# Patient Record
Sex: MALE | Race: WHITE | NOT HISPANIC OR LATINO | Employment: FULL TIME | URBAN - METROPOLITAN AREA
[De-identification: names, ages, dates, MRNs, and addresses within clinical notes are randomized per-mention and may not be internally consistent; named-entity substitution may affect disease eponyms.]

---

## 2020-02-03 ENCOUNTER — TRANSFERRED RECORDS (OUTPATIENT)
Dept: HEALTH INFORMATION MANAGEMENT | Facility: CLINIC | Age: 45
End: 2020-02-03

## 2020-02-03 LAB
ALT SERPL-CCNC: 70 IU/L (ref 12–68)
AST SERPL-CCNC: 36 IU/L (ref 12–37)
CREAT SERPL-MCNC: 0.84 MG/DL (ref 0.7–1.3)
GFR SERPL CREATININE-BSD FRML MDRD: >60 ML/MIN
GLUCOSE SERPL-MCNC: 120 MG/DL (ref 74–106)
HEP C HIM: NORMAL
POTASSIUM SERPL-SCNC: 3.9 MMOL/L (ref 3.5–5.1)

## 2020-02-06 ENCOUNTER — TRANSFERRED RECORDS (OUTPATIENT)
Dept: HEALTH INFORMATION MANAGEMENT | Facility: CLINIC | Age: 45
End: 2020-02-06

## 2020-02-06 LAB
ALT SERPL-CCNC: 135 U/L (ref 0–78)
AST SERPL-CCNC: 84 U/L (ref 0–37)
CREAT SERPL-MCNC: 0.9 MG/DL (ref 0.7–1.3)
GLUCOSE SERPL-MCNC: 82 MG/DL (ref 74–100)
HEP C HIM: NORMAL
POTASSIUM SERPL-SCNC: 4.5 MMOL/L (ref 3.5–5.1)

## 2020-02-11 ENCOUNTER — TRANSFERRED RECORDS (OUTPATIENT)
Dept: HEALTH INFORMATION MANAGEMENT | Facility: CLINIC | Age: 45
End: 2020-02-11

## 2020-02-21 LAB — INR PPP: 0.9 (ref 0.9–1.1)

## 2020-05-15 LAB — TSH SERPL-ACNC: 2.42 UIU/ML (ref 0.45–4.5)

## 2020-08-14 LAB
ALT SERPL-CCNC: 74 U/L (ref 0–78)
AST SERPL-CCNC: 49 U/L (ref 0–37)
CREAT SERPL-MCNC: 0.9 MG/DL (ref 0.7–1.3)
GLUCOSE SERPL-MCNC: 94 MG/DL (ref 74–100)
POTASSIUM SERPL-SCNC: 4.4 MMOL/L (ref 3.5–5.1)

## 2020-09-18 ENCOUNTER — TRANSFERRED RECORDS (OUTPATIENT)
Dept: HEALTH INFORMATION MANAGEMENT | Facility: CLINIC | Age: 45
End: 2020-09-18

## 2020-10-02 ENCOUNTER — TRANSFERRED RECORDS (OUTPATIENT)
Dept: HEALTH INFORMATION MANAGEMENT | Facility: CLINIC | Age: 45
End: 2020-10-02

## 2020-12-02 ENCOUNTER — TRANSFERRED RECORDS (OUTPATIENT)
Dept: HEALTH INFORMATION MANAGEMENT | Facility: CLINIC | Age: 45
End: 2020-12-02

## 2020-12-17 ENCOUNTER — TRANSFERRED RECORDS (OUTPATIENT)
Dept: HEALTH INFORMATION MANAGEMENT | Facility: CLINIC | Age: 45
End: 2020-12-17

## 2021-04-07 ENCOUNTER — VIRTUAL VISIT (OUTPATIENT)
Dept: GASTROENTEROLOGY | Facility: CLINIC | Age: 46
End: 2021-04-07
Payer: COMMERCIAL

## 2021-04-07 DIAGNOSIS — K59.00 CONSTIPATION, UNSPECIFIED CONSTIPATION TYPE: ICD-10-CM

## 2021-04-07 DIAGNOSIS — K50.80 CROHN'S DISEASE OF BOTH SMALL AND LARGE INTESTINE WITHOUT COMPLICATION (H): Primary | ICD-10-CM

## 2021-04-07 PROCEDURE — 99205 OFFICE O/P NEW HI 60 MIN: CPT | Mod: 95 | Performed by: INTERNAL MEDICINE

## 2021-04-07 RX ORDER — ACETAMINOPHEN 500 MG
500 TABLET ORAL
COMMUNITY

## 2021-04-07 RX ORDER — FEXOFENADINE HCL 180 MG/1
180 TABLET ORAL
COMMUNITY

## 2021-04-07 RX ORDER — LANOLIN ALCOHOL/MO/W.PET/CERES
1000 CREAM (GRAM) TOPICAL DAILY
COMMUNITY
Start: 2020-12-22

## 2021-04-07 RX ORDER — ONDANSETRON 4 MG/1
4 TABLET, ORALLY DISINTEGRATING ORAL EVERY 6 HOURS PRN
COMMUNITY
Start: 2019-05-15

## 2021-04-07 RX ORDER — LINACLOTIDE 290 UG/1
CAPSULE, GELATIN COATED ORAL
COMMUNITY
Start: 2021-03-11 | End: 2023-05-24

## 2021-04-07 RX ORDER — BUTALBITAL/ACETAMINOPHEN 50MG-325MG
TABLET ORAL
COMMUNITY
Start: 2021-02-15 | End: 2024-06-11

## 2021-04-07 RX ORDER — ELETRIPTAN HYDROBROMIDE 20 MG/1
20 TABLET, FILM COATED ORAL
COMMUNITY
Start: 2019-05-15 | End: 2024-06-11 | Stop reason: DRUGHIGH

## 2021-04-07 RX ORDER — TADALAFIL 20 MG/1
20 TABLET ORAL
COMMUNITY
Start: 2019-05-15

## 2021-04-07 RX ORDER — POLYETHYLENE GLYCOL 3350 17 G/17G
17 POWDER, FOR SOLUTION ORAL
COMMUNITY
Start: 2020-05-31 | End: 2023-12-27

## 2021-04-07 RX ORDER — ADALIMUMAB 40MG/0.4ML
40 KIT SUBCUTANEOUS
Qty: 4 EACH | Refills: 3 | Status: SHIPPED | OUTPATIENT
Start: 2021-04-07 | End: 2021-08-06

## 2021-04-07 RX ORDER — AZELASTINE HYDROCHLORIDE, FLUTICASONE PROPIONATE 137; 50 UG/1; UG/1
1 SPRAY, METERED NASAL
COMMUNITY
End: 2024-06-11

## 2021-04-07 RX ORDER — HYOSCYAMINE SULFATE 0.38 MG/1
0.38 TABLET, EXTENDED RELEASE ORAL EVERY 12 HOURS PRN
COMMUNITY
Start: 2019-05-15

## 2021-04-07 RX ORDER — ADALIMUMAB 40MG/0.4ML
40 KIT SUBCUTANEOUS
COMMUNITY
Start: 2019-04-25 | End: 2021-04-07

## 2021-04-07 RX ORDER — FLUTICASONE PROPIONATE 50 MCG
1 SPRAY, SUSPENSION (ML) NASAL
COMMUNITY
Start: 2019-05-15

## 2021-04-07 NOTE — PROGRESS NOTES
HPI:    Monty  presents today for a video visit to establish care for Crohn's disease.  Was initially diagnosed in 2018 - was started on humira which was eventually increased to weekly doses with good response.  Did at one point have a stricture in the transverse colon that was unable to be crossed, however, this had resolved on follow-up colonoscopy.  Recent MRE and colonoscopy without active inflammation.     Continues to struggle with constipation.  Has been on trulance and miralax which didn't seem to help.  Is now on linzess 290mcg daily - will have multiple loose stools in the AM followed by more regular stools later in the day.  Also has generalized abdominal pain.  Has tried levsin but didn't like how it made him feel.    Has been gluten free for a few years.  Was raised and remains a vegetarian.  Doesn't eat dairy - does eat some eggs.     Also feels very run down and like he has no energy all the time.  Doesn't think he has anxiety or depression but does admit to feeling frustrated that his symptoms aren't resolving as fast as he would like.    Never smoker.  No alcohol.    Last c-scope with large sigmoid polyp.    History of a nissen - had to have a redo shortly after as it caused dysphagia.     Used to follow with a dermatologist due to history of dysplastic nevi - hasn't established care with one since moving to [place]    PMH:  Crohns      Social Hx: no alcohol or tobacco.  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  25mm sigmoid polyp     MRE 10/2020 - normal aside from fatty  liver and constipation      Assessment and Plan:    # constipation - likely some component of IBS-C.  Now concern for over treatment with multiple loose stools a day - will decrease linzess to 145mcg daily and monitor response - patient encouraged to call with worsening symptoms.  Noted previous stricture in transverse colon on colonoscopy but had resolved on follow-up exam.    Crohn's colitis - doing well on weekly humira - minimal inflammation in cecum at last c-scope but otherwise unremarkable as was an MRE in October.  Will continue weekly humira. Will check blood work and fecal calpro today    Vaccinations:  -- Influenza (every year): Last given will address at next visit  -- TdaP (every 10 years): Last given will address at next visit  -- Pneumococcal Pneumonia (once then every 5 years): Last given will address at next visit  -- Yearly assessment for latent Tb (verbal screening and exam, PPD or QuantiFERON-Tb testing): Last obtained 2018 negative    One time confirmation of immunity or serologies:  -- Hepatitis A (serologies or immunizations): will discuss vaccine at next visit  -- Hepatitis B (serologies or immunizations): negative in 2018 - will discuss vaccine at next visit  -- MMR: will address at next visit  -- HPV (all aged 18-26): n/a  -- Meningococcal meningitis (all patients at risk for meningitis): N/a  -- Due to the immunosuppression in this patient, I would not advise administration of live vaccines such as varicella/VZV, intranasal influenza, MMR, or yellow fever vaccine (if travelling).      Bone mineral density screening   -- Recommend all patients supplement with calcium and vitamin D  -- Given prior steroid use recommend DEXA if not already done    Cancer Screening:  Colon cancer screening:    Skin cancer screening: Annual visual exam of skin by dermatologist since patient is immunocompromised - discussed with patient today given his history - referral placed to dermatology    Depression  Screening:  -- Discussed today - did talk about seeing health psychology - may consider in the future    Misc:  -- Avoid tobacco use  -- Avoid NSAIDs as there is potentially a 25% chance of causing an IBD flare      RTC 2 months    Ruby Beck DO     Video-Visit Details     Type of service:  Video Visit     Video Start Time: 10:33  Video End Time (time video stopped): 11:03    Originating Location (pt. Location): home     Distant Location (provider location):  [facility]      Mode of Communication:  Video Conference via [employer]

## 2021-04-07 NOTE — PATIENT INSTRUCTIONS
Please have blood work done and submit a stool sample whenever is convenient for you.    We will decrease your linzess to 145mcg a day - I have sent a new prescription to your pharmacy.  Try this for 1-2 weeks - let me know if your symptoms are getting worse or not improving and we can try something different.      The crohn's and colitis foundation (ccfa.org) is a great resource for patients - it has a lot of information regarding various treatment options, diet, community resources, etc.    Please see a dermatologist.     Currently, there is limited data to make recommendations regarding the COVID-19 vaccine. Unfortunately, people with conditions that cause low-immunity and people on medications that suppress their immune system were not included in the currently published clinical trials ([place] Journal of Medicine, December 2020). We are therefore using the best information we have to make the safest decision. We understand there are also individual circumstances that may not fall into a single recommendation.     - The COVID-19 vaccine in the [place] is NOT a live virus vaccine  - There is no risk of COVID infection with this vaccine  - There may be unknown risks as this is a new type of vaccine (no other current vaccines are like this)  - There may still be immune related side effects as this vaccine does stimulate the immune system (for example: fevers, sore throat, runny nose, pain at injection site)  - The vaccine may be less effective in people with IBD, especially if on medications that affect the immune system. On the other hand, this vaccine may be better than other vaccines (such as flu) because it can elicit a stronger immune response.     New immune reactions are a potential risk for all people, including people with inflammatory bowel disease. In the short term (2-3 months) this has not been seen in the general population.  It is important to know this is a potential risk, and not yet an observed  risk.     At this point, given the overall risk of COVID and likely safety of the COVID vaccine, patients with inflammatory bowel disease should get vaccinated when available. We also think people on medications that affect the immune system should get vaccinated. If you are concerned, then please (1) talk to your IBD provider and (2) make sure that everyone in your household does get the vaccine. Vaccinating the people around you will also help to protect you.     Finally, even after getting the vaccine it is still essential to wear a mask, wash your hands frequently, and practice social distancing. We will need to maintain these basic safety precautions until all people are vaccinated.     [facility]

## 2021-04-07 NOTE — PROGRESS NOTES
Monty is a 45 year old who is being evaluated via a billable video visit.      How would you like to obtain your AVS? MyChart  If the video visit is dropped, the invitation should be resent by: Text to cell phone: [phone]  Will anyone else be joining your video visit? No      Video Start Time:   Video-Visit Details    Type of service:  Video Visit    Video End Time:    Originating Location (pt. Location):     Distant Location (provider location):  [facility]     Platform used for Video Visit:   Marshal Poole CMA

## 2021-04-22 DIAGNOSIS — K59.00 CONSTIPATION, UNSPECIFIED CONSTIPATION TYPE: ICD-10-CM

## 2021-04-22 DIAGNOSIS — K50.80 CROHN'S DISEASE OF BOTH SMALL AND LARGE INTESTINE WITHOUT COMPLICATION (H): ICD-10-CM

## 2021-04-22 LAB
ALBUMIN SERPL-MCNC: 4.1 G/DL (ref 3.4–5)
ALP SERPL-CCNC: 102 U/L (ref 40–150)
ALT SERPL W P-5'-P-CCNC: 76 U/L (ref 0–70)
ANION GAP SERPL CALCULATED.3IONS-SCNC: 2 MMOL/L (ref 3–14)
AST SERPL W P-5'-P-CCNC: 40 U/L (ref 0–45)
BILIRUB SERPL-MCNC: 0.6 MG/DL (ref 0.2–1.3)
BUN SERPL-MCNC: 8 MG/DL (ref 7–30)
CALCIUM SERPL-MCNC: 8.9 MG/DL (ref 8.5–10.1)
CHLORIDE SERPL-SCNC: 108 MMOL/L (ref 94–109)
CO2 SERPL-SCNC: 30 MMOL/L (ref 20–32)
CREAT SERPL-MCNC: 0.79 MG/DL (ref 0.66–1.25)
CRP SERPL-MCNC: <2.9 MG/L (ref 0–8)
DEPRECATED CALCIDIOL+CALCIFEROL SERPL-MC: 41 UG/L (ref 20–75)
ERYTHROCYTE [DISTWIDTH] IN BLOOD BY AUTOMATED COUNT: 13.1 % (ref 10–15)
GFR SERPL CREATININE-BSD FRML MDRD: >90 ML/MIN/{1.73_M2}
GLUCOSE SERPL-MCNC: 75 MG/DL (ref 70–99)
HCT VFR BLD AUTO: 47.3 % (ref 40–53)
HGB BLD-MCNC: 15.8 G/DL (ref 13.3–17.7)
MCH RBC QN AUTO: 31.2 PG (ref 26.5–33)
MCHC RBC AUTO-ENTMCNC: 33.4 G/DL (ref 31.5–36.5)
MCV RBC AUTO: 93 FL (ref 78–100)
PLATELET # BLD AUTO: 265 10E9/L (ref 150–450)
POTASSIUM SERPL-SCNC: 4.1 MMOL/L (ref 3.4–5.3)
PROT SERPL-MCNC: 7.6 G/DL (ref 6.8–8.8)
RBC # BLD AUTO: 5.07 10E12/L (ref 4.4–5.9)
SODIUM SERPL-SCNC: 140 MMOL/L (ref 133–144)
TSH SERPL DL<=0.005 MIU/L-ACNC: 1.91 MU/L (ref 0.4–4)
VIT B12 SERPL-MCNC: 593 PG/ML (ref 193–986)
WBC # BLD AUTO: 7.3 10E9/L (ref 4–11)

## 2021-04-22 PROCEDURE — 36415 COLL VENOUS BLD VENIPUNCTURE: CPT | Performed by: INTERNAL MEDICINE

## 2021-04-22 PROCEDURE — 85027 COMPLETE CBC AUTOMATED: CPT | Performed by: INTERNAL MEDICINE

## 2021-04-22 PROCEDURE — 82607 VITAMIN B-12: CPT | Performed by: INTERNAL MEDICINE

## 2021-04-22 PROCEDURE — 86140 C-REACTIVE PROTEIN: CPT | Performed by: INTERNAL MEDICINE

## 2021-04-22 PROCEDURE — 82306 VITAMIN D 25 HYDROXY: CPT | Performed by: INTERNAL MEDICINE

## 2021-04-22 PROCEDURE — 80053 COMPREHEN METABOLIC PANEL: CPT | Performed by: INTERNAL MEDICINE

## 2021-04-22 PROCEDURE — 84443 ASSAY THYROID STIM HORMONE: CPT | Performed by: INTERNAL MEDICINE

## 2021-05-01 ENCOUNTER — HEALTH MAINTENANCE LETTER (OUTPATIENT)
Age: 46
End: 2021-05-01

## 2021-05-12 ENCOUNTER — OFFICE VISIT (OUTPATIENT)
Dept: DERMATOLOGY | Facility: CLINIC | Age: 46
End: 2021-05-12
Attending: INTERNAL MEDICINE
Payer: COMMERCIAL

## 2021-05-12 DIAGNOSIS — L81.4 SOLAR LENTIGO: ICD-10-CM

## 2021-05-12 DIAGNOSIS — K50.80 CROHN'S DISEASE OF BOTH SMALL AND LARGE INTESTINE WITHOUT COMPLICATION (H): ICD-10-CM

## 2021-05-12 DIAGNOSIS — Z87.898 HISTORY OF ATYPICAL NEVUS: ICD-10-CM

## 2021-05-12 DIAGNOSIS — D18.01 CHERRY ANGIOMA: ICD-10-CM

## 2021-05-12 DIAGNOSIS — D84.9 IMMUNOSUPPRESSED STATUS (H): Primary | ICD-10-CM

## 2021-05-12 DIAGNOSIS — L82.1 SEBORRHEIC KERATOSIS: ICD-10-CM

## 2021-05-12 DIAGNOSIS — L70.0 COMEDONE: ICD-10-CM

## 2021-05-12 DIAGNOSIS — D22.9 MULTIPLE BENIGN NEVI: ICD-10-CM

## 2021-05-12 PROCEDURE — 99203 OFFICE O/P NEW LOW 30 MIN: CPT | Performed by: PHYSICIAN ASSISTANT

## 2021-05-12 NOTE — NURSING NOTE
Monty Fleming's goals for this visit include:   Chief Complaint   Patient presents with     Skin Check     FBSC/ spot on L cheek area of concern/ no hx of skin cancers- hx of DN/ has seen Derm in [place] and [place]       He requests these members of his care team be copied on today's visit information:     PCP: No Ref-Primary, Physician    Referring Provider:  Ruby Beck DO  [address] [place] [address]    There were no vitals taken for this visit.    Do you need any medication refills at today's visit? No  Alis López, CMA on 5/12/2021 at 10:13 AM

## 2021-05-12 NOTE — PATIENT INSTRUCTIONS
The ABCDEs of Melanoma    Skin cancer can develop anywhere on the skin. Ask someone for help when checking your skin, especially in hard to see places. If you notice a mole different from others, or that changes, enlarges, itches, or bleeds (even if it is small), you should see a dermatologist.              Sun protective clothing and Resources     SureDone (www.[employer])  Athleta (www.Hastify)  eFolder (www.SpectraScience)  Carve Designs (Viddsee) - affordable  Skinz (Trendlines Groupskinz.com)    Long sleeve - Chauncey Cool DRI UPF 50 or [place] PFG UPF 50  Hoodie - [place] PFG UPF 50  Swimshirt/Rash Guard - [place] UPF 50 (on Amazon)  Neck - Outdoor Research Ubertubes (www.outdoorresAsl Analytical.com)

## 2021-05-12 NOTE — PROGRESS NOTES
[facility] Dermatology Note  Encounter Date: May 12, 2021  Office Visit     Dermatology Problem List:  1. Chron's Disease - Humira since 2018  2. History of DN x2 - patient reported, records requested  - left calf, ~2017  - second DN ~ 2010    Family history: Negative for skin cancer.  Social history: Lived in [place] and [place]. Moved to [place] for job.  ____________________________________________    Assessment & Plan:    # Immunosuppressed with Humira for Chron's Disease. Discussed increased risk of skin cancers with immunosuppressing medications.   - Recommend sunscreens SPF #30 or greater, protective clothing and avoidance of tanning beds.   - Recommended yearly skin exams.    # Cherry angioma(s).    - No further intervention needed.    # Multiple clinically benign nevi on the trunk.    - No further intervention needed.   - ABCD's of melanoma were reviewed with patient and handout provided.   - Sunscreen: Apply 20 minutes prior to going outdoors and reapply every two hours, when wet or sweating. We recommend using an SPF 30 or higher, and to use one that is water resistant.       # Seborrheic keratosis, non irritated.   - No further intervention needed.     # Solar lentigines.   - No further intervention needed.     # Comedone x1 - R clavicle  - No further intervention needed.    Procedures Performed:   None.    Follow-up: 1 year(s) in-person, or earlier for new or changing lesions    Staff and Scribe:     Scribe Disclosure:   I, Brianna Leung, am serving as a scribe to document services personally performed by Lorena Arias PA-C, based on data collection and the provider's statements to me.    Provider Disclosure:   The documentation recorded by the scribe accurately reflects the services I personally performed and the decisions made by me.    All risks, benefits and alternatives were discussed with patient.  Patient is in agreement and understands the assessment and plan.  All questions  were answered.    Lorena Arias PA-C, MPAS  [facility] [place]: Phone: [phone], Fax: [phone]  [facility]: Phone: [phone],  Fax: [phone]  ____________________________________________    CC: Skin Check (FBSC/ spot on L cheek area of concern/ no hx of skin cancers- hx of DN/ has seen Derm in [place] and [place])    HPI:  Mr. Monty Fleming is a(n) 45 year old male who presents today as a new patient for skin check.    Self referred. He has seen dermatologists in [place] and [place] in the past.    Today, patient notes a history of DN. Patient notes concern on the left cheek area. A dark spot has been more prevalent recently. Was diagnosed with Chron's disease at the end of 2018. Has been on Humira for one year.    Patient is otherwise feeling well, without additional concerns.    Labs:  NA    Physical Exam:  Vitals: There were no vitals taken for this visit.  SKIN: Total skin excluding the undergarment areas was performed. The exam included the head/face, neck, both arms, chest, back, abdomen, buttocks, both legs, digits and/or nails.   - Quezada Type II  - There are dome shaped bright red papules on the trunk.   - Multiple regular brown pigmented macules and papules are identified on the trunk.   - Scattered brown macules on the shoulders.  - There are waxy stuck on tan to brown papules on the right cheek (area of patient's concern).  - There is a small, 2mm open comedone on the right clavicle.  - No other lesions of concern on areas examined.     Medications:  Current Outpatient Medications   Medication     acetaminophen (TYLENOL) 500 MG tablet     adalimumab (HUMIRA *CF* PEN) 40 MG/0.4ML pen kit     azelastine-fluticasone (DYMISTA) 137-50 MCG/ACT nasal spray     Butalbital-Acetaminophen (PHRENILIN)  MG TABS per tablet     cholecalciferol 25 MCG (1000 UT) TABS     cyanocobalamin (VITAMIN B-12) 1000 MCG tablet      eletriptan (RELPAX) 20 MG tablet     fexofenadine (ALLEGRA) 180 MG tablet     fluticasone (FLONASE) 50 MCG/ACT nasal spray     hyoscyamine ER (LEVBID) 375 mcg 12 hr tablet     linaclotide (LINZESS) 145 MCG capsule     LINZESS 290 MCG capsule     ondansetron (ZOFRAN-ODT) 4 MG ODT tab     Peppermint Oil (IBGARD PO)     polyethylene glycol (MIRALAX) 17 GM/Dose powder     tadalafil (CIALIS) 20 MG tablet     No current facility-administered medications for this visit.       Past Medical History:   There is no problem list on file for this patient.    No past medical history on file.     CC [place]  [address] [place] [address] on close of this encounter.    CC Dr. Fu on close of this encounter.

## 2021-07-28 ENCOUNTER — VIRTUAL VISIT (OUTPATIENT)
Dept: GASTROENTEROLOGY | Facility: CLINIC | Age: 46
End: 2021-07-28
Payer: COMMERCIAL

## 2021-07-28 DIAGNOSIS — K59.00 CONSTIPATION, UNSPECIFIED CONSTIPATION TYPE: ICD-10-CM

## 2021-07-28 DIAGNOSIS — K50.80 CROHN'S DISEASE OF BOTH SMALL AND LARGE INTESTINE WITHOUT COMPLICATION (H): Primary | ICD-10-CM

## 2021-07-28 PROCEDURE — 99214 OFFICE O/P EST MOD 30 MIN: CPT | Mod: 95 | Performed by: INTERNAL MEDICINE

## 2021-07-28 NOTE — PROGRESS NOTES
Monty is a 45 year old who is being evaluated via a billable video visit.      How would you like to obtain your AVS? MyChart  If the video visit is dropped, the invitation should be resent by: Text to cell phone: [phone]  Will anyone else be joining your video visit? No    Prachi Sterling LPN on 7/28/21 at 1:50 PM    Video-Visit Details

## 2021-07-28 NOTE — PATIENT INSTRUCTIONS
I will start you on a lower dose of linzess - if this doesn't help or is still giving you diarrhea after about 2 weeks please let me know.    Please have blood work done.    Please schedule an MR enterography    You can try things like hotpacks, cold packs and topical creams like bengay, biofreeze, etc on your left side to see if it helps with pain.    You can try slowly adding gluten back to your diet and see how you feel.    I will have Wang Vo one of our health psychologists reach out to you.

## 2021-07-28 NOTE — PROGRESS NOTES
HPI:    Monty  presents today for a video visit for follow-up of crohns and constipation.  Overall doing okay.  Was started on the lower dose of linzess (145mcg) at last appointment - continues to struggle with intermittent diarrhea with this - skips doses on days he works outside of the house - this is usually only once or twice a week now but he expects this will be more frequent in the coming months as school starts up again and he is travelling more for meetings.    Continues to have occasional left sided abdominal pain.  Worse when seated and with laying down.  Sometimes worse with eating.  Not usually associated with bowel movements.    Continues to feel as though he has no energy.  Has motivation to do things but getting the energy to complete them is difficult.  Does use a c-pap - hasn't seen the sleep clinic in awhile though.  Falls asleep easily at night and stays asleep - doesn't feel rested in the AM.    Is vegetarian and generally avoids dairy as well.  Is gluten free which he switched to when he initially developed GI symptoms but before he was diagnosed with Crohns.      O:    Gen: no acute distress  HEENT: NCAT  Neck: normal ROM  Resp: nonlabored breathing  Neuro: no gross deficits  Psych: appropriate mood and affect    Assessment and Plan:   # crohns colitis/ileitis - doing well clinically on weekly humira - due for repeat labs - will order today.  Will also plan for MRE for disease surveillance and to further work-up left sided abdominal pain    # constipation - still with diarrhea with lower dose of linzess - will decrease to 72mcg daily and monitor response.    RTC 3 months    Vaccinations:  -- Influenza (every year): encourage to receive when available  -- TdaP (every 10 years): received last year  -- Pneumococcal Pneumonia (once then every 5 years): Last given will address at next visit - received prevnar in 2019 - will discuss pneumovax  -- Yearly assessment for latent Tb (verbal  screening and exam, PPD or QuantiFERON-Tb testing): Last obtained 2018 negative  - Covid vaccine - received 2021     One time confirmation of immunity or serologies:  -- Hepatitis A (serologies or immunizations): will discuss vaccine at next visit  -- Hepatitis B (serologies or immunizations): negative in 2018 - will discuss vaccine at next visit  -- MMR: will address at next visit  -- HPV (all aged 18-26): n/a  -- Meningococcal meningitis (all patients at risk for meningitis): N/a  -- Due to the immunosuppression in this patient, I would not advise administration of live vaccines such as varicella/VZV, intranasal influenza, MMR, or yellow fever vaccine (if travelling).       Bone mineral density screening   -- Recommend all patients supplement with calcium and vitamin D  -- Given prior steroid use recommend DEXA if not already done     Cancer Screening:  Colon cancer screening:     Skin cancer screening: Annual visual exam of skin by dermatologist since patient is immunocompromised - discussed with patient today given his history - referral placed to dermatology     Depression Screening:  -- Discussed again today - patient would like to speak with health psychologist - referral placed today     Misc:  -- Avoid tobacco use  -- Avoid NSAIDs as there is potentially a 25% chance of causing an IBD flare       Ruby Beck, DO     Video-Visit Details     Type of service:  Video Visit     Video Start Time: 2:03 PM  Video End Time (time video stopped): 2:24 PM    Originating Location (pt. Location): home     Distant Location (provider location):  [facility]      Mode of Communication:  Video Conference via [employer]

## 2021-07-29 ENCOUNTER — TELEPHONE (OUTPATIENT)
Dept: PSYCHOLOGY | Facility: CLINIC | Age: 46
End: 2021-07-29

## 2021-07-29 NOTE — TELEPHONE ENCOUNTER
Attempted to contact patient at the request of Dr. Beck to introduce [facility] services to the patient. Patient may schedule with the [facility] if he calls and requests an appointment. Will make a few more attempts to contact patient.    Update: Spoke with patient and scheduled new patient appointment for August 11.

## 2021-08-05 DIAGNOSIS — K50.80 CROHN'S DISEASE OF BOTH SMALL AND LARGE INTESTINE WITHOUT COMPLICATION (H): ICD-10-CM

## 2021-08-05 NOTE — TELEPHONE ENCOUNTER
[facility] Call Center    Phone Message    May a detailed message be left on voicemail: no     Reason for Call: Medication Refill Request    Has the patient contacted the pharmacy for the refill? Yes   Name of medication being requested: adalimumab (HUMIRA *CF* PEN) 40 MG/0.4ML pen kit  Provider who prescribed the medication: Ebony  Pharmacy:    [address]  Date medication is needed: ASAP - the patient is out of medication, and needs [facility] per [facility]. Please advise. Thank you.     Action Taken: Message routed to:  Adult Clinics: Gastroenterology (GI) p [id number]    Travel Screening: Not Applicable

## 2021-08-05 NOTE — TELEPHONE ENCOUNTER
adalimumab (HUMIRA *CF* PEN) 40 MG/0.4ML pen kit  Last Written Prescription Date:  4/7/2021  Last Fill Quantity: 4,  # refills: 3   Last office visit: Virtual on 7/28/2021 with prescribing provider:    Future Office Visit:      Routing refill request to provider for review/approval because:  Drug not on the [facility] refill protocol     Shraddha Gomez RN

## 2021-08-06 RX ORDER — ADALIMUMAB 40MG/0.4ML
40 KIT SUBCUTANEOUS
Qty: 4 EACH | Refills: 3 | Status: SHIPPED | OUTPATIENT
Start: 2021-08-06 | End: 2021-10-29

## 2021-08-10 ASSESSMENT — ANXIETY QUESTIONNAIRES
GAD7 TOTAL SCORE: 1
7. FEELING AFRAID AS IF SOMETHING AWFUL MIGHT HAPPEN: NOT AT ALL
4. TROUBLE RELAXING: NOT AT ALL
GAD7 TOTAL SCORE: 1
3. WORRYING TOO MUCH ABOUT DIFFERENT THINGS: NOT AT ALL
5. BEING SO RESTLESS THAT IT IS HARD TO SIT STILL: NOT AT ALL
1. FEELING NERVOUS, ANXIOUS, OR ON EDGE: NOT AT ALL
GAD7 TOTAL SCORE: 1
7. FEELING AFRAID AS IF SOMETHING AWFUL MIGHT HAPPEN: NOT AT ALL
6. BECOMING EASILY ANNOYED OR IRRITABLE: SEVERAL DAYS
8. IF YOU CHECKED OFF ANY PROBLEMS, HOW DIFFICULT HAVE THESE MADE IT FOR YOU TO DO YOUR WORK, TAKE CARE OF THINGS AT HOME, OR GET ALONG WITH OTHER PEOPLE?: NOT DIFFICULT AT ALL
2. NOT BEING ABLE TO STOP OR CONTROL WORRYING: NOT AT ALL

## 2021-08-10 ASSESSMENT — PATIENT HEALTH QUESTIONNAIRE - PHQ9
SUM OF ALL RESPONSES TO PHQ QUESTIONS 1-9: 6
10. IF YOU CHECKED OFF ANY PROBLEMS, HOW DIFFICULT HAVE THESE PROBLEMS MADE IT FOR YOU TO DO YOUR WORK, TAKE CARE OF THINGS AT HOME, OR GET ALONG WITH OTHER PEOPLE: SOMEWHAT DIFFICULT
SUM OF ALL RESPONSES TO PHQ QUESTIONS 1-9: 6

## 2021-08-11 ENCOUNTER — VIRTUAL VISIT (OUTPATIENT)
Dept: PSYCHOLOGY | Facility: CLINIC | Age: 46
End: 2021-08-11
Payer: COMMERCIAL

## 2021-08-11 DIAGNOSIS — F43.20 ADJUSTMENT DISORDER, UNSPECIFIED TYPE: Primary | ICD-10-CM

## 2021-08-11 PROCEDURE — 90791 PSYCH DIAGNOSTIC EVALUATION: CPT | Mod: 95 | Performed by: PSYCHOLOGIST

## 2021-08-11 ASSESSMENT — COLUMBIA-SUICIDE SEVERITY RATING SCALE - C-SSRS
3. HAVE YOU BEEN THINKING ABOUT HOW YOU MIGHT KILL YOURSELF?: NO
5. HAVE YOU STARTED TO WORK OUT OR WORKED OUT THE DETAILS OF HOW TO KILL YOURSELF? DO YOU INTEND TO CARRY OUT THIS PLAN?: NO
1. IN THE PAST MONTH, HAVE YOU WISHED YOU WERE DEAD OR WISHED YOU COULD GO TO SLEEP AND NOT WAKE UP?: NO
5. HAVE YOU STARTED TO WORK OUT OR WORKED OUT THE DETAILS OF HOW TO KILL YOURSELF? DO YOU INTEND TO CARRY OUT THIS PLAN?: NO
2. HAVE YOU ACTUALLY HAD ANY THOUGHTS OF KILLING YOURSELF LIFETIME?: NO
2. HAVE YOU ACTUALLY HAD ANY THOUGHTS OF KILLING YOURSELF?: NO
4. HAVE YOU HAD THESE THOUGHTS AND HAD SOME INTENTION OF ACTING ON THEM?: NO
4. HAVE YOU HAD THESE THOUGHTS AND HAD SOME INTENTION OF ACTING ON THEM?: NO
1. IN THE PAST MONTH, HAVE YOU WISHED YOU WERE DEAD OR WISHED YOU COULD GO TO SLEEP AND NOT WAKE UP?: NO

## 2021-08-11 ASSESSMENT — PATIENT HEALTH QUESTIONNAIRE - PHQ9: SUM OF ALL RESPONSES TO PHQ QUESTIONS 1-9: 6

## 2021-08-11 ASSESSMENT — ANXIETY QUESTIONNAIRES: GAD7 TOTAL SCORE: 1

## 2021-08-11 NOTE — PROGRESS NOTES
"[facility]: Integrated Behavioral Health  Provider Name:  Wang Vo     Credentials:  ERUM Cohen    PATIENT'S NAME: Monty Fleming  PREFERRED NAME: Monty  PRONOUNS: he/him/his     MRN: [id number]  : 1975  ADDRESS: [address] [place] [id number]  ACCT. NUMBER:  [id number]  DATE OF SERVICE: 21  START TIME: 02:30pm  END TIME: 03:00pm  PREFERRED PHONE: [phone]  May we leave a program related message: Yes  SERVICE MODALITY:  Video Visit:      Provider verified identity through the following two step process.  Patient provided:  Patient photo    Telemedicine Visit: The patient's condition can be safely assessed and treated via synchronous audio and visual telemedicine encounter.      Reason for Telemedicine Visit: Services only offered telehealth    Originating Site (Patient Location): Patient's home    Distant Site (Provider Location): Provider Remote Setting- Home Office    Consent:  The patient/guardian has verbally consented to: the potential risks and benefits of telemedicine (video visit) versus in person care; bill my insurance or make self-payment for services provided; and responsibility for payment of non-covered services.     Patient would like the video invitation sent by:  My Chart    Mode of Communication:  Video Conference via well    As the provider I attest to compliance with applicable laws and regulations related to telemedicine.    UNIVERSAL ADULT Mental Health DIAGNOSTIC ASSESSMENT    Identifying Information:  Patient is a 45 year old,  .  The pronoun use throughout this assessment reflects the patient's chosen pronoun.  Patient was referred for an assessment by  referring provider.  Patient attended the session alone.     Chief Complaint:   The reason for seeking services at this time is: \"Living with Crohn's Disease\". Living with Crohn's disease. Dx 2.5 years ago. Number of years prior to that with struggles. Not the " "diagnosis would hope for. Inflammation under control but fatigue is rough and ongoing frustration. The problem(s) began 11/12/18.    Patient has not attempted to resolve these concerns in the past.    Social/Family History:  Patient reported they grew up in other [place], [place].  They were raised by biological parents  .  Parents parents were always together.  Patient reported that their childhood was \"a fine childhood.\"  Patient described their current relationships with family of origin as \"good\" noting that they still live in [place]. Patient and his family moved to [place] 2 years ago.     The patient describes their cultural background as .  Cultural influences and impact on patient's life structure, values, norms, and healthcare: I grew up as a Seventh-day Spiritism and work for the [employer] currently..  Contextual influences on patient's health include: Health- Seeking Factors Coping with Crohn's disease.    These factors will be addressed in the Preliminary Treatment plan. Patient identified their preferred language to be English. Patient reported they does not need the assistance of an  or other support involved in therapy.     Patient reported had no significant delays in developmental tasks.   Patient's highest education level was graduate school  .  Patient identified the following learning problems: none reported.  Modifications will not be used to assist communication in therapy. Patient reports they are  able to understand written materials.    Patient reported the following relationship history 1 marriage.  Patient's current relationship status is  for 26 years.   Patient identified their sexual orientation as heterosexual.  Patient reported having 3 child(jose). Patient identified parents;spouse as part of their support system.  Patient identified the quality of these relationships as good,  .      Patient's current living/housing situation involves staying in own " home/apartment.  The immediate members of family and household include Emmy Fleming, 47,Wife  and they report that housing is stable.    Patient is currently employed fulltime.  Patient reports their finances are obtained through employment;spouse. Patient does identify finances as a current stressor.      Patient reported that they have not been involved with the legal system.   . Patient does not report being under probation/ parole/ jurisdiction. They are not under any current court jurisdiction. .    Patient's Strengths and Limitations:  Patient identified the following strengths or resources that will help them succeed in treatment: Alevism / Nondenominational, commitment to health and well being, gini / spirituality, friends / good social support, family support, insight, intelligence, positive work environment, strong social skills and work ethic. Things that may interfere with the patient's success in treatment include: none identified.     Personal and Family Medical History:  Patient does not report a family history of mental health concerns.  Patient reports family history is not on file..     Patient does report Mental Health Diagnosis and/or Treatment.  Patient Patient reported the following previous diagnoses which include(s):   .  Patient reported symptoms began 3 years ago.  Patient has not received mental health services in the past:     .  Psychiatric Hospitalizations:   when   ,  ,  ,  ,  ,  ,  ,  ,  ,  ,  .  Patient denies a history of civil commitment.  Currently, patient    receiving other mental health services.  These include none.         Patient has had a physical exam to rule out medical causes for current symptoms.  Date of last physical exam was within the past year. Client was encouraged to follow up with PCP if symptoms were to develop. The patient has a [facility] Primary Care Provider, who is named No Ref-Primary, Physician..  Patient reports the following current medical concerns: Crohn's  disease.  Patient denies any issues with pain..   There are not significant appetite / nutritional concerns / weight changes.   Patient does not report a history of head injury / trauma / cognitive impairment.    Patient reports current meds as:   No outpatient medications have been marked as taking for the 8/11/21 encounter (Virtual Visit) with Darryn Vo PsyD.       Medication Adherence:  Patient reports taking.  taking prescribed medications as prescribed.    Patient Allergies:  No Known Allergies    Medical History:  No past medical history on file.      Current Mental Status Exam:   Appearance:  Appropriate    Eye Contact:  Good   Psychomotor:  Normal       Gait / station:  no problem  Attitude / Demeanor: Cooperative   Speech      Rate / Production: Normal/ Responsive      Volume:  Normal  volume      Language:  intact  Mood:   Euthymic  Affect:   Appropriate    Thought Content: Clear   Thought Process: Goal Directed  Logical       Associations: No loosening of associations  Insight:   Good   Judgment:  Intact   Orientation:  All  Attention/concentration: Good    Rating Scales:    PHQ9:    PHQ-9 SCORE 8/10/2021   PHQ-9 Total Score MyChart 6 (Mild depression)   PHQ-9 Total Score 6       GAD7:    BOO-7 SCORE 8/10/2021   Total Score 1 (minimal anxiety)   Total Score 1     CGI:     First:Considering your total clinical experience with this particular patient population, how severe are the patient's symptoms at this time?: 3 (8/11/2021  3:59 PM)      Most recentNo data recorded    Substance Use:  Patient did not report a family history of substance use concerns; see medical history section for details.  Patient has not received chemical dependency treatment in the past.  Patient has not ever been to detox.      Patient is not currently receiving any chemical dependency treatment.           Substance History of use Age of first use Date of last use     Pattern and duration of use (include amounts and  frequency)   Alcohol never used       REPORTS SUBSTANCE USE: N/A   Cannabis   never used     REPORTS SUBSTANCE USE: N/A     Amphetamines   never used     REPORTS SUBSTANCE USE: N/A   Cocaine/crack    never used       REPORTS SUBSTANCE USE: N/A   Hallucinogens never used         REPORTS SUBSTANCE USE: N/A   Inhalants never used         REPORTS SUBSTANCE USE: N/A   Heroin never used         REPORTS SUBSTANCE USE: N/A   Other Opiates used in the past 24 10/10/15 REPORTS SUBSTANCE USE: N/A   Benzodiazepine   never used     REPORTS SUBSTANCE USE: N/A   Barbiturates never used     REPORTS SUBSTANCE USE: N/A   Over the counter meds currently use 10 12/12/20 REPORTS SUBSTANCE USE: N/A   Caffeine used in the past 9   REPORTS SUBSTANCE USE: N/A   Nicotine  never used     REPORTS SUBSTANCE USE: N/A   Other substances not listed above:  Identify:  never used     REPORTS SUBSTANCE USE: N/A     Patient reported the following problems as a result of their substance use: no problems, not applicable.     CAGE- AID:    CAGE-AID Total Score 8/10/2021   Total Score 0   Total Score MyChart 0 (A total score of 2 or greater is considered clinically significant)       Substance Use: No symptoms    Based on the negative CAGE score and clinical interview there  are not indications of drug or alcohol abuse.      Significant Losses / Trauma / Abuse / Neglect Issues:   Patient did not  serve in the .  There are indications or report of significant loss, trauma, abuse or neglect issues related to: are no indications and client denies any losses, trauma, abuse, or neglect concerns.  Concerns for possible neglect are not present.     Safety Assessment:   Current Safety Concerns:  [place] Suicide Severity Rating Scale (Lifetime/Recent)  [place] Suicide Severity Rating (Lifetime/Recent) 8/11/2021   1. Wish to be Dead (Lifetime) No   1. Wish to be Dead (Recent) No   2. Non-Specific Active Suicidal Thoughts (Lifetime) No   2. Non-Specific  Active Suicidal Thoughts (Recent) No   3. Active Suicidal Ideation with any Methods (Not Plan) Without Intent to Act (Lifetime) No   3. Active Suicidal Ideation with any Methods (Not Plan) Without Intent to Act (Recent) No   4. Active Suicidal Ideation with Some Intent to Act, Without Specific Plan (Lifetime) No   4. Active Suicidal Ideation with Some Intent to Act, Without Specific Plan (Recent) No   5. Active Suicidal Ideation with Specific Plan and Intent (Lifetime) No   5. Active Suicidal Ideation with Specific Plan and Intent (Recent) No     Patient denies current homicidal ideation and behaviors.  Patient denies current self-injurious ideation and behaviors.    Patient denied risk behaviors associated with substance use.  Patient denies any high risk behaviors associated with mental health symptoms.  Patient reports the following current concerns for their personal safety: None.  Patient reports there are firearms in the house.     no, they are not secured. The firearms are not secured in a locked space. Client was advised to secure all firearms.    History of Safety Concerns:  Patient denied a history of homicidal ideation.     Patient denied a history of personal safety concerns.    Patient denied a history of assaultive behaviors.    Patient denied a history of sexual assault behaviors.     Patient denied a history of risk behaviors associated with substance use.  Patient denies any history of high risk behaviors associated with mental health symptoms.  Patient reports the following protective factors: forward or future oriented thinking;dedication to family or friends;safe and stable environment;effectively controls impulses;purpose;secure attachment;abstinence from substances;living with other people;effective problem solving skills;commitment to well being;sense of meaning;positive social skills;strong sense of self worth or esteem;sense of personal control or determination    Risk Plan:  See  "Recommendations for Safety and Risk Management Plan    Review of Symptoms per patient report:  Depression: Change in energy level, Difficulties concentrating and Irritability  Manjula:  No Symptoms  Psychosis: No Symptoms  Anxiety: Poor concentration and Irritability  Panic:  No symptoms  Post Traumatic Stress Disorder:  No Symptoms   Eating Disorder: No Symptoms  ADD / ADHD:  No symptoms  Conduct Disorder: No symptoms  Autism Spectrum Disorder: No symptoms  Obsessive Compulsive Disorder: No Symptoms    Patient reports the following compulsive behaviors and treatment history:  .      Sleep: \"not bad'; uses a CPAP  Nicotine: none  Alcohol: none  Drug use: none  Caffeine: very little    Diagnostic Criteria:   A. The development of emotional or behavioral symptoms in response to an identifiable stressor(s) occurring within 3 months of the onset of the stressor(s)  B. These symptoms or behaviors are clinically significant, as evidenced by one or both of the following:  C. The stress-related disturbance does not meet criteria for another disorder & is not not an exacerbation of another mental disorder  D. The symptoms do not represent normal bereavement  E. Once the stressor or its consequences have terminated, the symptoms do not persist for more than an additional 6 months       * Adjustment Disorder with Mixed Disturbance of Emotions and Conduct: The predominant manfestations are both emotional symptoms (e.g. depression, anxiety) and a disturbance of conduct    Functional Status:  Patient reports the following functional impairments: home life with  , relationship(s) and work / vocational responsibilities.     WHODAS:   WHODAS 2.0 Total Score 8/10/2021   Total Score 22   Total Score MyChart 22     Nonprogrammatic care:  Patient is requesting basic services to address current mental health concerns.    Clinical Summary:  1. Reason for assessment: coping with Crohn's disease.  2. Psychosocial, Cultural and Contextual " Factors: [place]  .  3. Principal DSM5 Diagnoses  (Sustained by DSM5 Criteria Listed Above):   Adjustment Disorders  309.4 (F43.25) With mixed disturbance of emotions and conduct.  4. Other Diagnoses that is relevant to services:   .  5. Provisional Diagnosis:   as evidenced by  .  6. Prognosis: Return to Normal Functioning, Expect Improvement and Relieve Acute Symptoms.  7. Likely consequences of symptoms if not treated: deterioration of functioning.  8. Client strengths include:  caring, creative, educated, empathetic, employed, goal-focused, good listener, insightful, intelligent, motivated, open to learning, open to suggestions / feedback, responsible parent, support of family, friends and providers, supportive, wants to learn, willing to ask questions, willing to relate to others and work history .     Recommendations:     1. Plan for Safety and Risk Management:   Recommended that patient call 911 or go to the local ED should there be a change in any of these risk factors..          Report to child / adult protection services was NA.     2. Patient's identified gini / Muslim / spiritual influences will be incorporated into care by encouraging patient to continue utilizing spiritual practices consistent with his gini tradition.     3. Initial Treatment will focus on:    Adjustment Difficulties related to: health challenges.     4. Resources/Service Plan:    services are not indicated.   Modifications to assist communication are not indicated.   Additional disability accommodations are not indicated.      5. Collaboration:   Collaboration / coordination of treatment will be initiated with the following  support professionals: GI team.      6.  Referrals:   The following referral(s) will be initiated: short term  with the [facility]. Next Scheduled Appointment: 08/25/2021.     A Release of Information has been obtained for the following:  .    7. WILD:    WILD:  Discussed the  general effects of drugs and alcohol on health and well-being. Provider gave patient printed information about the effects of chemical use on their health and well being. Recommendations:  n/a .     8. Records:   These were reviewed at time of assessment.   Information in this assessment was obtained from the medical record and  provided by patient who is a good historian.    Patient will have open access to their mental health medical record.      Provider Name/ Credentials:  Wang Vo PsyD, ERUM  August 11, 2021

## 2021-08-20 DIAGNOSIS — K59.00 CONSTIPATION, UNSPECIFIED CONSTIPATION TYPE: ICD-10-CM

## 2021-08-24 ENCOUNTER — MYC MEDICAL ADVICE (OUTPATIENT)
Dept: GASTROENTEROLOGY | Facility: CLINIC | Age: 46
End: 2021-08-24

## 2021-08-24 ENCOUNTER — VIRTUAL VISIT (OUTPATIENT)
Dept: PSYCHOLOGY | Facility: CLINIC | Age: 46
End: 2021-08-24
Payer: COMMERCIAL

## 2021-08-24 DIAGNOSIS — F43.20 ADJUSTMENT DISORDER, UNSPECIFIED TYPE: Primary | ICD-10-CM

## 2021-08-24 PROCEDURE — 90834 PSYTX W PT 45 MINUTES: CPT | Mod: 95 | Performed by: PSYCHOLOGIST

## 2021-08-24 NOTE — PROGRESS NOTES
[facility]: Integrated Behavioral Health  August 24, 2021      Behavioral Health Clinician Progress Note    Patient Name: Monty Fleming      Telemedicine Visit: The patient's condition can be safely assessed and treated via synchronous audio and visual telemedicine encounter.      Reason for Telemedicine Visit: Services only offered telehealth    Originating Site (Patient Location): Patient's home    Distant Site (Provider Location): Provider Remote Setting- Home Office    Consent:  The patient/guardian has verbally consented to: the potential risks and benefits of telemedicine (video visit) versus in person care; bill my insurance or make self-payment for services provided; and responsibility for payment of non-covered services.     Mode of Communication:  Video Conference via Lyks    As the provider I attest to compliance with applicable laws and regulations related to telemedicine.       Service Type:  Individual      Session Start Time: 03:30pm  Session End Time: 04:20pm      Session Length: 38 - 52      Attendees: Patient    Visit Activities (Refresh list every visit): [facility] Only    Diagnostic Assessment Date: 08/11/2021  Treatment Plan Review Date: 11/24/2021  See Flowsheets for today's PHQ-9 and BOO-7 results  Previous PHQ-9:   PHQ-9 SCORE 8/10/2021   PHQ-9 Total Score MyChart 6 (Mild depression)   PHQ-9 Total Score 6     Previous BOO-7:   BOO-7 SCORE 8/10/2021   Total Score 1 (minimal anxiety)   Total Score 1       SID LEVEL:  No flowsheet data found.    DATA  Extended Session (60+ minutes): No  Interactive Complexity: No  Crisis: No  [facility] Patient: No    Treatment Objective(s) Addressed in This Session:  Target Behavior(s): disease management/lifestyle changes      Adjustment Difficulties: will develop coping/problem-solving skills to facilitate more adaptive adjustment    Current Stressors / Issues:  Introduced to ACT treatment utilizing Choice Point  worksheet and set treatment goals.      Progress on Treatment Objective(s) / Homework:  New Objective established this session - PREPARATION (Decided to change - considering how); Intervened by negotiating a change plan and determining options / strategies for behavior change, identifying triggers, exploring social supports, and working towards setting a date to begin behavior change    Motivational Interviewing    MI Intervention: Co-Developed Goal: see Care Plan, Expressed Empathy/Understanding, Supported Autonomy, Collaboration, Evocation, Permission to raise concern or advise, Open-ended questions, Reflections: simple and complex, Change talk (evoked) and Reframe     Change Talk Expressed by the Patient: Desire to change Reasons to change Taking steps    Provider Response to Change Talk: E - Evoked more info from patient about behavior change, A - Affirmed patient's thoughts, decisions, or attempts at behavior change, R - Reflected patient's change talk and S - Summarized patient's change talk statements    Also provided psychoeducation about behavioral health condition, symptoms, and treatment options    Care Plan review completed: Yes    Medication Review:  No current psychiatric medications prescribed    Medication Compliance:  NA    Changes in Health Issues:   None reported    Chemical Use Review:   Substance Use: Chemical use reviewed, no active concerns identified      Tobacco Use: No current tobacco use.      Assessment: Current Emotional / Mental Status (status of significant symptoms):  Risk status (Self / Other harm or suicidal ideation)  Patient denies a history of suicidal ideation, suicide attempts, self-injurious behavior, homicidal ideation, homicidal behavior and and other safety concerns  Patient denies current fears or concerns for personal safety.  Patient denies current or recent suicidal ideation or behaviors.  Patient denies current or recent homicidal ideation or behaviors.  Patient denies  current or recent self injurious behavior or ideation.  Patient denies other safety concerns.  A safety and risk management plan has not been developed at this time, however patient was encouraged to call [place] [phone] should there be a change in any of these risk factors.    Appearance:   Appropriate   Eye Contact:   Good   Psychomotor Behavior: Normal   Attitude:   Cooperative   Orientation:   All  Speech   Rate / Production: Normal    Volume:  Normal   Mood:    Normal  Affect:    Blunted   Thought Content:  Clear   Thought Form:  Coherent  Logical   Insight:    Good     Diagnoses:  1. Adjustment disorder, unspecified type        Collateral Reports Completed:  Not Applicable    Plan: (Homework, other):  Patient was given information about behavioral services and encouraged to schedule a follow up appointment with the clinic [facility] in 2 weeks.  He was also given information about mental health symptoms and treatment options .  CD Recommendations: No indications of CD issues.      Wang Vo PsyD, LP      ______________________________________________________________________    Integrated Primary Care Behavioral Health Treatment Plan    Patient's Name: Monty Fleming  YOB: 1975    Date: 08/24/2021    DSM-V Diagnoses: Adjustment Disorders  309.9 (F43.20) Unspecified  Psychosocial / Contextual Factors: Crohn's disease    WHODAS 2.0 Total Score 8/10/2021   Total Score 22   Total Score MyChart 22       Referral / Collaboration:  Referral to another professional/service is not indicated at this time..    Anticipated number of session or this episode of care: 6-8    MeasurableTreatment Goal(s) related to diagnosis / functional impairment(s)  Goal 1: Patient will report increased value directed behaviors    I will know I've met my goal when I'm doing my projects.       Objective #A (Patient Action)                          Patient will identify the cost of control/avoidance behaviors as a coping  strategy.  Status: New - Date: 08/24/2021     Intervention(s)  [facility] will utilize exercises/worksheets to teach initial concepts (e.g., Choice Point, Life Turnaround, D.O.T.S.)     Objective #B  Patient will practice mindfulness skills.  Status: New - Date: 08/24/2021    Intervention(s)  [facility] will teach various mindfulness techniques (e.g., Notice 5 Things, 10 Mindful breaths, Leaves on a Stream)    Objective #C  Patient will clarify personal values for her life that positively impact behavior choices.  Status: New - Date: 08/24/2021     Intervention(s)  [facility] will help clarify values via exercises (e.g., values card sort) and worksheets (e.g., Anne)     Objective #D  Patient will report increased value determined behaviors with decreased negative impact of symptoms.  Status: New - Date: 08/24/2021     Intervention(s)  [facility] will assign goal setting/committed action homework in service of values    Patient has reviewed and agreed to the above plan.      Darryn Vo PsyD  August 24, 2021

## 2021-08-25 NOTE — TELEPHONE ENCOUNTER
Faxed orders to [phone] to [facility] to have MRE completed.    Val Sanchez RN, BSN  GI/Pulmonary Nurse Care Coordinator  Phone: [phone]  Fax: [phone]

## 2021-09-07 ENCOUNTER — VIRTUAL VISIT (OUTPATIENT)
Dept: PSYCHOLOGY | Facility: CLINIC | Age: 46
End: 2021-09-07
Payer: COMMERCIAL

## 2021-09-07 DIAGNOSIS — F43.20 ADJUSTMENT DISORDER, UNSPECIFIED TYPE: Primary | ICD-10-CM

## 2021-09-07 PROCEDURE — 90832 PSYTX W PT 30 MINUTES: CPT | Mod: 95 | Performed by: PSYCHOLOGIST

## 2021-09-07 NOTE — PROGRESS NOTES
[facility]: Integrated Behavioral Health  September 7, 2021      Behavioral Health Clinician Progress Note    Patient Name: Monty Fleming      Telemedicine Visit: The patient's condition can be safely assessed and treated via synchronous audio and visual telemedicine encounter.      Reason for Telemedicine Visit: Services only offered telehealth    Originating Site (Patient Location): Patient's home    Distant Site (Provider Location): Provider Remote Setting- Home Office    Consent:  The patient/guardian has verbally consented to: the potential risks and benefits of telemedicine (video visit) versus in person care; bill my insurance or make self-payment for services provided; and responsibility for payment of non-covered services.     Mode of Communication:  Video Conference via [employer]    As the provider I attest to compliance with applicable laws and regulations related to telemedicine.       Service Type:  Individual      Session Start Time: 11:00am  Session End Time: 11:40am      Session Length: 38 - 52      Attendees: Patient    Visit Activities (Refresh list every visit): [facility] Only    Diagnostic Assessment Date: 08/11/2021  Treatment Plan Review Date: 11/24/2021  See Flowsheets for today's PHQ-9 and BOO-7 results  Previous PHQ-9:   PHQ-9 SCORE 8/10/2021   PHQ-9 Total Score MyChart 6 (Mild depression)   PHQ-9 Total Score 6     Previous BOO-7:   BOO-7 SCORE 8/10/2021   Total Score 1 (minimal anxiety)   Total Score 1       SID LEVEL:  No flowsheet data found.    DATA  Extended Session (60+ minutes): No  Interactive Complexity: No  Crisis: No  [facility] Patient: No    Treatment Objective(s) Addressed in This Session:  Target Behavior(s): disease management/lifestyle changes      Adjustment Difficulties: will develop coping/problem-solving skills to facilitate more adaptive adjustment    Current Stressors / Issues:  Reported he was becoming more aware of the way he  spends his time; choosing to do things that are meaningful. he noted that he struggles with not being very productive at times and feels he is letting his wife down. He was introduced to activity pacing and taught ways he can utilize this to assist with pain management while remaining active/productive.       Progress on Treatment Objective(s) / Homework:  Satisfactory progress - ACTION (Actively working towards change); Intervened by reinforcing change plan / affirming steps taken    Motivational Interviewing    MI Intervention: Expressed Empathy/Understanding, Supported Autonomy, Collaboration, Evocation, Permission to raise concern or advise, Open-ended questions, Reflections: simple and complex, Change talk (evoked) and Reframe     Change Talk Expressed by the Patient: Desire to change Reasons to change Taking steps    Provider Response to Change Talk: E - Evoked more info from patient about behavior change, A - Affirmed patient's thoughts, decisions, or attempts at behavior change, R - Reflected patient's change talk and S - Summarized patient's change talk statements    Also provided psychoeducation about behavioral health condition, symptoms, and treatment options    Care Plan review completed: Yes    Medication Review:  No current psychiatric medications prescribed    Medication Compliance:  NA    Changes in Health Issues:   None reported    Chemical Use Review:   Substance Use: Chemical use reviewed, no active concerns identified      Tobacco Use: No current tobacco use.      Assessment: Current Emotional / Mental Status (status of significant symptoms):  Risk status (Self / Other harm or suicidal ideation)  Patient denies a history of suicidal ideation, suicide attempts, self-injurious behavior, homicidal ideation, homicidal behavior and and other safety concerns  Patient denies current fears or concerns for personal safety.  Patient denies current or recent suicidal ideation or behaviors.  Patient denies  current or recent homicidal ideation or behaviors.  Patient denies current or recent self injurious behavior or ideation.  Patient denies other safety concerns.  A safety and risk management plan has not been developed at this time, however patient was encouraged to call [place] [phone] should there be a change in any of these risk factors.    Appearance:   Appropriate   Eye Contact:   Good   Psychomotor Behavior: Normal   Attitude:   Cooperative   Orientation:   All  Speech   Rate / Production: Normal    Volume:  Normal   Mood:    Normal  Affect:    Blunted   Thought Content:  Clear   Thought Form:  Coherent  Logical   Insight:    Good     Diagnoses:  1. Adjustment disorder, unspecified type        Collateral Reports Completed:  Not Applicable    Plan: (Homework, other):  Patient was given information about behavioral services and encouraged to schedule a follow up appointment with the clinic [facility] in 2 weeks.  He was also given information about mental health symptoms and treatment options .  CD Recommendations: No indications of CD issues.      Wang Vo PsyD,       ______________________________________________________________________    Integrated Primary Care Behavioral Health Treatment Plan    Patient's Name: Monty Fleming  YOB: 1975    Date: 08/24/2021    DSM-V Diagnoses: Adjustment Disorders  309.9 (F43.20) Unspecified  Psychosocial / Contextual Factors: Crohn's disease    WHODAS 2.0 Total Score 8/10/2021   Total Score 22   Total Score MyChart 22       Referral / Collaboration:  Referral to another professional/service is not indicated at this time..    Anticipated number of session or this episode of care: 6-8    MeasurableTreatment Goal(s) related to diagnosis / functional impairment(s)  Goal 1: Patient will report increased value directed behaviors    I will know I've met my goal when I'm doing my projects.       Objective #A (Patient Action)                          Patient will  identify the cost of control/avoidance behaviors as a coping strategy.  Status: New - Date: 08/24/2021     Intervention(s)  [facility] will utilize exercises/worksheets to teach initial concepts (e.g., Choice Point, Life Turnaround, D.O.T.S.)     Objective #B  Patient will practice mindfulness skills.  Status: New - Date: 08/24/2021    Intervention(s)  [facility] will teach various mindfulness techniques (e.g., Notice 5 Things, 10 Mindful breaths, Leaves on a Stream)    Objective #C  Patient will clarify personal values for her life that positively impact behavior choices.  Status: New - Date: 08/24/2021     Intervention(s)  [facility] will help clarify values via exercises (e.g., values card sort) and worksheets (e.g., Anne)     Objective #D  Patient will report increased value determined behaviors with decreased negative impact of symptoms.  Status: New - Date: 08/24/2021     Intervention(s)  [facility] will assign goal setting/committed action homework in service of values    Patient has reviewed and agreed to the above plan.      Darryn Vo PsyD  August 24, 2021

## 2021-09-21 ENCOUNTER — VIRTUAL VISIT (OUTPATIENT)
Dept: PSYCHOLOGY | Facility: CLINIC | Age: 46
End: 2021-09-21
Payer: COMMERCIAL

## 2021-09-21 ENCOUNTER — TRANSFERRED RECORDS (OUTPATIENT)
Dept: HEALTH INFORMATION MANAGEMENT | Facility: CLINIC | Age: 46
End: 2021-09-21

## 2021-09-21 DIAGNOSIS — F43.20 ADJUSTMENT DISORDER, UNSPECIFIED TYPE: Primary | ICD-10-CM

## 2021-09-21 PROCEDURE — 90832 PSYTX W PT 30 MINUTES: CPT | Mod: 95 | Performed by: PSYCHOLOGIST

## 2021-09-21 NOTE — PROGRESS NOTES
[facility]: Integrated Behavioral Health  September 21, 2021      Behavioral Health Clinician Progress Note    Patient Name: Monty Fleming      Telemedicine Visit: The patient's condition can be safely assessed and treated via synchronous audio and visual telemedicine encounter.      Reason for Telemedicine Visit: Services only offered telehealth    Originating Site (Patient Location): Patient's home    Distant Site (Provider Location): Provider Remote Setting- Home Office    Consent:  The patient/guardian has verbally consented to: the potential risks and benefits of telemedicine (video visit) versus in person care; bill my insurance or make self-payment for services provided; and responsibility for payment of non-covered services.     Mode of Communication:  Video Conference via [employer]    As the provider I attest to compliance with applicable laws and regulations related to telemedicine.       Service Type:  Individual      Session Start Time: 11:00am  Session End Time: 11:25am      Session Length: 16 - 37      Attendees: Patient    Visit Activities (Refresh list every visit): [facility] Only    Diagnostic Assessment Date: 08/11/2021  Treatment Plan Review Date: 11/24/2021  See Flowsheets for today's PHQ-9 and BOO-7 results  Previous PHQ-9:   PHQ-9 SCORE 8/10/2021   PHQ-9 Total Score MyChart 6 (Mild depression)   PHQ-9 Total Score 6     Previous BOO-7:   BOO-7 SCORE 8/10/2021   Total Score 1 (minimal anxiety)   Total Score 1       SID LEVEL:  No flowsheet data found.    DATA  Extended Session (60+ minutes): No  Interactive Complexity: No  Crisis: No  [facility] Patient: No    Treatment Objective(s) Addressed in This Session:  Target Behavior(s): disease management/lifestyle changes      Adjustment Difficulties: will develop coping/problem-solving skills to facilitate more adaptive adjustment    Current Stressors / Issues:  Reported he has utilized activity pacing more  frequently. He has noticed some improvement when he remembers to take breaks though it is counter-intuitive. He spoke about some challenges when he travels for work and we identified ways he can still utilize these skills. He would like to have more time to practice and utilize these skills and scheduled for 1 month out.      Progress on Treatment Objective(s) / Homework:  Satisfactory progress - ACTION (Actively working towards change); Intervened by reinforcing change plan / affirming steps taken    Motivational Interviewing    MI Intervention: Expressed Empathy/Understanding, Supported Autonomy, Collaboration, Evocation, Permission to raise concern or advise, Open-ended questions, Reflections: simple and complex, Change talk (evoked) and Reframe     Change Talk Expressed by the Patient: Desire to change Reasons to change Taking steps    Provider Response to Change Talk: E - Evoked more info from patient about behavior change, A - Affirmed patient's thoughts, decisions, or attempts at behavior change, R - Reflected patient's change talk and S - Summarized patient's change talk statements    Also provided psychoeducation about behavioral health condition, symptoms, and treatment options    Care Plan review completed: Yes    Medication Review:  No current psychiatric medications prescribed    Medication Compliance:  NA    Changes in Health Issues:   None reported    Chemical Use Review:   Substance Use: Chemical use reviewed, no active concerns identified      Tobacco Use: No current tobacco use.      Assessment: Current Emotional / Mental Status (status of significant symptoms):  Risk status (Self / Other harm or suicidal ideation)  Patient denies a history of suicidal ideation, suicide attempts, self-injurious behavior, homicidal ideation, homicidal behavior and and other safety concerns  Patient denies current fears or concerns for personal safety.  Patient denies current or recent suicidal ideation or  behaviors.  Patient denies current or recent homicidal ideation or behaviors.  Patient denies current or recent self injurious behavior or ideation.  Patient denies other safety concerns.  A safety and risk management plan has not been developed at this time, however patient was encouraged to call [place] [phone] should there be a change in any of these risk factors.    Appearance:   Appropriate   Eye Contact:   Good   Psychomotor Behavior: Normal   Attitude:   Cooperative   Orientation:   All  Speech   Rate / Production: Normal    Volume:  Normal   Mood:    Normal  Affect:    Appropriate   Thought Content:  Clear   Thought Form:  Coherent  Logical   Insight:    Good     Diagnoses:  1. Adjustment disorder, unspecified type        Collateral Reports Completed:  Not Applicable    Plan: (Homework, other):  Patient was given information about behavioral services and encouraged to schedule a follow up appointment with the clinic [facility] in 1 month.  He was also given information about mental health symptoms and treatment options .  CD Recommendations: No indications of CD issues.      Wang Vo PsyD, LP      ______________________________________________________________________    Integrated Primary Care Behavioral Health Treatment Plan    Patient's Name: Monty Fleming  YOB: 1975    Date: 08/24/2021    DSM-V Diagnoses: Adjustment Disorders  309.9 (F43.20) Unspecified  Psychosocial / Contextual Factors: Crohn's disease    WHODAS 2.0 Total Score 8/10/2021   Total Score 22   Total Score MyChart 22       Referral / Collaboration:  Referral to another professional/service is not indicated at this time..    Anticipated number of session or this episode of care: 6-8    MeasurableTreatment Goal(s) related to diagnosis / functional impairment(s)  Goal 1: Patient will report increased value directed behaviors    I will know I've met my goal when I'm doing my projects.       Objective #A (Patient Action)                           Patient will identify the cost of control/avoidance behaviors as a coping strategy.  Status: New - Date: 08/24/2021     Intervention(s)  [facility] will utilize exercises/worksheets to teach initial concepts (e.g., Choice Point, Life Turnaround, D.O.T.S.)     Objective #B  Patient will practice mindfulness skills.  Status: New - Date: 08/24/2021    Intervention(s)  [facility] will teach various mindfulness techniques (e.g., Notice 5 Things, 10 Mindful breaths, Leaves on a Stream)    Objective #C  Patient will clarify personal values for her life that positively impact behavior choices.  Status: New - Date: 08/24/2021     Intervention(s)  [facility] will help clarify values via exercises (e.g., values card sort) and worksheets (e.g., Anne)     Objective #D  Patient will report increased value determined behaviors with decreased negative impact of symptoms.  Status: New - Date: 08/24/2021     Intervention(s)  [facility] will assign goal setting/committed action homework in service of values    Patient has reviewed and agreed to the above plan.      Darryn Vo PsyD  August 24, 2021

## 2021-09-27 ENCOUNTER — TELEPHONE (OUTPATIENT)
Dept: GASTROENTEROLOGY | Facility: CLINIC | Age: 46
End: 2021-09-27

## 2021-09-27 NOTE — TELEPHONE ENCOUNTER
Fax received from [facility] with results for labs (CRP, CBC/Diff, CMP and ESR) ordered by Dr. Beck.   Faxed for STAT scanning.     Shraddha Gomez RN

## 2021-10-11 ENCOUNTER — HEALTH MAINTENANCE LETTER (OUTPATIENT)
Age: 46
End: 2021-10-11

## 2021-10-29 ENCOUNTER — TELEPHONE (OUTPATIENT)
Dept: GASTROENTEROLOGY | Facility: CLINIC | Age: 46
End: 2021-10-29

## 2021-10-29 NOTE — TELEPHONE ENCOUNTER
PA Initiation    Medication: HUMIRA   Insurance Company: [employer] - Phone [phone] Fax [phone]  Pharmacy Filling the Rx: [address]  Filling Pharmacy Phone:    Filling Pharmacy Fax:    Start Date: 10/29/2021    ANTONIO MI (Rosario: [id number])

## 2021-10-30 DIAGNOSIS — K50.80 CROHN'S DISEASE OF BOTH SMALL AND LARGE INTESTINE WITHOUT COMPLICATION (H): ICD-10-CM

## 2021-11-01 RX ORDER — ADALIMUMAB 40MG/0.4ML
KIT SUBCUTANEOUS
Qty: 4 EACH | Refills: 11 | Status: SHIPPED | OUTPATIENT
Start: 2021-11-01 | End: 2022-10-27

## 2021-11-02 NOTE — TELEPHONE ENCOUNTER
Prior Authorization Approval    Authorization Effective Date: 11/2/2021  Authorization Expiration Date: 11/1/2022  Medication: HUMIRA  - APPROVED   Approved Dose/Quantity:  4 FOR 28 DAYS   Reference #:     Insurance Company: [employer] - Phone [phone] Fax [phone]  Expected CoPay:       CoPay Card Available:      Foundation Assistance Needed:    Which Pharmacy is filling the prescription (Not needed for infusion/clinic administered): [facility] - [facility]LSY [address]  Pharmacy Notified: Yes  Patient Notified: Yes

## 2021-11-10 ENCOUNTER — VIRTUAL VISIT (OUTPATIENT)
Dept: GASTROENTEROLOGY | Facility: CLINIC | Age: 46
End: 2021-11-10
Payer: COMMERCIAL

## 2021-11-10 DIAGNOSIS — R11.0 NAUSEA: ICD-10-CM

## 2021-11-10 DIAGNOSIS — Z79.899 HIGH RISK MEDICATION USE: ICD-10-CM

## 2021-11-10 DIAGNOSIS — K50.80 CROHN'S DISEASE OF BOTH SMALL AND LARGE INTESTINE WITHOUT COMPLICATION (H): ICD-10-CM

## 2021-11-10 DIAGNOSIS — K21.9 GASTROESOPHAGEAL REFLUX DISEASE, UNSPECIFIED WHETHER ESOPHAGITIS PRESENT: Primary | ICD-10-CM

## 2021-11-10 DIAGNOSIS — R10.12 LUQ ABDOMINAL PAIN: ICD-10-CM

## 2021-11-10 DIAGNOSIS — K59.00 CONSTIPATION, UNSPECIFIED CONSTIPATION TYPE: ICD-10-CM

## 2021-11-10 PROCEDURE — 99214 OFFICE O/P EST MOD 30 MIN: CPT | Mod: 95 | Performed by: INTERNAL MEDICINE

## 2021-11-10 RX ORDER — FAMOTIDINE 40 MG/1
40 TABLET, FILM COATED ORAL
Qty: 30 TABLET | Refills: 3 | Status: SHIPPED | OUTPATIENT
Start: 2021-11-10 | End: 2022-05-04

## 2021-11-10 NOTE — PATIENT INSTRUCTIONS
Try using pepcid (famotidine) at bedtime to see if it helps with nausea in the morning.  If it helps a little but doesn't resolve completely, we may consider a trial of omeprazole (prilosec).    Hold your linzess until your diarrhea resolves and then start taking it again once a day.  If once a day isn't enough to control constipation, you can try adding miralax at bedtime.    Please let me know if your diarrhea isn't better in a few days.

## 2021-11-10 NOTE — PROGRESS NOTES
Monty is a 45 year old who is being evaluated via a billable video visit.      How would you like to obtain your AVS? MyChart  If the video visit is dropped, the invitation should be resent by: Text to cell phone: [phone]  Will anyone else be joining your video visit? No      Prachi Sterling LPN on 11/10/21 at 9:40 AM

## 2021-11-10 NOTE — PROGRESS NOTES
HPI:    Monty  presents today for a follow-up visit.  Has overall been doing well lately although last week had some more significant constipation and now the last few days has been having more diarrhea.  Also with some nausea in the morning.  No heartburn.  Some left sided abdominal pain with eating and feeling like he gets full quickly.      No blood in the stool.  Had MRE in September that was normal.  Recent labs with PCP reviewed and all normal as well.    Is considering getting lasix - first provider he went to was concerned regarding his history of crohns - is planning to see someone else within a few weeks.        Social History     Tobacco Use     Smoking status: Not on file     Smokeless tobacco: Not on file   Substance Use Topics     Alcohol use: Not on file        O:    Gen: no acute distress  HEENT: NCAT  Neck: normal ROM  Resp: nonlabored breathing  Neuro: no gross deficits  Psych: appropriate mood and affect    Assessment and Plan:    # crohns colitis/ileitis - recent MRE wnl as were recent labs.  Continue weekly humira    # constipation with alternating diarrhea - more diarrhea at present but did have significant constipation last week - advised to hold linzess until diarrhea resolves - if no improvement in the next few days will check stool studies including a fecal calprotectin.  If constipation returns and not completely controlled with linzess, advised to add miralax at bedtime.    # nausea, LUQ pain - ?GERD/gastritis - will give a trial of pepcid at bedtime.  Can also continue to use enteric coated peppermint oil PRN for pain.     Vaccinations:  -- Influenza (every year): encourage to receive when available  -- TdaP (every 10 years): received last year  -- Pneumococcal Pneumonia (once then every 5 years): Last given will address at next visit - received prevnar in 2019 - will discuss pneumovax  -- Yearly assessment for latent Tb (verbal screening and exam, PPD or QuantiFERON-Tb testing):  Last obtained 2018 negative  - Covid vaccine - received 2021     One time confirmation of immunity or serologies:  -- Hepatitis A (serologies or immunizations): will discuss vaccine at next visit  -- Hepatitis B (serologies or immunizations): negative in 2018 - will discuss vaccine at next visit  -- MMR: will address at next visit  -- HPV (all aged 18-26): n/a  -- Meningococcal meningitis (all patients at risk for meningitis): N/a  -- Due to the immunosuppression in this patient, I would not advise administration of live vaccines such as varicella/VZV, intranasal influenza, MMR, or yellow fever vaccine (if travelling).       Bone mineral density screening   -- Recommend all patients supplement with calcium and vitamin D  -- Given prior steroid use recommend DEXA if not already done     Cancer Screening:  Colon cancer screening:     Skin cancer screening: Annual visual exam of skin by dermatologist since patient is immunocompromised - discussed with patient today given his history - referral placed to dermatology     Depression Screening:  -- Discussed again today - patient would like to speak with health psychologist - referral placed today     Misc:  -- Avoid tobacco use  -- Avoid NSAIDs as there is potentially a 25% chance of causing an IBD flare    RTC 3 months    Ruby Beck, DO     Video-Visit Details     Type of service:  Video Visit     Video Start Time: 10:31 PM  Video End Time (time video stopped): 10:48 PM    Originating Location (pt. Location): home     Distant Location (provider location):  [facility]      Mode of Communication:  Video Conference via [employer]

## 2022-02-11 ENCOUNTER — TRANSFERRED RECORDS (OUTPATIENT)
Dept: HEALTH INFORMATION MANAGEMENT | Facility: CLINIC | Age: 47
End: 2022-02-11
Payer: COMMERCIAL

## 2022-02-11 DIAGNOSIS — R10.84 ABDOMINAL PAIN, GENERALIZED: ICD-10-CM

## 2022-02-11 DIAGNOSIS — K50.80 CROHN'S DISEASE OF BOTH SMALL AND LARGE INTESTINE WITHOUT COMPLICATION (H): Primary | ICD-10-CM

## 2022-02-11 DIAGNOSIS — R19.7 DIARRHEA, UNSPECIFIED TYPE: ICD-10-CM

## 2022-02-11 LAB
ALT SERPL-CCNC: 48 IU/L (ref 0–44)
AST SERPL-CCNC: 36 IU/L (ref 0–40)
CREATININE (EXTERNAL): 0.71 MG/DL (ref 0.76–1.27)
GFR ESTIMATED (EXTERNAL): 112 ML/MIN/1.73
GFR ESTIMATED (IF AFRICAN AMERICAN) (EXTERNAL): 129 ML/MIN/1.73
GLUCOSE (EXTERNAL): 165 MG/DL (ref 65–99)
POTASSIUM (EXTERNAL): 4 MMOL/L (ref 3.5–5.2)

## 2022-02-14 ENCOUNTER — TRANSFERRED RECORDS (OUTPATIENT)
Dept: HEALTH INFORMATION MANAGEMENT | Facility: CLINIC | Age: 47
End: 2022-02-14
Payer: COMMERCIAL

## 2022-02-23 DIAGNOSIS — K50.80 CROHN'S DISEASE OF BOTH SMALL AND LARGE INTESTINE WITHOUT COMPLICATION (H): Primary | ICD-10-CM

## 2022-03-01 DIAGNOSIS — R10.84 ABDOMINAL PAIN, GENERALIZED: Primary | ICD-10-CM

## 2022-03-01 RX ORDER — DICYCLOMINE HYDROCHLORIDE 10 MG/1
10 CAPSULE ORAL 4 TIMES DAILY PRN
Qty: 90 CAPSULE | Refills: 3 | Status: SHIPPED | OUTPATIENT
Start: 2022-03-01 | End: 2023-05-24

## 2022-05-02 ASSESSMENT — ENCOUNTER SYMPTOMS
JAUNDICE: 0
DIARRHEA: 1
COUGH DISTURBING SLEEP: 0
SPUTUM PRODUCTION: 0
SHORTNESS OF BREATH: 0
BOWEL INCONTINENCE: 0
NAUSEA: 1
ARTHRALGIAS: 1
HEMOPTYSIS: 0
HEARTBURN: 0
POSTURAL DYSPNEA: 0
SNORES LOUDLY: 1
BACK PAIN: 0
MYALGIAS: 0
CONSTIPATION: 1
WHEEZING: 1
VOMITING: 0
BLOATING: 1
BLOOD IN STOOL: 0
ABDOMINAL PAIN: 1
MUSCLE CRAMPS: 0
MUSCLE WEAKNESS: 0
RECTAL PAIN: 1
JOINT SWELLING: 0
DYSPNEA ON EXERTION: 0
COUGH: 0
NECK PAIN: 0
STIFFNESS: 1

## 2022-05-04 ENCOUNTER — VIRTUAL VISIT (OUTPATIENT)
Dept: GASTROENTEROLOGY | Facility: CLINIC | Age: 47
End: 2022-05-04
Payer: COMMERCIAL

## 2022-05-04 DIAGNOSIS — K59.03 DRUG-INDUCED CONSTIPATION: ICD-10-CM

## 2022-05-04 DIAGNOSIS — K21.9 GASTROESOPHAGEAL REFLUX DISEASE, UNSPECIFIED WHETHER ESOPHAGITIS PRESENT: ICD-10-CM

## 2022-05-04 DIAGNOSIS — K50.80 CROHN'S DISEASE OF BOTH SMALL AND LARGE INTESTINE WITHOUT COMPLICATION (H): Primary | ICD-10-CM

## 2022-05-04 PROCEDURE — 99214 OFFICE O/P EST MOD 30 MIN: CPT | Mod: 95 | Performed by: INTERNAL MEDICINE

## 2022-05-04 RX ORDER — FAMOTIDINE 40 MG/1
40 TABLET, FILM COATED ORAL
Qty: 30 TABLET | Refills: 3 | Status: SHIPPED | OUTPATIENT
Start: 2022-05-04 | End: 2022-11-09

## 2022-05-04 NOTE — PROGRESS NOTES
HPI:    Bilyl presents today for a video visit for follow-up of crohns.  Currently alternating between diarrhea and constipation.  Has been traveling more for work so is wondering if that is contributing.  Uses miralax as needed for constipation.  Not taking linzess as it causes diarrhea. Uses bentyl occasionally - unsure if it helped.    Some LUQ discomfort.  Generally occurs with laying on his side - did have an episode where he was pressing down on something that aggravated it.  Sometimes seems worse with eating.  Tried biofreeze - doesn't know if it helped    Still nauseated in the morning - gets better as the day goes on.  Was using pepcid - thinks it was helping but stopped it when his Rx ran out.      Social History     Tobacco Use     Smoking status: Never Smoker     Smokeless tobacco: Never Used   Substance Use Topics     Alcohol use: Not on file        O:    Gen: no acute distress  HEENT: NCAT  Neck: normal ROM  Resp: nonlabored breathing  Neuro: no gross deficits  Psych: appropriate mood and affect    Assessment and Plan:    # crohns colitis/ileitis - on weekly humira - continues to alternate between diarrhea and constipation - did have more significant diarrhea a few months ago - stool studies including calprotectin were unrevealing as was abdominal x-ray (to assess stool burden given history of chronic constipation).  Likely related to overlap of irritable bowel, however, is reasonable to repeat ileocolonoscopy for further evaluation especially given history of colonic stricture. Will continue PRN anti-spasmodics for now.  Can use imodium as needed especially with travel as patient frequently does for work.     # LUQ pain - could be MSK component given somewhat positional nature, although does seem worse with eating.  No improvement with topical therapies.  Will do EGD at the time of colonoscopy for further evaluation. Will also add pepcid at bedtime     Vaccinations:  -- Influenza (every year):  encourage to receive when available  -- TdaP (every 10 years): received last year  -- Pneumococcal Pneumonia (once then every 5 years): Last given will address at next visit - received prevnar in 2019 - will discuss pneumovax  -- Yearly assessment for latent Tb (verbal screening and exam, PPD or QuantiFERON-Tb testing): Last obtained 2018 negative  - Covid vaccine - received 2021     One time confirmation of immunity or serologies:  -- Hepatitis A (serologies or immunizations): will discuss vaccine at next visit  -- Hepatitis B (serologies or immunizations): negative in 2018 - will discuss vaccine at next visit  -- MMR: will address at next visit  -- HPV (all aged 18-26): n/a  -- Meningococcal meningitis (all patients at risk for meningitis): N/a  -- Due to the immunosuppression in this patient, I would not advise administration of live vaccines such as varicella/VZV, intranasal influenza, MMR, or yellow fever vaccine (if travelling).       Bone mineral density screening   -- Recommend all patients supplement with calcium and vitamin D  -- Given prior steroid use recommend DEXA if not already done     Cancer Screening:  Colon cancer screening:     Skin cancer screening: Annual visual exam of skin by dermatologist since patient is immunocompromised - discussed with patient today given his history - referral placed to dermatology     Depression Screening:  -- Discussed again today - patient would like to speak with health psychologist - referral placed today     Misc:  -- Avoid tobacco use  -- Avoid NSAIDs as there is potentially a 25% chance of causing an IBD flare    RTC 6 months    Ruby Beck, DO     Video-Visit Details     Type of service:  Video Visit     Video Start Time: 2:30 PM  Video End Time (time video stopped):     Originating Location (pt. Location): home     Distant Location (provider location):  [facility]      Mode of Communication:  Video Conference via MILI  for HPI/ROS submitted by the patient on 5/2/2022  General Symptoms: No  Skin Symptoms: No  HENT Symptoms: No  EYE SYMPTOMS: No  HEART SYMPTOMS: No  LUNG SYMPTOMS: Yes  INTESTINAL SYMPTOMS: Yes  URINARY SYMPTOMS: No  REPRODUCTIVE SYMPTOMS: No  SKELETAL SYMPTOMS: Yes  BLOOD SYMPTOMS: No  NERVOUS SYSTEM SYMPTOMS: No  MENTAL HEALTH SYMPTOMS: No  Cough: No  Sputum or phlegm: No  Coughing up blood: No  Difficulty breating or shortness of breath: No  Snoring: Yes  Wheezing: Yes  Difficulty breathing on exertion: No  Nighttime Cough: No  Difficulty breathing when lying flat: No  Heart burn or indigestion: No  Nausea: Yes  Vomiting: No  Abdominal pain: Yes  Bloating: Yes  Constipation: Yes  Diarrhea: Yes  Blood in stool: No  Black stools: No  Rectal or Anal pain: Yes  Fecal incontinence: No  Yellowing of skin or eyes: No  Vomit with blood: No  Change in stools: Yes  Back pain: No  Muscle aches: No  Neck pain: No  Swollen joints: No  Joint pain: Yes  Bone pain: No  Muscle cramps: No  Muscle weakness: No  Joint stiffness: Yes  Bone fracture: No

## 2022-05-04 NOTE — PROGRESS NOTES
Monty is a 46 year old who is being evaluated via a billable video visit.      Patient confirms medications and allergies are accurate via patients echeck in completion, and or denies any changes since last reviewed/verified.     Divina Sosa, Virtual Facilitator    How would you like to obtain your AVS? MyChart  If the video visit is dropped, the invitation should be resent by: Send to e-mail at: [email or web address]  Will anyone else be joining your video visit? No       Divina Sosa

## 2022-05-04 NOTE — PATIENT INSTRUCTIONS
Please call [phone] to schedule an endoscopy and colonoscopy.    Resume the pepcid (famotidine) at bedtime to see if it helps.    Continue to use the bentyl as needed    Use lactase enzyme prior to eating dairy    Try to stay closer to your regular diet when you are traveling.  You can try using imodium before car/plane trips and or meals out if you are worried about diarrhea (but be cautious as it can easily cause constipation).  Use bentyl (dicyclomine) as needed for pain/cramping

## 2022-05-05 ENCOUNTER — TELEPHONE (OUTPATIENT)
Dept: GASTROENTEROLOGY | Facility: CLINIC | Age: 47
End: 2022-05-05
Payer: COMMERCIAL

## 2022-05-05 NOTE — TELEPHONE ENCOUNTER
Screening Questions  BlueKIND OF PREP RedLOCATION [review exclusion criteria] GreenSEDATION TYPE  1. Have you had a positive covid test in the last 90 days? N     2. Do you have a legal guardian or medical Power of ?  Are you able to give consent for your medical care?Y (Sedation review/consideration needed)    3. Are you active on mychart? Y    4. What insurance is in the chart? [employer]     3.   Ordering/Referring Provider: Ruby Beck,    4. BMI 30.3 [BMI OVER 40-EXTENDED PREP]  If greater than 40 review exclusion criteria [PAC APPT IF @ [facility]]        5.  Respiratory Screening :  [If yes to any of the following HOSPITAL setting only]     Do you use daily home oxygen? N    Do you have mod to severe Obstructive Sleep Apnea? Y CPA  [OKAY @ [facility] SH PH RI]   Do you have Pulmonary Hypertension? N     Do you have UNCONTROLLED asthma? N        6.   Have you had a heart or lung transplant? N      7.   Are you currently on dialysis? N [ If yes, G-PREP & HOSPITAL setting only]     8.   Do you have chronic kidney disease? N [ If yes, G-PREP ]    9.   Have you had a stroke or Transient ischemic attack (TIA - aka  mini stroke ) within 6 months?  N (If yes, please review exclusion criteria)    10.   In the past 6 months, have you had any heart related issues including cardiomyopathy or heart attack? N           If yes, did it require cardiac stenting or other implantable device? N      11.   Do you have any implantable devices in your body (pacemaker, defib, LVAD)? N (If yes, please review exclusion criteria)    12.   Do you take nitroglycerin? N           If yes, how often? N  (if yes, HOSPITAL setting ONLY)    13.   Are you currently taking any blood thinners? N           [IF YES, INFORM PATIENT TO FOLLOW UP W/ ORDERING PROVIDER FOR BRIDGING INSTRUCTIONS]     14.   Do you have a diagnosis of diabetes? N   [ If yes, G-PREP ]    15.   [FEMALES] Are you currently pregnant?     If yes, how many  weeks?     16.   Are you taking any prescription pain medications on a routine schedule?  N  [ If yes, EXTENDED PREP.] [If yes, MAC]    17.   Do you have any chemical dependencies such as alcohol, street drugs, or methadone?  N [If yes, MAC]    18.   Do you have any history of post-traumatic stress syndrome, severe anxiety or history of psychosis?  N  [If yes, MAC]    19.   Do you transfer independently?  Y    20.  On a regular basis do you go 3-5 days between bowel movements? Y   [ If yes, EXTENDED PREP.]    21.   Preferred LOCAL Pharmacy for Pre Prescription      [facility] [address] [place] [address] N.        Scheduling Details      Caller : Monty Fleming  (Please ask for phone number if not scheduled by patient)    Type of Procedure Scheduled: colon and egd  Which Colonoscopy Prep was Sent?: extended  KHORUTS CF PATIENTS & GROEN'S PATIENTS NEEDS EXTENDED PREP  Surgeon:   Date of Procedure:   Location:     Ruby Beck DO [place], [place]  I would be fine with any of those locations - he should be done by a gastroenterologist (preferably one who does IBD).   Thanks!   Ruby             Previous Messages       ----- Message -----   From: Cathie Cleaning   Sent: 5/5/2022   9:41 AM CDT   To: Ruby Beck DO   Subject: hospital question                                 [facility] Monty Brooks has sleep apnea so with that he can't go to  to have his procedures. I offered [facility] for him to go and he does not want to go that far since he does live in [place] and does not know where that even is.     Would you be ok having another MD do his procedures at either , [facility] or [facility]?     Thank you!     Cathie Russell Scheduling              Sedation Type: MAC  Conscious Sedation- Needs  for 6 hours after the procedure  MAC/General-Needs  for 24 hours after procedure    Pre-op Required at [facility], [place], [place] and OR for MAC sedation: Y  (advise patient they will need a pre-op  prior to procedure -)      Informed patient they will need an adult  y  Cannot take any type of public or medical transportation alone    Pre-Procedure Covid test to be completed at [facility] or Externally:     Confirmed Nurse will call to complete assessment y    Additional comments: I will need to call Monty back. Due to his SA he needs to go to a Hospital. I offered [facility] as  goes there as well and he stated that is too far for him to go to and wants to see if she will let him go elsewhere with another MD. I will call him back once I know.          Still needs covid test scheduled and Pre-op as well once you have a date secured for him

## 2022-05-06 NOTE — TELEPHONE ENCOUNTER
Scheduling Details      Caller : ANTONIO  (Please ask for phone number if not scheduled by patient)    Type of Procedure Scheduled: EGD, COLON  Which Colonoscopy Prep was Sent?: EXTENDED  KHORUTS CF PATIENTS & GROEN'S PATIENTS NEEDS EXTENDED PREP  Surgeon: JUAN PABLO  Date of Procedure: 6/22  Location:       Sedation Type: MAC  Conscious Sedation- Needs  for 6 hours after the procedure  MAC/General-Needs  for 24 hours after procedure    Pre-op Required at [facility], [place], [place] and OR for MAC sedation: Y  (advise patient they will need a pre-op prior to procedure -)      Informed patient they will need an adult  Y  Cannot take any type of public or medical transportation alone    Pre-Procedure Covid test to be completed at [facility] or Externally: Y    Confirmed Nurse will call to complete assessment Y    Additional comments: N

## 2022-05-09 NOTE — PROGRESS NOTES
[facility] Dermatology Note  Encounter Date: May 11, 2022  Office Visit     Dermatology Problem List:  1. Chron's Disease - Humira since 2018  2. History of DN x2 - patient reported, records requested  - left calf, ~2017  - second DN ~ 2010     Family history: Negative for skin cancer.  Social history: Lived in [place] and [place]. Moved to [place] for job.  ____________________________________________     Assessment & Plan:     # On Humira for Chron's Disease. Discussed increased risk of skin cancers with immunosuppressing medications.   - Recommend sunscreens SPF #30 or greater, protective clothing and avoidance of tanning beds.   - Recommend yearly skin exams.    # Cherry angioma(s) - Review briefly on etiology.    - No further intervention needed.    # Multiple clinically benign nevi.  - Signs and Symptoms of non-melanoma skin cancer and ABCDEs of melanoma reviewed with patient. Patient encouraged to perform monthly self skin exams and educated on how to perform them. UV precautions reviewed with patient. Patient was asked about new or changing moles/lesions on body.   - Sunscreen: Apply 20 minutes prior to going outdoors and reapply every two hours, when wet or sweating. We recommend using an SPF 30 or higher, and to use one that is water resistant.       - No further intervention needed.     # Solar lentigines.  - Sunscreen: Apply 20 minutes prior to going outdoors and reapply every two hours, when wet or sweating. We recommend using an SPF 30 or higher, and to use one that is water resistant.      - No further intervention needed.     # Skin tag - groin. Review on treatment options including cryo. Notes irritation and rubbing sometimes.    - Patient will considered removal in the future.     Procedures Performed:   none    Follow-up: 1 year(s) in-person, or earlier for new or changing lesions    Staff and Scribe:     Scribe Disclosure:   Dewayne PHELPS, am serving as a scribe to document  services personally performed by Lorena Arias PA-C, based on data collection and the provider's statements to me.  Provider Disclosure:   The documentation recorded by the scribe accurately reflects the services I personally performed and the decisions made by me.    All risks, benefits and alternatives were discussed with patient.  Patient is in agreement and understands the assessment and plan.  All questions were answered.    Lorena Arias PA-C, [facility]  [facility]: Phone: [phone], Fax: [phone]  [facility]: Phone: [phone],  Fax: [phone]  [facility]: Phone: [phone], Fax: [phone]  ____________________________________________    CC: Skin Check (FBSC: no areas of concern. Hx of DN. Taking Humira.)    HPI:  Mr. Monty Fleming is a(n) 46 year old male who presents today as a return patient for a skin check.     Last seen on 5/12/21 for a skin check. At that time, no concerning lesions were noted.     Today, patient notes concern on the forehead that has appeared.  States he is currently on Humira weekly for Chron's disease. No changes in doses.     Notes lesions on the groin that rubbed sometimes.     Patient is otherwise feeling well, without additional concerns.    Labs:  NA    Physical Exam:  Vitals: There were no vitals taken for this visit.  SKIN: Total skin excluding the undergarment areas was performed. The exam included the head/face, neck, both arms, chest, back, abdomen, both legs, digits and/or nails.   - Quezada Type 1  - There are dome shaped bright red papules on the trunk and extremities.  - Multiple regular brown pigmented macules and papules are identified on the trunk and extremities. Less than a 100 nevi.   - Scattered brown macules on sun exposed areas.  - No other lesions of concern on areas examined.     Medications:  Current Outpatient Medications    Medication     acetaminophen (TYLENOL) 500 MG tablet     azelastine-fluticasone (DYMISTA) 137-50 MCG/ACT nasal spray     Butalbital-Acetaminophen (PHRENILIN)  MG TABS per tablet     cholecalciferol 25 MCG (1000 UT) TABS     cyanocobalamin (VITAMIN B-12) 1000 MCG tablet     dicyclomine (BENTYL) 10 MG capsule     eletriptan (RELPAX) 20 MG tablet     famotidine (PEPCID) 40 MG tablet     fexofenadine (ALLEGRA) 180 MG tablet     fluticasone (FLONASE) 50 MCG/ACT nasal spray     HUMIRA *CF* PEN 40 MG/0.4ML pen kit     hyoscyamine ER (LEVBID) 375 mcg 12 hr tablet     linaclotide (LINZESS) 145 MCG capsule     linaclotide (LINZESS) 72 MCG capsule     LINZESS 290 MCG capsule     ondansetron (ZOFRAN-ODT) 4 MG ODT tab     Peppermint Oil (IBGARD PO)     polyethylene glycol (MIRALAX) 17 GM/Dose powder     tadalafil (CIALIS) 20 MG tablet     No current facility-administered medications for this visit.      Past Medical History:   There is no problem list on file for this patient.    No past medical history on file.

## 2022-05-11 ENCOUNTER — OFFICE VISIT (OUTPATIENT)
Dept: DERMATOLOGY | Facility: CLINIC | Age: 47
End: 2022-05-11
Payer: COMMERCIAL

## 2022-05-11 DIAGNOSIS — Z86.018 HISTORY OF DYSPLASTIC NEVUS: ICD-10-CM

## 2022-05-11 DIAGNOSIS — L81.4 SOLAR LENTIGO: Primary | ICD-10-CM

## 2022-05-11 DIAGNOSIS — L91.8 SKIN TAG: ICD-10-CM

## 2022-05-11 DIAGNOSIS — D22.9 MULTIPLE BENIGN NEVI: ICD-10-CM

## 2022-05-11 DIAGNOSIS — D18.01 CHERRY ANGIOMA: ICD-10-CM

## 2022-05-11 PROCEDURE — 99213 OFFICE O/P EST LOW 20 MIN: CPT | Performed by: PHYSICIAN ASSISTANT

## 2022-05-11 ASSESSMENT — PAIN SCALES - GENERAL: PAINLEVEL: NO PAIN (0)

## 2022-05-11 NOTE — NURSING NOTE
Monty Fleming's chief complaint for this visit includes:  Chief Complaint   Patient presents with     Skin Check     FBSC: no areas of concern. Hx of DN. Taking Humera.     PCP: Jaron Vines    Referring Provider:  No referring provider defined for this encounter.    There were no vitals taken for this visit.  No Pain (0)      No Known Allergies      Do you need any medication refills at today's visit? No    Amber Daigle, [facility]

## 2022-05-11 NOTE — LETTER
5/11/2022         RE: Monty Fleming  [address]  [place] [address]        Dear Colleague,    Thank you for referring your patient, Monty Fleming, to the [facility]. Please see a copy of my visit note below.    [facility] Dermatology Note  Encounter Date: May 11, 2022  Office Visit     Dermatology Problem List:  1. Chron's Disease - Humira since 2018  2. History of DN x2 - patient reported, records requested  - left calf, ~2017  - second DN ~ 2010     Family history: Negative for skin cancer.  Social history: Lived in [place] and [place]. Moved to [place] for job.  ____________________________________________     Assessment & Plan:     # On Humira for Chron's Disease. Discussed increased risk of skin cancers with immunosuppressing medications.   - Recommend sunscreens SPF #30 or greater, protective clothing and avoidance of tanning beds.   - Recommend yearly skin exams.    # Cherry angioma(s) - Review briefly on etiology.    - No further intervention needed.    # Multiple clinically benign nevi.  - Signs and Symptoms of non-melanoma skin cancer and ABCDEs of melanoma reviewed with patient. Patient encouraged to perform monthly self skin exams and educated on how to perform them. UV precautions reviewed with patient. Patient was asked about new or changing moles/lesions on body.   - Sunscreen: Apply 20 minutes prior to going outdoors and reapply every two hours, when wet or sweating. We recommend using an SPF 30 or higher, and to use one that is water resistant.       - No further intervention needed.     # Solar lentigines.  - Sunscreen: Apply 20 minutes prior to going outdoors and reapply every two hours, when wet or sweating. We recommend using an SPF 30 or higher, and to use one that is water resistant.      - No further intervention needed.     # Skin tag - groin. Review on treatment options including cryo. Notes irritation and rubbing sometimes.    - Patient  will considered removal in the future.     Procedures Performed:   none    Follow-up: 1 year(s) in-person, or earlier for new or changing lesions    Staff and Scribe:     Scribe Disclosure:   I, Dewayne Renteria, am serving as a scribe to document services personally performed by Lorena Arias PA-C, based on data collection and the provider's statements to me.  Provider Disclosure:   The documentation recorded by the scribe accurately reflects the services I personally performed and the decisions made by me.    All risks, benefits and alternatives were discussed with patient.  Patient is in agreement and understands the assessment and plan.  All questions were answered.    Lorena Arias PA-C, [facility]  [facility]: Phone: [phone], Fax: [phone]  [facility]: Phone: [phone],  Fax: [phone]  [facility]: Phone: [phone], Fax: [phone]  ____________________________________________    CC: Skin Check (FBSC: no areas of concern. Hx of DN. Taking Humira.)    HPI:  Mr. Monty Fleming is a(n) 46 year old male who presents today as a return patient for a skin check.     Last seen on 5/12/21 for a skin check. At that time, no concerning lesions were noted.     Today, patient notes concern on the forehead that has appeared.  States he is currently on Humira weekly for Chron's disease. No changes in doses.     Notes lesions on the groin that rubbed sometimes.     Patient is otherwise feeling well, without additional concerns.    Labs:  NA    Physical Exam:  Vitals: There were no vitals taken for this visit.  SKIN: Total skin excluding the undergarment areas was performed. The exam included the head/face, neck, both arms, chest, back, abdomen, both legs, digits and/or nails.   - Quezada Type 1  - There are dome shaped bright red papules on the trunk and extremities.  - Multiple regular brown pigmented  macules and papules are identified on the trunk and extremities. Less than a 100 nevi.   - Scattered brown macules on sun exposed areas.  - No other lesions of concern on areas examined.     Medications:  Current Outpatient Medications   Medication     acetaminophen (TYLENOL) 500 MG tablet     azelastine-fluticasone (DYMISTA) 137-50 MCG/ACT nasal spray     Butalbital-Acetaminophen (PHRENILIN)  MG TABS per tablet     cholecalciferol 25 MCG (1000 UT) TABS     cyanocobalamin (VITAMIN B-12) 1000 MCG tablet     dicyclomine (BENTYL) 10 MG capsule     eletriptan (RELPAX) 20 MG tablet     famotidine (PEPCID) 40 MG tablet     fexofenadine (ALLEGRA) 180 MG tablet     fluticasone (FLONASE) 50 MCG/ACT nasal spray     HUMIRA *CF* PEN 40 MG/0.4ML pen kit     hyoscyamine ER (LEVBID) 375 mcg 12 hr tablet     linaclotide (LINZESS) 145 MCG capsule     linaclotide (LINZESS) 72 MCG capsule     LINZESS 290 MCG capsule     ondansetron (ZOFRAN-ODT) 4 MG ODT tab     Peppermint Oil (IBGARD PO)     polyethylene glycol (MIRALAX) 17 GM/Dose powder     tadalafil (CIALIS) 20 MG tablet     No current facility-administered medications for this visit.      Past Medical History:   There is no problem list on file for this patient.    No past medical history on file.           Again, thank you for allowing me to participate in the care of your patient.        Sincerely,        Lorena Arias PA-C

## 2022-05-19 ENCOUNTER — TELEPHONE (OUTPATIENT)
Dept: GASTROENTEROLOGY | Facility: CLINIC | Age: 47
End: 2022-05-19
Payer: COMMERCIAL

## 2022-05-22 ENCOUNTER — HEALTH MAINTENANCE LETTER (OUTPATIENT)
Age: 47
End: 2022-05-22

## 2022-06-13 ENCOUNTER — TELEPHONE (OUTPATIENT)
Dept: GASTROENTEROLOGY | Facility: CLINIC | Age: 47
End: 2022-06-13

## 2022-06-13 DIAGNOSIS — K50.80 CROHN'S DISEASE OF BOTH SMALL AND LARGE INTESTINE WITHOUT COMPLICATION (H): Primary | ICD-10-CM

## 2022-06-13 RX ORDER — BISACODYL 5 MG/1
TABLET, DELAYED RELEASE ORAL
Qty: 2 TABLET | Refills: 0 | Status: SHIPPED | OUTPATIENT
Start: 2022-06-13 | End: 2023-04-06

## 2022-06-13 NOTE — TELEPHONE ENCOUNTER
[facility]  [place], [place] - [address] [place] [address] N.    Pre assessment questions completed for upcoming colonoscopy procedure scheduled on 6/22/22    Positive Covid test 6/2/22 - patient is aware that they need to be s/s free prior to procedure.      Pre-op scheduled? 6/1/22- [facility]. Patient states that clinic should have already faxed report. Patient does have a copy. Advised patient to bring copy of pre op report to procedure.     Reviewed procedural arrival time 1215 and facility location .    Designated  policy reviewed. Instructed to have someone stay 24 hours post procedure.     Procedure indication: crohns, gerd    Bowel prep recommendation: Extended prep d/t constipation.     Extended prep script sent to [facility] [address]  [place], [place] - [address] [place] [address] N. pharmacy. Prep instructions sent via [employer].    Reviewed Extended prep instructions with patient. No fiber/iron supplements or foods that contain nuts/seeds 5 days prior to procedure.     Anticoagulation/blood thinners? no    Patient verbalized understanding and had no questions or concerns at this time.    Ashleigh Barber RN

## 2022-06-22 ENCOUNTER — HOSPITAL ENCOUNTER (OUTPATIENT)
Facility: CLINIC | Age: 47
Discharge: HOME | End: 2022-06-22
Attending: INTERNAL MEDICINE | Admitting: INTERNAL MEDICINE
Payer: COMMERCIAL

## 2022-06-22 ENCOUNTER — ANESTHESIA (OUTPATIENT)
Dept: GASTROENTEROLOGY | Facility: CLINIC | Age: 47
End: 2022-06-22
Payer: COMMERCIAL

## 2022-06-22 ENCOUNTER — ANESTHESIA EVENT (OUTPATIENT)
Dept: GASTROENTEROLOGY | Facility: CLINIC | Age: 47
End: 2022-06-22
Payer: COMMERCIAL

## 2022-06-22 VITALS
DIASTOLIC BLOOD PRESSURE: 69 MMHG | RESPIRATION RATE: 44 BRPM | OXYGEN SATURATION: 97 % | WEIGHT: 230 LBS | BODY MASS INDEX: 30.48 KG/M2 | HEART RATE: 72 BPM | HEIGHT: 73 IN | SYSTOLIC BLOOD PRESSURE: 116 MMHG

## 2022-06-22 DIAGNOSIS — Z12.11 ENCOUNTER FOR SCREENING COLONOSCOPY: Primary | ICD-10-CM

## 2022-06-22 LAB
COLONOSCOPY: NORMAL
UPPER GI ENDOSCOPY: NORMAL

## 2022-06-22 PROCEDURE — 88305 TISSUE EXAM BY PATHOLOGIST: CPT | Mod: 26 | Performed by: PATHOLOGY

## 2022-06-22 PROCEDURE — 370N000017 HC ANESTHESIA TECHNICAL FEE, PER MIN: Performed by: INTERNAL MEDICINE

## 2022-06-22 PROCEDURE — 250N000011 HC RX IP 250 OP 636: Performed by: REGISTERED NURSE

## 2022-06-22 PROCEDURE — 45380 COLONOSCOPY AND BIOPSY: CPT | Performed by: INTERNAL MEDICINE

## 2022-06-22 PROCEDURE — 250N000009 HC RX 250: Performed by: REGISTERED NURSE

## 2022-06-22 PROCEDURE — 88342 IMHCHEM/IMCYTCHM 1ST ANTB: CPT | Mod: 26 | Performed by: PATHOLOGY

## 2022-06-22 PROCEDURE — 258N000003 HC RX IP 258 OP 636: Performed by: REGISTERED NURSE

## 2022-06-22 PROCEDURE — 999N000010 HC STATISTIC ANES STAT CODE-CRNA PER MINUTE: Performed by: INTERNAL MEDICINE

## 2022-06-22 PROCEDURE — 88305 TISSUE EXAM BY PATHOLOGIST: CPT | Mod: TC | Performed by: INTERNAL MEDICINE

## 2022-06-22 PROCEDURE — 43239 EGD BIOPSY SINGLE/MULTIPLE: CPT | Performed by: INTERNAL MEDICINE

## 2022-06-22 RX ORDER — NALOXONE HYDROCHLORIDE 0.4 MG/ML
0.2 INJECTION, SOLUTION INTRAMUSCULAR; INTRAVENOUS; SUBCUTANEOUS
Status: DISCONTINUED | OUTPATIENT
Start: 2022-06-22 | End: 2022-06-22 | Stop reason: HOSPADM

## 2022-06-22 RX ORDER — FLUMAZENIL 0.1 MG/ML
0.2 INJECTION, SOLUTION INTRAVENOUS
Status: DISCONTINUED | OUTPATIENT
Start: 2022-06-22 | End: 2022-06-22 | Stop reason: HOSPADM

## 2022-06-22 RX ORDER — OMEPRAZOLE 40 MG/1
40 CAPSULE, DELAYED RELEASE ORAL DAILY
Qty: 30 CAPSULE | Refills: 0 | Status: SHIPPED | OUTPATIENT
Start: 2022-06-22 | End: 2023-05-24

## 2022-06-22 RX ORDER — ONDANSETRON 2 MG/ML
4 INJECTION INTRAMUSCULAR; INTRAVENOUS EVERY 6 HOURS PRN
Status: DISCONTINUED | OUTPATIENT
Start: 2022-06-22 | End: 2022-06-22 | Stop reason: HOSPADM

## 2022-06-22 RX ORDER — GUAIFENESIN AND DEXTROMETHORPHAN HYDROBROMIDE 600; 30 MG/1; MG/1
1 TABLET, EXTENDED RELEASE ORAL EVERY 12 HOURS
COMMUNITY
End: 2023-12-27

## 2022-06-22 RX ORDER — PROCHLORPERAZINE MALEATE 10 MG
10 TABLET ORAL EVERY 6 HOURS PRN
Status: DISCONTINUED | OUTPATIENT
Start: 2022-06-22 | End: 2022-06-22 | Stop reason: HOSPADM

## 2022-06-22 RX ORDER — ONDANSETRON 4 MG/1
4 TABLET, ORALLY DISINTEGRATING ORAL EVERY 6 HOURS PRN
Status: DISCONTINUED | OUTPATIENT
Start: 2022-06-22 | End: 2022-06-22 | Stop reason: HOSPADM

## 2022-06-22 RX ORDER — ONDANSETRON 2 MG/ML
4 INJECTION INTRAMUSCULAR; INTRAVENOUS
Status: DISCONTINUED | OUTPATIENT
Start: 2022-06-22 | End: 2022-06-22 | Stop reason: HOSPADM

## 2022-06-22 RX ORDER — ONDANSETRON 2 MG/ML
INJECTION INTRAMUSCULAR; INTRAVENOUS PRN
Status: DISCONTINUED | OUTPATIENT
Start: 2022-06-22 | End: 2022-06-22

## 2022-06-22 RX ORDER — LIDOCAINE 40 MG/G
CREAM TOPICAL
Status: DISCONTINUED | OUTPATIENT
Start: 2022-06-22 | End: 2022-06-22 | Stop reason: HOSPADM

## 2022-06-22 RX ORDER — LIDOCAINE HYDROCHLORIDE 20 MG/ML
SOLUTION OROPHARYNGEAL PRN
Status: DISCONTINUED | OUTPATIENT
Start: 2022-06-22 | End: 2022-06-22

## 2022-06-22 RX ORDER — PROPOFOL 10 MG/ML
INJECTION, EMULSION INTRAVENOUS PRN
Status: DISCONTINUED | OUTPATIENT
Start: 2022-06-22 | End: 2022-06-22

## 2022-06-22 RX ORDER — SODIUM CHLORIDE, SODIUM LACTATE, POTASSIUM CHLORIDE, CALCIUM CHLORIDE 600; 310; 30; 20 MG/100ML; MG/100ML; MG/100ML; MG/100ML
INJECTION, SOLUTION INTRAVENOUS CONTINUOUS PRN
Status: DISCONTINUED | OUTPATIENT
Start: 2022-06-22 | End: 2022-06-22

## 2022-06-22 RX ORDER — LIDOCAINE HYDROCHLORIDE 20 MG/ML
INJECTION, SOLUTION INFILTRATION; PERINEURAL PRN
Status: DISCONTINUED | OUTPATIENT
Start: 2022-06-22 | End: 2022-06-22

## 2022-06-22 RX ORDER — NALOXONE HYDROCHLORIDE 0.4 MG/ML
0.4 INJECTION, SOLUTION INTRAMUSCULAR; INTRAVENOUS; SUBCUTANEOUS
Status: DISCONTINUED | OUTPATIENT
Start: 2022-06-22 | End: 2022-06-22 | Stop reason: HOSPADM

## 2022-06-22 RX ORDER — DEXMEDETOMIDINE HYDROCHLORIDE 4 UG/ML
INJECTION, SOLUTION INTRAVENOUS PRN
Status: DISCONTINUED | OUTPATIENT
Start: 2022-06-22 | End: 2022-06-22

## 2022-06-22 RX ORDER — PROPOFOL 10 MG/ML
INJECTION, EMULSION INTRAVENOUS CONTINUOUS PRN
Status: DISCONTINUED | OUTPATIENT
Start: 2022-06-22 | End: 2022-06-22

## 2022-06-22 RX ORDER — GLYCOPYRROLATE 0.2 MG/ML
INJECTION, SOLUTION INTRAMUSCULAR; INTRAVENOUS PRN
Status: DISCONTINUED | OUTPATIENT
Start: 2022-06-22 | End: 2022-06-22

## 2022-06-22 RX ADMIN — PROPOFOL 20 MG: 10 INJECTION, EMULSION INTRAVENOUS at 13:14

## 2022-06-22 RX ADMIN — PROPOFOL 10 MG: 10 INJECTION, EMULSION INTRAVENOUS at 13:16

## 2022-06-22 RX ADMIN — ONDANSETRON 4 MG: 2 INJECTION INTRAMUSCULAR; INTRAVENOUS at 13:11

## 2022-06-22 RX ADMIN — LIDOCAINE HYDROCHLORIDE 5 ML: 20 SOLUTION ORAL; TOPICAL at 13:07

## 2022-06-22 RX ADMIN — SODIUM CHLORIDE, POTASSIUM CHLORIDE, SODIUM LACTATE AND CALCIUM CHLORIDE: 600; 310; 30; 20 INJECTION, SOLUTION INTRAVENOUS at 13:07

## 2022-06-22 RX ADMIN — PROPOFOL 20 MG: 10 INJECTION, EMULSION INTRAVENOUS at 13:13

## 2022-06-22 RX ADMIN — PROPOFOL 20 MG: 10 INJECTION, EMULSION INTRAVENOUS at 13:15

## 2022-06-22 RX ADMIN — GLYCOPYRROLATE 0.2 MG: 0.2 INJECTION, SOLUTION INTRAMUSCULAR; INTRAVENOUS at 13:10

## 2022-06-22 RX ADMIN — LIDOCAINE HYDROCHLORIDE 50 MG: 20 INJECTION, SOLUTION INFILTRATION; PERINEURAL at 13:12

## 2022-06-22 RX ADMIN — DEXMEDETOMIDINE 12 MCG: 100 INJECTION, SOLUTION, CONCENTRATE INTRAVENOUS at 13:11

## 2022-06-22 RX ADMIN — PROPOFOL 150 MCG/KG/MIN: 10 INJECTION, EMULSION INTRAVENOUS at 13:12

## 2022-06-22 ASSESSMENT — LIFESTYLE VARIABLES: TOBACCO_USE: 0

## 2022-06-22 ASSESSMENT — ENCOUNTER SYMPTOMS
SEIZURES: 0
DYSRHYTHMIAS: 1

## 2022-06-22 NOTE — ANESTHESIA CARE TRANSFER NOTE
Patient: Monty Fleming    Procedure: Procedure(s):  COLONOSCOPY, WITH POLYPECTOMY AND BIOPSY  ESOPHAGOGASTRODUODENOSCOPY, WITH BIOPSY       Diagnosis: Crohn's disease of both small and large intestine without complication (H) [K50.80]  Gastroesophageal reflux disease, unspecified whether esophagitis present [K21.9]  Diagnosis Additional Information: No value filed.    Anesthesia Type:   MAC     Note:    Oropharynx: oropharynx clear of all foreign objects and spontaneously breathing  Level of Consciousness: drowsy  Oxygen Supplementation: room air    Independent Airway: airway patency satisfactory and stable  Dentition: dentition unchanged  Vital Signs Stable: post-procedure vital signs reviewed and stable    Patient transferred to: Phase II    Handoff Report: Identifed the Patient, Identified the Reponsible Provider, Reviewed the pertinent medical history, Discussed the surgical course, Reviewed Intra-OP anesthesia mangement and issues during anesthesia, Set expectations for post-procedure period and Allowed opportunity for questions and acknowledgement of understanding      Vitals:  Vitals Value Taken Time   /90    Temp     Pulse 76 06/22/22 1407   Resp 12 06/22/22 1407   SpO2 96 % 06/22/22 1407   Vitals shown include unvalidated device data.    Electronically Signed By: MELITA Quintero CRNA  June 22, 2022  2:09 PM

## 2022-06-22 NOTE — ANESTHESIA POSTPROCEDURE EVALUATION
Patient: Monty Fleming    Procedure: Procedure(s):  COLONOSCOPY, WITH POLYPECTOMY AND BIOPSY  ESOPHAGOGASTRODUODENOSCOPY, WITH BIOPSY       Anesthesia Type:  MAC    Note:  Disposition: Outpatient   Postop Pain Control: Uneventful            Sign Out: Well controlled pain   PONV: No   Neuro/Psych: Uneventful            Sign Out: Acceptable/Baseline neuro status   Airway/Respiratory: Uneventful            Sign Out: Acceptable/Baseline resp. status   CV/Hemodynamics: Uneventful            Sign Out: Acceptable CV status   Other NRE: NONE   DID A NON-ROUTINE EVENT OCCUR? No           Last vitals:  Vitals Value Taken Time   /69 06/22/22 1440   Temp     Pulse 63 06/22/22 1444   Resp 44 06/22/22 1444   SpO2 97 % 06/22/22 1445   Vitals shown include unvalidated device data.    Electronically Signed By: Carl Avery MD  June 22, 2022  3:47 PM

## 2022-06-22 NOTE — H&P
"Monty Fleming  [id number]  male  46 year old      Reason for procedure/surgery: Crohn's Disease    There is no problem list on file for this patient.      Past Surgical History:    Past Surgical History:   Procedure Laterality Date     NISSEN FUNDOPLICATION N/A 2016    x 2       Past Medical History:   Past Medical History:   Diagnosis Date     Crohn's disease (H) 2018     Dysplastic nevus      ED (erectile dysfunction)      Fatty liver      Gastroesophageal reflux disease with esophagitis      Migraine      Plantar fasciitis      PONV (postoperative nausea and vomiting)      PVC's (premature ventricular contractions)      Seasonal allergic rhinitis      Sleep apnea        Social History:   Social History     Tobacco Use     Smoking status: Never Smoker     Smokeless tobacco: Never Used   Substance Use Topics     Alcohol use: Never       Family History:   Family History   Problem Relation Age of Onset     Diabetes Maternal Grandfather      Coronary Artery Disease Paternal Grandfather        Allergies: No Known Allergies    Active Medications:   No current outpatient medications on file.       Systemic Review:   CONSTITUTIONAL: NEGATIVE for fever, chills, change in weight  ENT/MOUTH: NEGATIVE for ear, mouth and throat problems  RESP: NEGATIVE for significant cough or SOB  CV: NEGATIVE for chest pain, palpitations or peripheral edema    Physical Examination:   Vital Signs: /77   Resp 16   Ht 1.854 m (6' 1\")   Wt 104.3 kg (230 lb)   SpO2 96%   BMI 30.34 kg/m    GENERAL: healthy, alert and no distress  NECK: no adenopathy, no asymmetry, masses, or scars  RESP: lungs clear to auscultation - no rales, rhonchi or wheezes  CV: regular rate and rhythm, normal S1 S2, no S3 or S4, no murmur, click or rub, no peripheral edema and peripheral pulses strong  ABDOMEN: soft, nontender, no hepatosplenomegaly, no masses and bowel sounds normal  MS: no gross musculoskeletal defects noted, no edema    Plan: Appropriate to " proceed as scheduled.      Ariel Drew MD  6/22/2022    PCP:  Jaron Vines

## 2022-06-22 NOTE — INTERVAL H&P NOTE
"I have reviewed the surgical (or preoperative) H&P that is linked to this encounter, and examined the patient. There are no significant changes    Clinical Conditions Present on Arrival:  Clinically Significant Risk Factors Present on Admission                   # Obesity: Estimated body mass index is 30.34 kg/m  as calculated from the following:    Height as of this encounter: 1.854 m (6' 1\").    Weight as of this encounter: 104.3 kg (230 lb).       "

## 2022-06-22 NOTE — ANESTHESIA PREPROCEDURE EVALUATION
Anesthesia Pre-Procedure Evaluation    Patient: Monty Fleming   MRN: [id number] : 1975        Procedure : Procedure(s):  COLONOSCOPY  ESOPHAGOGASTRODUODENOSCOPY (EGD)          Past Medical History:   Diagnosis Date     Crohn's disease (H) 2018     Dysplastic nevus      ED (erectile dysfunction)      Fatty liver      Gastroesophageal reflux disease with esophagitis      Migraine      Plantar fasciitis      PONV (postoperative nausea and vomiting)      PVC's (premature ventricular contractions)      Seasonal allergic rhinitis      Sleep apnea       Past Surgical History:   Procedure Laterality Date     NISSEN FUNDOPLICATION N/A 2016    x 2      No Known Allergies   Social History     Tobacco Use     Smoking status: Never Smoker     Smokeless tobacco: Never Used   Substance Use Topics     Alcohol use: Never      Wt Readings from Last 1 Encounters:   22 104.3 kg (230 lb)        Anesthesia Evaluation   Pt has had prior anesthetic.     History of anesthetic complications  - PONV.      ROS/MED HX  ENT/Pulmonary:     (+) sleep apnea,  (-) tobacco use   Neurologic:     (+) migraines,  (-) no seizures and no CVA   Cardiovascular:     (+) -----dysrhythmias, PVCs,  (-) hypertension   METS/Exercise Tolerance:     Hematologic:       Musculoskeletal:       GI/Hepatic:     (+) GERD, Inflammatory bowel disease, liver disease,     Renal/Genitourinary:    (-) renal disease   Endo:     (+) Obesity,  (-) Type II DM and thyroid disease   Psychiatric/Substance Use:       Infectious Disease:       Malignancy:       Other:            Physical Exam    Airway        Mallampati: II   TM distance: > 3 FB   Neck ROM: full   Mouth opening: > 3 cm    Respiratory Devices and Support         Dental  no notable dental history         Cardiovascular   cardiovascular exam normal          Pulmonary   pulmonary exam normal                OUTSIDE LABS:  CBC:   Lab Results   Component Value Date    WBC 7.3 2021    HGB 15.8 2021     HCT 47.3 04/22/2021     04/22/2021     BMP:   Lab Results   Component Value Date     04/22/2021    POTASSIUM 4.1 04/22/2021    POTASSIUM 4.4 08/14/2020    CHLORIDE 103 02/11/2022    CHLORIDE 108 04/22/2021    CO2 30 04/22/2021    BUN 8 04/22/2021    CR 0.79 04/22/2021    CR 0.90 08/14/2020    GLC 75 04/22/2021    GLC 94 08/14/2020     COAGS:   Lab Results   Component Value Date    INR 0.9 02/21/2020     POC: No results found for: BGM, HCG, HCGS  HEPATIC:   Lab Results   Component Value Date    ALBUMIN 4.1 04/22/2021    PROTTOTAL 7.6 04/22/2021    ALT 76 (H) 04/22/2021    AST 40 04/22/2021    ALKPHOS 102 04/22/2021    BILITOTAL 0.6 04/22/2021     OTHER:   Lab Results   Component Value Date    MISTY 8.9 04/22/2021    TSH 1.91 04/22/2021    CRP <2.9 04/22/2021       Anesthesia Plan    ASA Status:  3   NPO Status:  NPO Appropriate    Anesthesia Type: MAC.     - Reason for MAC: straight local not clinically adequate              Consents    Anesthesia Plan(s) and associated risks, benefits, and realistic alternatives discussed. Questions answered and patient/representative(s) expressed understanding.    - Discussed:     - Discussed with:  Patient         Postoperative Care    Pain management: Multi-modal analgesia.   PONV prophylaxis: Ondansetron (or other 5HT-3)     Comments:                Saleem Jo MD

## 2022-06-24 LAB
PATH REPORT.ADDENDUM SPEC: NORMAL
PATH REPORT.COMMENTS IMP SPEC: NORMAL
PATH REPORT.FINAL DX SPEC: NORMAL
PATH REPORT.GROSS SPEC: NORMAL
PATH REPORT.MICROSCOPIC SPEC OTHER STN: NORMAL
PATH REPORT.RELEVANT HX SPEC: NORMAL
PHOTO IMAGE: NORMAL

## 2022-09-25 ENCOUNTER — HEALTH MAINTENANCE LETTER (OUTPATIENT)
Age: 47
End: 2022-09-25

## 2022-10-24 ENCOUNTER — MYC MEDICAL ADVICE (OUTPATIENT)
Dept: GASTROENTEROLOGY | Facility: CLINIC | Age: 47
End: 2022-10-24

## 2022-10-24 DIAGNOSIS — K50.80 CROHN'S DISEASE OF BOTH SMALL AND LARGE INTESTINE WITHOUT COMPLICATION (H): ICD-10-CM

## 2022-10-25 NOTE — TELEPHONE ENCOUNTER
Adalimumab (Humira CF Pen) 40 mg/0.4ml pen kit  Inject 40mg (0.4ml) under the skin every 7 days    Last Written Prescription Date:  [id number]  Last Fill Quantity: 4 pen,  # refills: 11  Last office visit:  5/4/2022  Future Office Visit:  11/9/2022    Routing refill request to provider for review/approval because:  Drug not on the [facility] refill protocol     Ruby Nesbitt RN

## 2022-10-27 ENCOUNTER — TELEPHONE (OUTPATIENT)
Dept: GASTROENTEROLOGY | Facility: CLINIC | Age: 47
End: 2022-10-27

## 2022-10-27 RX ORDER — ADALIMUMAB 40MG/0.4ML
40 KIT SUBCUTANEOUS
Qty: 4 EACH | Refills: 11 | Status: SHIPPED | OUTPATIENT
Start: 2022-10-27 | End: 2023-07-10

## 2022-10-27 NOTE — TELEPHONE ENCOUNTER
PA Initiation    Medication: Humira - PA Pending (Renewal)  Insurance Company: [employer] ([employer]) - Phone [phone] Fax [phone]  Pharmacy Filling the Rx: [facility] [place] - [address] [place] [address]  Filling Pharmacy Phone:    Filling Pharmacy Fax:    Start Date: 10/27/2022

## 2022-10-28 NOTE — TELEPHONE ENCOUNTER
Prior Authorization Approval    Authorization Effective Date: 9/27/2022  Authorization Expiration Date: 10/27/2023  Medication: Humira - PA Approved (Renewal)  Approved Dose/Quantity: UD  Reference #: Key: [id number]   Insurance Company: [employer] ([employer]) - Phone [phone] Fax [phone]  Expected CoPay:       CoPay Card Available:      Foundation Assistance Needed:    Which Pharmacy is filling the prescription (Not needed for infusion/clinic administered): [place], [place] - [address] [place] [address]  Pharmacy Notified: Yes  Patient Notified: Yes

## 2022-11-09 ENCOUNTER — VIRTUAL VISIT (OUTPATIENT)
Dept: GASTROENTEROLOGY | Facility: CLINIC | Age: 47
End: 2022-11-09
Payer: COMMERCIAL

## 2022-11-09 DIAGNOSIS — K21.9 GASTROESOPHAGEAL REFLUX DISEASE, UNSPECIFIED WHETHER ESOPHAGITIS PRESENT: ICD-10-CM

## 2022-11-09 DIAGNOSIS — M25.50 MULTIPLE JOINT PAIN: ICD-10-CM

## 2022-11-09 DIAGNOSIS — K50.80 CROHN'S DISEASE OF BOTH SMALL AND LARGE INTESTINE WITHOUT COMPLICATION (H): Primary | ICD-10-CM

## 2022-11-09 DIAGNOSIS — R10.12 LUQ ABDOMINAL PAIN: ICD-10-CM

## 2022-11-09 PROCEDURE — 99214 OFFICE O/P EST MOD 30 MIN: CPT | Mod: 95 | Performed by: INTERNAL MEDICINE

## 2022-11-09 RX ORDER — FAMOTIDINE 40 MG/1
40 TABLET, FILM COATED ORAL
Qty: 30 TABLET | Refills: 3 | Status: SHIPPED | OUTPATIENT
Start: 2022-11-09 | End: 2022-11-09

## 2022-11-09 RX ORDER — FAMOTIDINE 40 MG/1
40 TABLET, FILM COATED ORAL
Qty: 30 TABLET | Refills: 11 | Status: SHIPPED | OUTPATIENT
Start: 2022-11-09 | End: 2023-10-26

## 2022-11-09 NOTE — PROGRESS NOTES
Monty is a 46 year old who is being evaluated via a billable video visit.      How would you like to obtain your AVS? [employer]  If the video visit is dropped, the invitation should be resent by: Text to cell phone: [phone]  Will anyone else be joining your video visit? No

## 2022-11-09 NOTE — PATIENT INSTRUCTIONS
Please see a rheumatologist.   Please have  a CT abdomen done.  Please submit a stool sample for a fecal calprotectin  I have also ordered your labs - you can wait until you see your PCP to have these done in case they want to add anything else

## 2022-11-09 NOTE — PROGRESS NOTES
HPI:    Monty  presents today for a video visit to discuss for follow-up of crohn's ileocolitis. Overall doing okay.  Stools are alternating between constipation and diarrhea.  Will go a few days being more constipated followed by days of looser stools. No blood in the stool.    Still with episodic LUQ pain - happens with twisting or certain movements - more of a discomfort than a pain - feels like the area is apodaca when symptoms occur but no actual bulge to the area that he has been able to identify.  Did try topical therapies without any relief although symptoms when they occur tend to be transient. Did do a trial of PPI for 2 months - noticed no improvement in symptoms. EGD largely unremarkable.  Joint pains in the fingers/palms. Also some in back and hips - comes and goes randomly. Had a rash on the back of right knee a few weeks ago that has now resolved.    IBD History:  Time of diagnosis: 2018  Extent of disease: ileocolonic  Phenotype: stricturing  Previous therapies: [facility]ira at [id number] - now receiving weekly  EIM: joint pains  Previous surgeries: none for crohns, did have nissen in the past  Most recent endoscopy: 2022  Perianal skin tags found on perianal exam.                             - The examined portion of the ileum was normal.                             - Scarring with distortion of the IC valve and the                             cecum was found. The IC valve was wide open and                             passed easily with the pediatric colonoscope. There                             was some granularity and small pseudopolyps at the                             IC valve but no active inflammation. . Biopsied.                             - A single ulcer in the transverse colon. Biopsied.                             - One 2 mm polyp in the ascending colon, removed                             with a cold biopsy forceps. Resected and retrieved.                             - One 3 mm polyp in  the sigmoid colon, removed with                             a cold biopsy forceps. Resected and retrieved.                             - Diverticulosis in the sigmoid colon.                             - Simple Endoscopic Score for Crohn's Disease: 3,                             mucosal inflammatory changes secondary to Crohn's                             disease. Biopsied.                             Overall there is minimal evidence of any Crohn's                             Disease activity in the colon. One small 4 mm                             ulcer. Otherwise no activity.   Impression:               - Normal esophagus.                             - Z-line regular, 43 cm from the incisors.                             - A Nissen fundoplication was found. The wrap                             appears intact.                             - A patch of friable gastric mucosa in the distal                             gastric body. Biopsied.                             - Normal examined duodenum. Biopsied.                             Overall unremarkable exam. Mild friability in the                             gastric body was biopsied. Can consider trial of                             omeprazole 40 mg daily for 2 months to see if this                             offers any benefit to chronic epigastric/LUQ pain.     A.  Duodenum: Biopsy:  - Duodenal mucosa within normal limits  - Normal villous architecture with no increase in intraepithelial lymphocytes      B.  Stomach: Biopsy:  - Oxyntic type mucosa with mucosal erosion and reactive epithelial changes  - Antral type mucosa within normal limits  - Immunostain for Helicobacter pylori is in process, with the results to be reported in an addendum      C.  Ileocecal valve: Biopsy:  - Chronic, focally active colitis with architectural disarray, basal plasmacytosis, and focal active cryptitis  - No dysplasia     D.  Colon, ascending: Polypectomy:  - Tubular adenoma  - No  evidence of high-grade dysplasia or invasive malignancy      E.  Colon, cecum/ascending: Biopsy:  - Colonic mucosa within normal limits  - No active or chronic colitis  - No dysplasia      F.  Colon, transverse: Biopsy:  - Colonic mucosa within normal limits  - No active or chronic colitis  - No dysplasia      G.  Colon, transverse, ulcer: Biopsy:  - Colonic mucosa with lamina propria hyalinization, withered crypts, and focal active inflammation, see comment     H.  Colon, descending/sigmoid: Biopsy:  - Colonic mucosa within normal limits  - No active or chronic colitis  - No dysplasia       I.  Colon, sigmoid: Polypectomy:  - Tubular adenoma  - No evidence of high-grade dysplasia or invasive malignancy       J.  Rectum: Biopsy:  - Colonic mucosa within normal limits  - No active or chronic colitis  - No dysplasia        Past Medical History:   Diagnosis Date     Crohn's disease (H) 2018     Dysplastic nevus      ED (erectile dysfunction)      Fatty liver      Gastroesophageal reflux disease with esophagitis      Migraine      Plantar fasciitis      PONV (postoperative nausea and vomiting)      PVC's (premature ventricular contractions)      Seasonal allergic rhinitis      Sleep apnea        Past Surgical History:   Procedure Laterality Date     COLONOSCOPY N/A 6/22/2022    Procedure: COLONOSCOPY, WITH POLYPECTOMY AND BIOPSY;  Surgeon: Ariel Drew MD;  Location: [facility]     ESOPHAGOSCOPY, GASTROSCOPY, DUODENOSCOPY (EGD), COMBINED N/A 6/22/2022    Procedure: ESOPHAGOGASTRODUODENOSCOPY, WITH BIOPSY;  Surgeon: Ariel Drew MD;  Location: [facility]     NISSEN FUNDOPLICATION N/A 2016    x 2       Family History   Problem Relation Age of Onset     Diabetes Maternal Grandfather      Coronary Artery Disease Paternal Grandfather        Social History     Tobacco Use     Smoking status: Never     Smokeless tobacco: Never   Substance Use Topics     Alcohol use: Never        O:    Gen: no acute  distress  HEENT: NCAT  Neck: normal ROM  Resp: nonlabored breathing  Neuro: no gross deficits  Psych: appropriate mood and affect    Assessment and Plan:    # crohns ileocolitis - relatively well controlled, very mild active inflammation on most recent colonoscopy - will get fecal calprotectin. Also due for repeat labs - ordered today    # periodic LUQ pain  -EGD with mild gastritis - no improvement with PPI.  Still does seem to have MSK component but will get CT for further evaluation    # joint pain, rashes - will refer to rhuematology     Vaccinations:  -- Influenza (every year): encourage to receive when available  -- TdaP (every 10 years): received last year  -- Pneumococcal Pneumonia (once then every 5 years): Last given will address at next visit - received prevnar in 2019 - will discuss pneumovax  -- Yearly assessment for latent Tb (verbal screening and exam, PPD or QuantiFERON-Tb testing): Last obtained 2018 negative  - Covid vaccine - received 2021     One time confirmation of immunity or serologies:  -- Hepatitis A (serologies or immunizations): will discuss vaccine at next visit  -- Hepatitis B (serologies or immunizations): negative in 2018 - will discuss vaccine at next visit  -- MMR: will address at next visit  -- HPV (all aged 18-26): n/a  -- Meningococcal meningitis (all patients at risk for meningitis): N/a  -- Due to the immunosuppression in this patient, I would not advise administration of live vaccines such as varicella/VZV, intranasal influenza, MMR, or yellow fever vaccine (if travelling).       Bone mineral density screening   -- Recommend all patients supplement with calcium and vitamin D  -- Given prior steroid use recommend DEXA if not already done     Cancer Screening:  Colon cancer screening:     Skin cancer screening: Annual visual exam of skin by dermatologist since patient is immunocompromised - discussed with patient today given his history - referral placed to  dermatology     Depression Screening:       Misc:  -- Avoid tobacco use  -- Avoid NSAIDs as there is potentially a 25% chance of causing an IBD flare     RTC 6 months  Ruby Beck, DO     Video-Visit Details     Type of service:  Video Visit     Video Start Time: 2:34 PM  Video End Time (time video stopped): 2:49    Originating Location (pt. Location): home     Distant Location (provider location):  [facility]      Mode of Communication:  Video Conference via [employer]

## 2022-11-14 ENCOUNTER — TELEPHONE (OUTPATIENT)
Dept: GASTROENTEROLOGY | Facility: CLINIC | Age: 47
End: 2022-11-14

## 2022-11-14 NOTE — TELEPHONE ENCOUNTER
Left Voicemail (1st Attempt) for the patient to call back and schedule the following:    Appointment type: return   Provider: dr. ponce   Return date: 5/9/2023  Specialty phone number: [phone]  Additonal Notes: Return in about 6 months (around 5/9/2023    Krupa antunez Procedure   Orthopedics, Podiatry, Sports Medicine, Ent ,Eye , Audiology, Adult Endocrine & Diabetes, Nutrition & Medication Therapy Management Specialties   [facility]

## 2022-11-18 ENCOUNTER — ANCILLARY PROCEDURE (OUTPATIENT)
Dept: CT IMAGING | Facility: CLINIC | Age: 47
End: 2022-11-18
Attending: INTERNAL MEDICINE
Payer: COMMERCIAL

## 2022-11-18 DIAGNOSIS — K21.9 GASTROESOPHAGEAL REFLUX DISEASE, UNSPECIFIED WHETHER ESOPHAGITIS PRESENT: ICD-10-CM

## 2022-11-18 DIAGNOSIS — M25.50 MULTIPLE JOINT PAIN: ICD-10-CM

## 2022-11-18 DIAGNOSIS — R10.12 LUQ ABDOMINAL PAIN: ICD-10-CM

## 2022-11-18 DIAGNOSIS — K50.80 CROHN'S DISEASE OF BOTH SMALL AND LARGE INTESTINE WITHOUT COMPLICATION (H): ICD-10-CM

## 2022-11-18 PROCEDURE — 74177 CT ABD & PELVIS W/CONTRAST: CPT | Performed by: RADIOLOGY

## 2022-11-18 RX ORDER — IOPAMIDOL 755 MG/ML
135 INJECTION, SOLUTION INTRAVASCULAR ONCE
Status: COMPLETED | OUTPATIENT
Start: 2022-11-18 | End: 2022-11-18

## 2022-11-18 RX ADMIN — IOPAMIDOL 135 ML: 755 INJECTION, SOLUTION INTRAVASCULAR at 12:37

## 2022-11-28 ENCOUNTER — MYC MEDICAL ADVICE (OUTPATIENT)
Dept: GASTROENTEROLOGY | Facility: CLINIC | Age: 47
End: 2022-11-28

## 2022-11-28 NOTE — TELEPHONE ENCOUNTER
Faxed lab orders to [facility] lab per patient request.     Sent [employer] message stating that this was completed.     Ruby Nesbitt RN

## 2022-11-30 ENCOUNTER — TELEPHONE (OUTPATIENT)
Dept: GASTROENTEROLOGY | Facility: CLINIC | Age: 47
End: 2022-11-30

## 2022-11-30 NOTE — TELEPHONE ENCOUNTER
Junior from  [facility] called regarding lab orders to be faxed.   Writer faxed orders to [phone] per Junior's request.     Confirmation of successful fax received via [employer] at 1:42PM.     Ruby Nesbitt RN

## 2023-04-04 ENCOUNTER — MYC MEDICAL ADVICE (OUTPATIENT)
Dept: GASTROENTEROLOGY | Facility: CLINIC | Age: 48
End: 2023-04-04
Payer: COMMERCIAL

## 2023-04-04 NOTE — TELEPHONE ENCOUNTER
Replied to [employer]ola [employer] message that message will be forwarded to provider for review.    Melani Avery RN

## 2023-04-06 DIAGNOSIS — K50.80 CROHN'S DISEASE OF BOTH SMALL AND LARGE INTESTINE WITHOUT COMPLICATION (H): Primary | ICD-10-CM

## 2023-04-06 NOTE — TELEPHONE ENCOUNTER
Ruby Beck DO Heckt, NASIR Polo  Phone Number: [phone]     I put in blood work and stool studies   Thanks     [employer] messages sent to provider with the above information.    Thanks,  Melani Avery, RN

## 2023-04-07 ENCOUNTER — LAB (OUTPATIENT)
Dept: LAB | Facility: CLINIC | Age: 48
End: 2023-04-07
Payer: COMMERCIAL

## 2023-04-07 DIAGNOSIS — K50.80 CROHN'S DISEASE OF BOTH SMALL AND LARGE INTESTINE WITHOUT COMPLICATION (H): ICD-10-CM

## 2023-04-07 LAB
ALBUMIN SERPL BCG-MCNC: 4.4 G/DL (ref 3.5–5.2)
ALP SERPL-CCNC: 84 U/L (ref 40–129)
ALT SERPL W P-5'-P-CCNC: 54 U/L (ref 10–50)
ANION GAP SERPL CALCULATED.3IONS-SCNC: 13 MMOL/L (ref 7–15)
AST SERPL W P-5'-P-CCNC: 40 U/L (ref 10–50)
BILIRUB SERPL-MCNC: 0.5 MG/DL
BUN SERPL-MCNC: 8.6 MG/DL (ref 6–20)
CALCIUM SERPL-MCNC: 9.3 MG/DL (ref 8.6–10)
CHLORIDE SERPL-SCNC: 105 MMOL/L (ref 98–107)
CREAT SERPL-MCNC: 0.81 MG/DL (ref 0.67–1.17)
CRP SERPL-MCNC: <3 MG/L
DEPRECATED HCO3 PLAS-SCNC: 24 MMOL/L (ref 22–29)
ERYTHROCYTE [DISTWIDTH] IN BLOOD BY AUTOMATED COUNT: 13.2 % (ref 10–15)
ERYTHROCYTE [SEDIMENTATION RATE] IN BLOOD BY WESTERGREN METHOD: 8 MM/HR (ref 0–15)
GFR SERPL CREATININE-BSD FRML MDRD: >90 ML/MIN/1.73M2
GLUCOSE SERPL-MCNC: 139 MG/DL (ref 70–99)
HCT VFR BLD AUTO: 44.3 % (ref 40–53)
HGB BLD-MCNC: 15.2 G/DL (ref 13.3–17.7)
MCH RBC QN AUTO: 31.8 PG (ref 26.5–33)
MCHC RBC AUTO-ENTMCNC: 34.3 G/DL (ref 31.5–36.5)
MCV RBC AUTO: 93 FL (ref 78–100)
PLATELET # BLD AUTO: 217 10E3/UL (ref 150–450)
POTASSIUM SERPL-SCNC: 4.3 MMOL/L (ref 3.4–5.3)
PROT SERPL-MCNC: 7 G/DL (ref 6.4–8.3)
RBC # BLD AUTO: 4.78 10E6/UL (ref 4.4–5.9)
SODIUM SERPL-SCNC: 142 MMOL/L (ref 136–145)
WBC # BLD AUTO: 5.8 10E3/UL (ref 4–11)

## 2023-04-07 PROCEDURE — 85652 RBC SED RATE AUTOMATED: CPT

## 2023-04-07 PROCEDURE — 85027 COMPLETE CBC AUTOMATED: CPT

## 2023-04-07 PROCEDURE — 86140 C-REACTIVE PROTEIN: CPT

## 2023-04-07 PROCEDURE — 80053 COMPREHEN METABOLIC PANEL: CPT

## 2023-04-07 PROCEDURE — 36415 COLL VENOUS BLD VENIPUNCTURE: CPT

## 2023-04-08 ENCOUNTER — APPOINTMENT (OUTPATIENT)
Dept: LAB | Facility: CLINIC | Age: 48
End: 2023-04-08
Payer: COMMERCIAL

## 2023-04-08 LAB — C DIFF TOX B STL QL: NEGATIVE

## 2023-04-08 PROCEDURE — 87493 C DIFF AMPLIFIED PROBE: CPT | Mod: 59

## 2023-04-08 PROCEDURE — 83993 ASSAY FOR CALPROTECTIN FECAL: CPT

## 2023-04-08 PROCEDURE — 87506 IADNA-DNA/RNA PROBE TQ 6-11: CPT

## 2023-04-10 DIAGNOSIS — K50.80 CROHN'S DISEASE OF BOTH SMALL AND LARGE INTESTINE WITHOUT COMPLICATION (H): ICD-10-CM

## 2023-04-10 DIAGNOSIS — K62.5 BRBPR (BRIGHT RED BLOOD PER RECTUM): Primary | ICD-10-CM

## 2023-04-10 LAB — CALPROTECTIN STL-MCNT: 22 MG/KG (ref 0–49.9)

## 2023-04-10 RX ORDER — HYDROCORTISONE ACETATE 25 MG/1
25 SUPPOSITORY RECTAL 2 TIMES DAILY PRN
Qty: 24 SUPPOSITORY | Refills: 3 | Status: SHIPPED | OUTPATIENT
Start: 2023-04-10 | End: 2024-06-11

## 2023-05-04 ENCOUNTER — OFFICE VISIT (OUTPATIENT)
Dept: RHEUMATOLOGY | Facility: CLINIC | Age: 48
End: 2023-05-04
Payer: COMMERCIAL

## 2023-05-04 ENCOUNTER — ANCILLARY PROCEDURE (OUTPATIENT)
Dept: GENERAL RADIOLOGY | Facility: CLINIC | Age: 48
End: 2023-05-04
Attending: NURSE PRACTITIONER
Payer: COMMERCIAL

## 2023-05-04 VITALS
SYSTOLIC BLOOD PRESSURE: 124 MMHG | BODY MASS INDEX: 31.24 KG/M2 | DIASTOLIC BLOOD PRESSURE: 71 MMHG | WEIGHT: 236.8 LBS | TEMPERATURE: 98 F | OXYGEN SATURATION: 99 % | HEART RATE: 66 BPM

## 2023-05-04 DIAGNOSIS — M25.50 MULTIPLE JOINT PAIN: ICD-10-CM

## 2023-05-04 DIAGNOSIS — K50.80 CROHN'S DISEASE OF BOTH SMALL AND LARGE INTESTINE WITHOUT COMPLICATION (H): ICD-10-CM

## 2023-05-04 DIAGNOSIS — R10.12 LUQ ABDOMINAL PAIN: ICD-10-CM

## 2023-05-04 DIAGNOSIS — K21.9 GASTROESOPHAGEAL REFLUX DISEASE, UNSPECIFIED WHETHER ESOPHAGITIS PRESENT: ICD-10-CM

## 2023-05-04 DIAGNOSIS — M54.89 INFLAMMATORY BACK PAIN: Primary | ICD-10-CM

## 2023-05-04 PROBLEM — R73.9 HYPERGLYCEMIA: Status: ACTIVE | Noted: 2019-06-02

## 2023-05-04 PROBLEM — M75.52 CHRONIC BURSITIS OF LEFT SHOULDER: Status: ACTIVE | Noted: 2018-09-26

## 2023-05-04 PROBLEM — R53.83 FATIGUE: Status: ACTIVE | Noted: 2017-03-20

## 2023-05-04 PROBLEM — Z87.01 HISTORY OF PNEUMONIA: Status: ACTIVE | Noted: 2023-05-04

## 2023-05-04 PROBLEM — F51.12 INSUFFICIENT SLEEP SYNDROME: Status: ACTIVE | Noted: 2021-10-01

## 2023-05-04 PROBLEM — K50.119 CROHN'S DISEASE OF COLON WITH COMPLICATION (H): Status: ACTIVE | Noted: 2019-01-04

## 2023-05-04 PROBLEM — Z87.19 HISTORY OF IRRITABLE BOWEL SYNDROME: Status: ACTIVE | Noted: 2023-05-04

## 2023-05-04 PROBLEM — J30.9 ALLERGIC RHINITIS: Status: ACTIVE | Noted: 2017-09-30

## 2023-05-04 LAB — HBA1C MFR BLD: 5.6 % (ref 0–5.6)

## 2023-05-04 PROCEDURE — 82306 VITAMIN D 25 HYDROXY: CPT | Performed by: NURSE PRACTITIONER

## 2023-05-04 PROCEDURE — 36415 COLL VENOUS BLD VENIPUNCTURE: CPT | Performed by: NURSE PRACTITIONER

## 2023-05-04 PROCEDURE — 80145 DRUG ASSAY ADALIMUMAB: CPT | Mod: 90 | Performed by: NURSE PRACTITIONER

## 2023-05-04 PROCEDURE — 82397 CHEMILUMINESCENT ASSAY: CPT | Mod: 90 | Performed by: NURSE PRACTITIONER

## 2023-05-04 PROCEDURE — 73130 X-RAY EXAM OF HAND: CPT | Mod: RT | Performed by: STUDENT IN AN ORGANIZED HEALTH CARE EDUCATION/TRAINING PROGRAM

## 2023-05-04 PROCEDURE — 83036 HEMOGLOBIN GLYCOSYLATED A1C: CPT | Performed by: NURSE PRACTITIONER

## 2023-05-04 PROCEDURE — 99000 SPECIMEN HANDLING OFFICE-LAB: CPT | Performed by: NURSE PRACTITIONER

## 2023-05-04 PROCEDURE — 99204 OFFICE O/P NEW MOD 45 MIN: CPT | Performed by: NURSE PRACTITIONER

## 2023-05-04 RX ORDER — METOPROLOL SUCCINATE 100 MG/1
1 TABLET, EXTENDED RELEASE ORAL DAILY
COMMUNITY
Start: 2023-03-21 | End: 2024-06-11

## 2023-05-04 ASSESSMENT — PAIN SCALES - GENERAL: PAINLEVEL: MILD PAIN (2)

## 2023-05-04 NOTE — NURSING NOTE
"Monty Fleming's goals for this visit include:   Chief Complaint   Patient presents with     Consult     LUQ abdominal pain  Multiple joint pain  Crohn's disease of both small and large intestine without complication         New Patient     Referred by: Ruby Beck DO       PCP: Jaron Vines    Referring Provider:  Ruby Beck [place]  [address] [place] [address]      Initial /71 (BP Location: Left arm, Patient Position: Sitting, Cuff Size: Adult Large)   Pulse 66   Temp 98  F (36.7  C) (Oral)   Wt 107.4 kg (236 lb 12.8 oz)   SpO2 99%   BMI 31.24 kg/m   Estimated body mass index is 31.24 kg/m  as calculated from the following:    Height as of 6/22/22: 1.854 m (6' 1\").    Weight as of this encounter: 107.4 kg (236 lb 12.8 oz).    Medication Reconciliation: complete    Do you need any medication refills at today's visit? none    CATALINO Garcia  Rheumatology/Infectious disease  [facility]   [phone]    "

## 2023-05-04 NOTE — PROGRESS NOTES
Rheumatology Clinic Visit  Lisseth Herrera, EDILIA      Monty Fleming  YOB: 1975    Age: 47 year old   MRN# [id number]       Date of Visit: 05/04/2023  Primary care provider: Jaron Vines              Subjective:     Monty is a 47 year old male presents today for consult for joint pain in setting of chron's ds (dx 2019). Referred by Dr. Ruby Beck. Current treatment: Humira 40 mg weekly (2019)       Consult 5/4/23: Joint pain floats around to different sites at different times, has had multiple episodes of bursitis and tendontitis and PT helped the shoulders however now is having issues with hand pain.   Can no longer wear ring.   R hip, can't sit cross legged, doesn't move that way.   Lower back, periods of stiffness, if sneezes pain radiates down to grown. This episodes last a week at a time.  Knees bother from time to time.   Denies joint swelling.   Stiff in am daily for 5 min.   Most mornings SI/ lower back stiff. Also has gel.       ROS: Patient denies recent infections, fevers, rashes, SOB.      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        FMH:  No AI  No melanoma   No MS    Active Problem list:  Patient Active Problem List    Diagnosis Date Noted     GERD (gastroesophageal reflux disease) 05/04/2023     Priority: Medium     History of pneumonia 05/04/2023     Priority: Medium     History of irritable bowel syndrome 05/04/2023     Priority: Medium     Insufficient sleep syndrome 10/01/2021     Priority: Medium     Hyperglycemia 06/02/2019     Priority: Medium     Crohn's disease of colon with complication (H) 01/04/2019     Priority: Medium     Chronic bursitis of left shoulder 09/26/2018     Priority: Medium     Allergic rhinitis 09/30/2017     Priority: Medium     Fatigue  03/20/2017     Priority: Medium     Dysphagia, unspecified 10/13/2015     Priority: Medium     ED (erectile dysfunction) 09/19/2015     Priority: Medium     Hypogonadism male 09/19/2015     Priority: Medium     CAROLE on CPAP 09/19/2015     Priority: Medium     Swallowing dysfunction 09/19/2015     Priority: Medium     Ventricular arrhythmia 09/19/2015     Priority: Medium     Vitamin D deficiency 09/19/2015     Priority: Medium     Bursitis, infrapatellar or subpatellar 08/14/2015     Priority: Medium     Tendonitis of ankle, left 08/14/2015     Priority: Medium     Cough, persistent 05/18/2015     Priority: Medium     Hearing loss on left 05/18/2015     Priority: Medium     Abnormal MRI of head 12/31/2014     Priority: Medium     Migraine headache 12/31/2014     Priority: Medium     Peripheral vestibulopathy 03/14/2014     Priority: Medium     Formatting of this note might be different from the original.  Left ear, symptoms resolved with vestibular rehab              Objective:     Physical Exam  Blood Pressure 124/71 (BP Location: Left arm, Patient Position: Sitting, Cuff Size: Adult Large)   Pulse 66   Temperature 98  F (36.7  C) (Oral)   Weight 107.4 kg (236 lb 12.8 oz)   Oxygen Saturation 99%   Body Mass Index 31.24 kg/m    Body mass index is 31.24 kg/m .    Constitutional: Normal body habitus with out gross deformities. Well groomed.   Psych: nl judgement, orientation, memory, affect.  Eyes: wnl EOM, PERRLA, vision, conjunctiva, sclera   ENT: wnl external ears, nose, hearing, lips, teeth, gums, throat. No mucous membrane lesions, normal saliva pool  Neck: no mass, thyroid enlargement, enlarged cervical lymph nodes or parotid glands  Resp: lungs clear to auscultation, nl work of breathing  CV: RRR, no murmurs, rubs or gallops, no edema    Skin: no nail pitting, alopecia, rash, nodules or lesions of exposed areas of face, neck, bilateral upper and lower extremity   Neuro: Strength WNL of bilateral upper and  lower extremity large muscle groups.     MSK- (T=tender to palpation, S=swelling)  TMJ: -T/S, FROM   Cervical spine:  FROM  Shoulders: -T/S, FROM   Elbows: -T/S, FROM   Wrists: -T/S, FROM   Hands: -T/S, FROM, Normal  strength. No dactylitis. Subtle bony prominence of CMC's   Hips: FROM  Knees: -T/S, FROM   Ankles: -T/S, FROM No enthesopathy or tenosynovitis.   Feet: -T/S, FROM . No dactylitis. Pes planus.   Finger tip to floor limited by tight hamstrings     Labs:    Lab Results   Component Value Date    ALT 54 (H) 04/07/2023    AST 40 04/07/2023    CR 0.81 04/07/2023    CRP <2.9 04/22/2021       Imaging:           Assessment and Plan:     #1) Polyarthralgia's in setting of crohn's dx tx with humira 40 mg weekly: Has pain in multiple joints with most affected of hands, difficult to do projects at times. Also has SI and lumbar back pain and stiffness that when flares will be present for a week at a time. Exam showed subtle bony prominence of CMCs. Diff dx of OA vs inflammatory bowel dz rt arthropathy vs other such as arthralgias rt to DM or vit d def vs combination. Will check hgA1c, vit D as these are out of date. Does appear pt has early stages of OA. Possibly partially treated inflammatory arthropathy, over all suspicion low, will do MRI lumbar and SI when symptomatic and do baseline hand xrays.      Pt instructions:  You are starting to get osteoarthritis, we will look at your back and SI joints with MRI to determine if you have inflammatory arthritis as well.     1) Labs and xrays today     2) Schedule MRIs    3) Use voltaren gel to painful hand joints     4) See me back in Sept or October      Lisseth Herrera CNP  Rheumatology, [facility]

## 2023-05-04 NOTE — PATIENT INSTRUCTIONS
You are starting to get osteoarthritis, we will look at your back and SI joints with MRI to determine if you have inflammatory arthritis as well.     1) Labs and xrays today     2) Schedule MRIs    3) Use voltaren gel to painful hand joints     4) See me back in Sept or October

## 2023-05-05 ENCOUNTER — TELEPHONE (OUTPATIENT)
Dept: RHEUMATOLOGY | Facility: CLINIC | Age: 48
End: 2023-05-05
Payer: COMMERCIAL

## 2023-05-05 LAB — DEPRECATED CALCIDIOL+CALCIFEROL SERPL-MC: 93 UG/L (ref 20–75)

## 2023-05-05 NOTE — TELEPHONE ENCOUNTER
[facility] Call Center    Phone Message    May a detailed message be left on voicemail: yes     Reason for Call: Medication Question or concern regarding medication   Prescription Clarification  Name of Medication: Diclofenac (VOLTAREN) 1% topical gel  Prescribing Provider: Lisseth Herrera NP   Pharmacy: [facility]   What is the problem? Diclofenac gel is not covered under pt's insurance because it is available over-the-counter. Preferred alternatives would be diclofenac oral tablets, meloxicam, and ibupprofen.     Action Taken: Message routed to:  Adult Clinics: Rheumatology p [phone]    Travel Screening: Not Applicable

## 2023-05-07 LAB
ADALIMUMAB NAB TITR SER BIOASSAY: NOT DETECTED {TITER}
ADALIMUMAB SERPL-MCNC: 24.21 UG/ML
PATHOLOGY STUDY: NORMAL

## 2023-05-09 NOTE — TELEPHONE ENCOUNTER
Patient sent [employer] message today inquiring about why the Diclofenac gel was discontinued.  This was not discontinued by the clinic.      Message sent back to patient with this information from Jayla.  Will have to discuss with Lisseth Herrera NP when she is back in clinic.    Ashleigh López RN

## 2023-05-16 NOTE — TELEPHONE ENCOUNTER
Per Lisseth Herrera NP, Diclofenac gel can be purchased OTC.   Informed patient over [employer], he has read the message.    Ashleigh López RN

## 2023-05-17 ENCOUNTER — OFFICE VISIT (OUTPATIENT)
Dept: DERMATOLOGY | Facility: CLINIC | Age: 48
End: 2023-05-17
Payer: COMMERCIAL

## 2023-05-17 DIAGNOSIS — L81.4 SOLAR LENTIGO: ICD-10-CM

## 2023-05-17 DIAGNOSIS — Z86.018 HISTORY OF DYSPLASTIC NEVUS: Primary | ICD-10-CM

## 2023-05-17 DIAGNOSIS — D18.01 CHERRY ANGIOMA: ICD-10-CM

## 2023-05-17 DIAGNOSIS — D22.9 MULTIPLE BENIGN NEVI: ICD-10-CM

## 2023-05-17 PROCEDURE — 99213 OFFICE O/P EST LOW 20 MIN: CPT | Performed by: PHYSICIAN ASSISTANT

## 2023-05-17 NOTE — NURSING NOTE
Monty Fleming's goals for this visit include:   Chief Complaint   Patient presents with     Skin Check     Patient here for a full body skin check, areas of concern none        He requests these members of his care team be copied on today's visit information:     PCP: Jaron Vines    Referring Provider:  No referring provider defined for this encounter.    There were no vitals taken for this visit.    Do you need any medication refills at today's visit? No       Karena Eagle EMT

## 2023-05-17 NOTE — LETTER
5/17/2023         RE: Monty Fleming  [address]  [place] [facility] [address]        Dear Colleague,    Thank you for referring your patient, Monty Fleming, to the [facility]. Please see a copy of my visit note below.    [facility] Dermatology Note  Encounter Date: May 17, 2023  Office Visit     Dermatology Problem List:  1. Crohn's Disease - Humira since 2018  2. History of DN x2 - patient reported, records requested  - left calf, ~2017  - second DN ~ 2010     Family history: Negative for skin cancer.  Social history: Lived in [place] and [place]. Moved to [place] for job.  ____________________________________________     Assessment & Plan:     # On Humira for Chron's Disease. Discussed increased risk of skin cancers with immunosuppressing medications.   - Recommend sunscreens SPF #30 or greater, protective clothing and avoidance of tanning beds.   - Recommend yearly skin exams.    # Hx DNs  - edu on increased melanoma risk  - continue annual skin exams    # Multiple clinically benign nevi on the trunk and extremities. No treatment is necessary at this time unless the lesion changes or becomes symptomatic.    - ABCDEs of melanoma were discussed and self skin checks were advised.    # Cherry Angiomas - trunk and extremities. Explained to patient benign nature of lesion. No treatment is necessary at this time unless the lesion changes or becomes symptomatic.      # Solar lentigines on the sun exposed skin areas. Benign nature was discussed. No further intervention required at this time.    - Sun precaution was advised including the use of sun screens of SPF 30 or higher, sun protective clothing, and avoidance of tanning beds.        Procedures Performed:   none    Follow-up: 1 year(s) in-person, or earlier for new or changing lesions    Staff and Scribe:     Scribe Disclosure:   I, Rick Sutton, am serving as a scribe to document services personally performed by this  physician, Lorena Arias PA-C, based on data collection and the provider's statements to me.   Provider Disclosure:   The documentation recorded by the scribe accurately reflects the services I personally performed and the decisions made by me.    All risks, benefits and alternatives were discussed with patient.  Patient is in agreement and understands the assessment and plan.  All questions were answered.    Lorena Arias PA-C, MPAS  [facility]: Phone: [phone], Fax: [phone]  [facility]: Phone: [phone],  Fax: [phone]  [facility]: Phone: [phone], Fax: [phone]  ____________________________________________    CC: No chief complaint on file.    HPI:  Mr. Monty Fleming is a(n) 47 year old male who presents today as a return patient for FBSE. On Humira for Chrons. Has been on it about 4-5 years. Hx of DNs in the past.     Last seen 5/11/22 for a skin check. At that time, no concerning lesions were noted.     Patient is otherwise feeling well, without additional skin concerns.    Labs Reviewed:  N/A    Physical Exam:  Vitals: There were no vitals taken for this visit.  SKIN: Full skin, which includes the head/face, both arms, chest, back, abdomen,both legs, genitalia and/or groin buttocks, digits and/or nails, was examined.  - Quezada type II.  - There are dome shaped bright red papules on the trunk and extremities.   - Multiple regular brown pigmented macules and papules are identified on the trunk and extremities, <100.  - Scattered brown macules on sun exposed areas.  - No other lesions of concern on areas examined.     Medications:  Current Outpatient Medications   Medication     acetaminophen (TYLENOL) 500 MG tablet     adalimumab (HUMIRA *CF* PEN) 40 MG/0.4ML pen kit     azelastine-fluticasone (DYMISTA) 137-50 MCG/ACT nasal spray     Butalbital-Acetaminophen (PHRENILIN)   MG TABS per tablet     cholecalciferol 25 MCG (1000 UT) TABS     cyanocobalamin (VITAMIN B-12) 1000 MCG tablet     dextromethorphan-guaiFENesin (MUCINEX DM)  MG 12 hr tablet     diclofenac (VOLTAREN) 1 % topical gel     dicyclomine (BENTYL) 10 MG capsule     eletriptan (RELPAX) 20 MG tablet     famotidine (PEPCID) 40 MG tablet     fexofenadine (ALLEGRA) 180 MG tablet     fluticasone (FLONASE) 50 MCG/ACT nasal spray     hydrocortisone (ANUSOL-HC) 25 MG suppository     hyoscyamine ER (LEVBID) 375 mcg 12 hr tablet     linaclotide (LINZESS) 145 MCG capsule     linaclotide (LINZESS) 72 MCG capsule     LINZESS 290 MCG capsule     magnesium citrate solution     metoprolol succinate ER (TOPROL XL) 100 MG 24 hr tablet     omeprazole (PRILOSEC) 40 MG DR capsule     ondansetron (ZOFRAN-ODT) 4 MG ODT tab     Peppermint Oil (IBGARD PO)     polyethylene glycol (GOLYTELY) 236 g suspension     polyethylene glycol (MIRALAX) 17 GM/Dose powder     tadalafil (CIALIS) 20 MG tablet     No current facility-administered medications for this visit.      Past Medical History:   Patient Active Problem List   Diagnosis     Abnormal MRI of head     Allergic rhinitis     Bursitis, infrapatellar or subpatellar     Chronic bursitis of left shoulder     Cough, persistent     Crohn's disease of colon with complication (H)     ED (erectile dysfunction)     Dysphagia, unspecified     GERD (gastroesophageal reflux disease)     Fatigue     Hearing loss on left     History of pneumonia     History of irritable bowel syndrome     Hyperglycemia     Hypogonadism male     Insufficient sleep syndrome     Migraine headache     CAROLE on CPAP     Peripheral vestibulopathy     Swallowing dysfunction     Tendonitis of ankle, left     Ventricular arrhythmia     Vitamin D deficiency     Past Medical History:   Diagnosis Date     Crohn's disease (H) 2018     Dysplastic nevus      ED (erectile dysfunction)      Fatty liver      Gastroesophageal reflux  disease with esophagitis      Migraine      Plantar fasciitis      PONV (postoperative nausea and vomiting)      PVC's (premature ventricular contractions)      Seasonal allergic rhinitis      Sleep apnea        Again, thank you for allowing me to participate in the care of your patient.        Sincerely,        Lorena Arias PA-C

## 2023-05-17 NOTE — PROGRESS NOTES
[facility] Dermatology Note  Encounter Date: May 17, 2023  Office Visit     Dermatology Problem List:  1. Crohn's Disease - Humira since 2018  2. History of DN x2 - patient reported, records requested  - left calf, ~2017  - second DN ~ 2010     Family history: Negative for skin cancer.  Social history: Lived in [place] and [place]. Moved to [place] for job.  ____________________________________________     Assessment & Plan:     # On Humira for Chron's Disease. Discussed increased risk of skin cancers with immunosuppressing medications.   - Recommend sunscreens SPF #30 or greater, protective clothing and avoidance of tanning beds.   - Recommend yearly skin exams.    # Hx DNs  - edu on increased melanoma risk  - continue annual skin exams    # Multiple clinically benign nevi on the trunk and extremities. No treatment is necessary at this time unless the lesion changes or becomes symptomatic.    - ABCDEs of melanoma were discussed and self skin checks were advised.    # Cherry Angiomas - trunk and extremities. Explained to patient benign nature of lesion. No treatment is necessary at this time unless the lesion changes or becomes symptomatic.      # Solar lentigines on the sun exposed skin areas. Benign nature was discussed. No further intervention required at this time.    - Sun precaution was advised including the use of sun screens of SPF 30 or higher, sun protective clothing, and avoidance of tanning beds.        Procedures Performed:   none    Follow-up: 1 year(s) in-person, or earlier for new or changing lesions    Staff and Scribe:     Scribe Disclosure:   I, Rick Sutton, am serving as a scribe to document services personally performed by this physician, Lorena Arias PA-C, based on data collection and the provider's statements to me.   Provider Disclosure:   The documentation recorded by the scribe accurately reflects the services I personally performed and the decisions made by me.    All  risks, benefits and alternatives were discussed with patient.  Patient is in agreement and understands the assessment and plan.  All questions were answered.    Lorena Arias PA-C, MPAS  [facility]: Phone: [phone], Fax: [phone]  [facility]: Phone: [phone],  Fax: [phone]  [facility]: Phone: [phone], Fax: [phone]  ____________________________________________    CC: No chief complaint on file.    HPI:  Mr. Monty Fleming is a(n) 47 year old male who presents today as a return patient for FBSE. On Humira for Chrons. Has been on it about 4-5 years. Hx of DNs in the past.     Last seen 5/11/22 for a skin check. At that time, no concerning lesions were noted.     Patient is otherwise feeling well, without additional skin concerns.    Labs Reviewed:  N/A    Physical Exam:  Vitals: There were no vitals taken for this visit.  SKIN: Full skin, which includes the head/face, both arms, chest, back, abdomen,both legs, genitalia and/or groin buttocks, digits and/or nails, was examined.  - Quezada type II.  - There are dome shaped bright red papules on the trunk and extremities.   - Multiple regular brown pigmented macules and papules are identified on the trunk and extremities, <100.  - Scattered brown macules on sun exposed areas.  - No other lesions of concern on areas examined.     Medications:  Current Outpatient Medications   Medication     acetaminophen (TYLENOL) 500 MG tablet     adalimumab (HUMIRA *CF* PEN) 40 MG/0.4ML pen kit     azelastine-fluticasone (DYMISTA) 137-50 MCG/ACT nasal spray     Butalbital-Acetaminophen (PHRENILIN)  MG TABS per tablet     cholecalciferol 25 MCG (1000 UT) TABS     cyanocobalamin (VITAMIN B-12) 1000 MCG tablet     dextromethorphan-guaiFENesin (MUCINEX DM)  MG 12 hr tablet     diclofenac (VOLTAREN) 1 % topical gel     dicyclomine (BENTYL)  10 MG capsule     eletriptan (RELPAX) 20 MG tablet     famotidine (PEPCID) 40 MG tablet     fexofenadine (ALLEGRA) 180 MG tablet     fluticasone (FLONASE) 50 MCG/ACT nasal spray     hydrocortisone (ANUSOL-HC) 25 MG suppository     hyoscyamine ER (LEVBID) 375 mcg 12 hr tablet     linaclotide (LINZESS) 145 MCG capsule     linaclotide (LINZESS) 72 MCG capsule     LINZESS 290 MCG capsule     magnesium citrate solution     metoprolol succinate ER (TOPROL XL) 100 MG 24 hr tablet     omeprazole (PRILOSEC) 40 MG DR capsule     ondansetron (ZOFRAN-ODT) 4 MG ODT tab     Peppermint Oil (IBGARD PO)     polyethylene glycol (GOLYTELY) 236 g suspension     polyethylene glycol (MIRALAX) 17 GM/Dose powder     tadalafil (CIALIS) 20 MG tablet     No current facility-administered medications for this visit.      Past Medical History:   Patient Active Problem List   Diagnosis     Abnormal MRI of head     Allergic rhinitis     Bursitis, infrapatellar or subpatellar     Chronic bursitis of left shoulder     Cough, persistent     Crohn's disease of colon with complication (H)     ED (erectile dysfunction)     Dysphagia, unspecified     GERD (gastroesophageal reflux disease)     Fatigue     Hearing loss on left     History of pneumonia     History of irritable bowel syndrome     Hyperglycemia     Hypogonadism male     Insufficient sleep syndrome     Migraine headache     CAROLE on CPAP     Peripheral vestibulopathy     Swallowing dysfunction     Tendonitis of ankle, left     Ventricular arrhythmia     Vitamin D deficiency     Past Medical History:   Diagnosis Date     Crohn's disease (H) 2018     Dysplastic nevus      ED (erectile dysfunction)      Fatty liver      Gastroesophageal reflux disease with esophagitis      Migraine      Plantar fasciitis      PONV (postoperative nausea and vomiting)      PVC's (premature ventricular contractions)      Seasonal allergic rhinitis      Sleep apnea

## 2023-05-24 ENCOUNTER — OFFICE VISIT (OUTPATIENT)
Dept: GASTROENTEROLOGY | Facility: CLINIC | Age: 48
End: 2023-05-24
Payer: COMMERCIAL

## 2023-05-24 VITALS
BODY MASS INDEX: 31.21 KG/M2 | HEIGHT: 73 IN | DIASTOLIC BLOOD PRESSURE: 80 MMHG | WEIGHT: 235.5 LBS | SYSTOLIC BLOOD PRESSURE: 131 MMHG | HEART RATE: 60 BPM | OXYGEN SATURATION: 99 %

## 2023-05-24 DIAGNOSIS — K50.80 CROHN'S DISEASE OF BOTH SMALL AND LARGE INTESTINE WITHOUT COMPLICATION (H): Primary | ICD-10-CM

## 2023-05-24 DIAGNOSIS — R10.84 ABDOMINAL PAIN, GENERALIZED: ICD-10-CM

## 2023-05-24 PROCEDURE — 99214 OFFICE O/P EST MOD 30 MIN: CPT | Performed by: INTERNAL MEDICINE

## 2023-05-24 RX ORDER — DICYCLOMINE HYDROCHLORIDE 10 MG/1
10 CAPSULE ORAL 4 TIMES DAILY PRN
Qty: 270 CAPSULE | Refills: 3 | Status: SHIPPED | OUTPATIENT
Start: 2023-05-24

## 2023-05-24 ASSESSMENT — PAIN SCALES - GENERAL: PAINLEVEL: SEVERE PAIN (6)

## 2023-05-24 NOTE — NURSING NOTE
"Chief Complaint   Patient presents with     Follow Up     6 month follow up.  Combination of constipation and diarrhea,  and abdominal pain.     He requests these members of his care team be copied on today's visit information:  PCP: Jaron Vines    Referring Provider:  Ruby Beck DO  [address] [place] [address]    Vitals:    05/24/23 0827   BP: 131/80   BP Location: Left arm   Patient Position: Sitting   Cuff Size: Adult Regular   Pulse: 60   SpO2: 99%   Weight: 106.8 kg (235 lb 8 oz)   Height: 1.854 m (6' 1\")     Body mass index is 31.07 kg/m .    Medications were reconciled.    Does patient need any medication refills at today's visit? Yes, wan Brooks CMA    "

## 2023-05-24 NOTE — PROGRESS NOTES
GI CLINIC VISIT    CC/REFERRING MD:  Ruby Beck  REASON FOR CONSULTATION:   Ruby Beck for   Chief Complaint   Patient presents with     Follow Up     6 month follow up.  Combination of constipation and diarrhea,  and abdominal pain.         HPI    Billy presents today for follow-up. Continues to have alternating diarrhea/constipation.  Most days has a few looser stools a day although some days will feel like he needs to go but won't.  Worsening symptoms in April as well as orange stools prompted a calprotectin and labs to be done, all of which were normal. Does use bentyl which he finds to be helpful.  Has tried levsin as needed but gives him dry mouth.  Uses miralax occasionally if he goes a day without a bowel movement.    Still with LUQ pain - can occur if he moves in a certain position.  Not reproducible on exam.  Does have a topical cream he uses which soothes things but symptoms don't resolve.    More reflux than normal lately - remains on pepcid.  Unsure if it is bad enough that he feels like he needs to take anything.     Saw rheumatology re:joint pains - thought to likely be OA but undergoing further work-up to evaluate for osteoarthritis. Had humira trough/antibody level done which was adequate with no antibodies.    IBD History:  Time of diagnosis: 2018  Extent of disease: ileocolonic  Phenotype: stricturing  Previous therapies: humira at [id number] - now receiving weekly  EIM: joint pains  Previous surgeries: none for crohns, did have nissen in the past  Most recent endoscopy: 2022  Perianal skin tags found on perianal exam.                             - The examined portion of the ileum was normal.                             - Scarring with distortion of the IC valve and the                             cecum was found. The IC valve was wide open and                             passed easily with the pediatric colonoscope. There                             was some granularity and small  pseudopolyps at the                             IC valve but no active inflammation. . Biopsied.                             - A single ulcer in the transverse colon. Biopsied.                             - One 2 mm polyp in the ascending colon, removed                             with a cold biopsy forceps. Resected and retrieved.                             - One 3 mm polyp in the sigmoid colon, removed with                             a cold biopsy forceps. Resected and retrieved.                             - Diverticulosis in the sigmoid colon.                             - Simple Endoscopic Score for Crohn's Disease: 3,                             mucosal inflammatory changes secondary to Crohn's                             disease. Biopsied.                             Overall there is minimal evidence of any Crohn's                             Disease activity in the colon. One small 4 mm                             ulcer. Otherwise no activity.   Impression:               - Normal esophagus.                             - Z-line regular, 43 cm from the incisors.                             - A Nissen fundoplication was found. The wrap                             appears intact.                             - A patch of friable gastric mucosa in the distal                             gastric body. Biopsied.                             - Normal examined duodenum. Biopsied.                             Overall unremarkable exam. Mild friability in the                             gastric body was biopsied. Can consider trial of                             omeprazole 40 mg daily for 2 months to see if this                             offers any benefit to chronic epigastric/LUQ pain.      A.  Duodenum: Biopsy:  - Duodenal mucosa within normal limits  - Normal villous architecture with no increase in intraepithelial lymphocytes      B.  Stomach: Biopsy:  - Oxyntic type mucosa with mucosal erosion and reactive  epithelial changes  - Antral type mucosa within normal limits  - Immunostain for Helicobacter pylori is in process, with the results to be reported in an addendum      C.  Ileocecal valve: Biopsy:  - Chronic, focally active colitis with architectural disarray, basal plasmacytosis, and focal active cryptitis  - No dysplasia     D.  Colon, ascending: Polypectomy:  - Tubular adenoma  - No evidence of high-grade dysplasia or invasive malignancy      E.  Colon, cecum/ascending: Biopsy:  - Colonic mucosa within normal limits  - No active or chronic colitis  - No dysplasia      F.  Colon, transverse: Biopsy:  - Colonic mucosa within normal limits  - No active or chronic colitis  - No dysplasia      G.  Colon, transverse, ulcer: Biopsy:  - Colonic mucosa with lamina propria hyalinization, withered crypts, and focal active inflammation, see comment     H.  Colon, descending/sigmoid: Biopsy:  - Colonic mucosa within normal limits  - No active or chronic colitis  - No dysplasia       I.  Colon, sigmoid: Polypectomy:  - Tubular adenoma  - No evidence of high-grade dysplasia or invasive malignancy       J.  Rectum: Biopsy:  - Colonic mucosa within normal limits  - No active or chronic colitis  - No dysplasia        ROS:    No fevers or chills  No weight loss  No blurry vision, double vision or change in vision  No sore throat  No lymphadenopathy  No headache, paraesthesias, or weakness in a limb  No shortness of breath or wheezing  No chest pain or pressure  No arthralgias or myalgias  No rashes or skin changes  No odynophagia or dysphagia  No BRBPR, hematochezia, melena  No dysuria, frequency or urgency  No hot/cold intolerance or polyria  No anxiety or depression    PROBLEM LIST  Patient Active Problem List    Diagnosis Date Noted     GERD (gastroesophageal reflux disease) 05/04/2023     Priority: Medium     History of pneumonia 05/04/2023     Priority: Medium     History of irritable bowel syndrome 05/04/2023     Priority:  Medium     Insufficient sleep syndrome 10/01/2021     Priority: Medium     Hyperglycemia 06/02/2019     Priority: Medium     Crohn's disease of colon with complication (H) 01/04/2019     Priority: Medium     Chronic bursitis of left shoulder 09/26/2018     Priority: Medium     Allergic rhinitis 09/30/2017     Priority: Medium     Fatigue 03/20/2017     Priority: Medium     Dysphagia, unspecified 10/13/2015     Priority: Medium     ED (erectile dysfunction) 09/19/2015     Priority: Medium     Hypogonadism male 09/19/2015     Priority: Medium     CAROLE on CPAP 09/19/2015     Priority: Medium     Swallowing dysfunction 09/19/2015     Priority: Medium     Ventricular arrhythmia 09/19/2015     Priority: Medium     Vitamin D deficiency 09/19/2015     Priority: Medium     Bursitis, infrapatellar or subpatellar 08/14/2015     Priority: Medium     Tendonitis of ankle, left 08/14/2015     Priority: Medium     Cough, persistent 05/18/2015     Priority: Medium     Hearing loss on left 05/18/2015     Priority: Medium     Abnormal MRI of head 12/31/2014     Priority: Medium     Migraine headache 12/31/2014     Priority: Medium     Peripheral vestibulopathy 03/14/2014     Priority: Medium     Formatting of this note might be different from the original.  Left ear, symptoms resolved with vestibular rehab         PERTINENT PAST MEDICAL HISTORY:  Past Medical History:   Diagnosis Date     Crohn's disease (H) 2018     Dysplastic nevus      ED (erectile dysfunction)      Fatty liver      Gastroesophageal reflux disease with esophagitis      Migraine      Plantar fasciitis      PONV (postoperative nausea and vomiting)      PVC's (premature ventricular contractions)      Seasonal allergic rhinitis      Sleep apnea        PREVIOUS SURGERIES:  Past Surgical History:   Procedure Laterality Date     COLONOSCOPY N/A 6/22/2022    Procedure: COLONOSCOPY, WITH POLYPECTOMY AND BIOPSY;  Surgeon: Ariel Drew MD;  Location:  GI      ESOPHAGOSCOPY, GASTROSCOPY, DUODENOSCOPY (EGD), COMBINED N/A 6/22/2022    Procedure: ESOPHAGOGASTRODUODENOSCOPY, WITH BIOPSY;  Surgeon: Ariel Drew MD;  Location:  GI     NISSEN FUNDOPLICATION N/A 2016    x 2       PREVIOUS ENDOSCOPY:  As above    ALLERGIES:   No Known Allergies    PERTINENT MEDICATIONS:    Current Outpatient Medications:      acetaminophen (TYLENOL) 500 MG tablet, Take 500 mg by mouth, Disp: , Rfl:      adalimumab (HUMIRA *CF* PEN) 40 MG/0.4ML pen kit, Inject 0.4 mLs (40 mg) Subcutaneous every 7 days, Disp: 4 each, Rfl: 11     azelastine-fluticasone (DYMISTA) 137-50 MCG/ACT nasal spray, Spray 1 spray in nostril, Disp: , Rfl:      Butalbital-Acetaminophen (PHRENILIN)  MG TABS per tablet, , Disp: , Rfl:      cholecalciferol 25 MCG (1000 UT) TABS, Take 2,000 Units by mouth, Disp: , Rfl:      cyanocobalamin (VITAMIN B-12) 1000 MCG tablet, Take 1,000 mcg by mouth daily, Disp: , Rfl:      dextromethorphan-guaiFENesin (MUCINEX DM)  MG 12 hr tablet, Take 1 tablet by mouth every 12 hours, Disp: , Rfl:      diclofenac (VOLTAREN) 1 % topical gel, Apply 2 g topically 4 times daily, Disp: 100 g, Rfl: 11     dicyclomine (BENTYL) 10 MG capsule, Take 1 capsule (10 mg) by mouth 4 times daily as needed (abdominal pain), Disp: 90 capsule, Rfl: 3     eletriptan (RELPAX) 20 MG tablet, Take 20 mg by mouth at onset of headache , Disp: , Rfl:      famotidine (PEPCID) 40 MG tablet, Take 1 tablet (40 mg) by mouth nightly as needed for heartburn, Disp: 30 tablet, Rfl: 11     fexofenadine (ALLEGRA) 180 MG tablet, Take 180 mg by mouth, Disp: , Rfl:      fluticasone (FLONASE) 50 MCG/ACT nasal spray, 1 spray, Disp: , Rfl:      hydrocortisone (ANUSOL-HC) 25 MG suppository, Place 1 suppository (25 mg) rectally 2 times daily as needed for hemorrhoids, Disp: 24 suppository, Rfl: 3     hyoscyamine ER (LEVBID) 375 mcg 12 hr tablet, Take 0.375 mg by mouth every 12 hours as needed , Disp: , Rfl:       linaclotide (LINZESS) 72 MCG capsule, Take 1 capsule (72 mcg) by mouth every morning (before breakfast), Disp: 90 capsule, Rfl: 3     metoprolol succinate ER (TOPROL XL) 100 MG 24 hr tablet, Take 1 tablet by mouth daily, Disp: , Rfl:      ondansetron (ZOFRAN-ODT) 4 MG ODT tab, Take 4 mg by mouth every 6 hours as needed , Disp: , Rfl:      Peppermint Oil (IBGARD PO), Take 180 mg by mouth daily, Disp: , Rfl:      polyethylene glycol (MIRALAX) 17 GM/Dose powder, Take 17 g by mouth, Disp: , Rfl:      tadalafil (CIALIS) 20 MG tablet, Take 20 mg by mouth, Disp: , Rfl:     SOCIAL HISTORY:  Social History     Socioeconomic History     Marital status:      Spouse name: Not on file     Number of children: Not on file     Years of education: Not on file     Highest education level: Not on file   Occupational History     Not on file   Tobacco Use     Smoking status: Never     Smokeless tobacco: Never   Vaping Use     Vaping status: Never Used   Substance and Sexual Activity     Alcohol use: Never     Drug use: Not on file     Sexual activity: Yes     Partners: Female   Other Topics Concern     Not on file   Social History Narrative     Not on file     Social Determinants of Health     Financial Resource Strain: Not on file   Food Insecurity: Not on file   Transportation Needs: Not on file   Physical Activity: Not on file   Stress: Not on file   Social Connections: Not on file   Intimate Partner Violence: Not on file   Housing Stability: Not on file       FAMILY HISTORY:  Family History   Problem Relation Age of Onset     Diabetes Maternal Grandfather      Coronary Artery Disease Paternal Grandfather        Past/family/social history reviewed and no changes    PHYSICAL EXAMINATION:  Constitutional: aaox3, cooperative, pleasant, not dyspneic/diaphoretic, no acute distress  Vitals reviewed: There were no vitals taken for this visit.  Wt:   Wt Readings from Last 2 Encounters:   05/04/23 107.4 kg (236 lb 12.8 oz)   06/22/22  104.3 kg (230 lb)      Eyes: Sclera anicteric/injected  Ears/nose/mouth/throat: Normal oropharynx without ulcers or exudate, mucus membranes moist, hearing intact  Neck: supple, thyroid normal size  CV: No edema  Respiratory: Unlabored breathing  Lymph: No axillary, submandibular, supraclavicular or inguinal lymphadenopathy  Abd: soft,  Nondistended, no hepatosplenomegaly, nontender, no peritoneal signs  Skin: warm, perfused, no jaundice  Psych: Normal affect  MSK: Normal gait      PERTINENT STUDIES:  Most recent CBC:  Recent Labs   Lab Test 04/07/23  1319 04/22/21  1337   WBC 5.8 7.3   HGB 15.2 15.8   HCT 44.3 47.3    265     Most recent hepatic panel:  Recent Labs   Lab Test 04/07/23  1319 04/22/21  1337   ALT 54* 76*   AST 40 40     Most recent creatinine:  Recent Labs   Lab Test 04/07/23  1319 04/22/21  1337   CR 0.81 0.79       ASSESSMENT/PLAN:    # crohns ileocolitis - continues to alternate between constipation and diarrhea as well as struggle with bloating and abdominal pain. Suspect overlapping IBS but other etiologies should be evaluated as well. Recent normal calprotectin is reassuring.  Will start by updating MRE.  If unremarkable, may consider breath testing for SIBO.  For now - will continue bentyl.  Will add fiber supplement to help promote more regular BMs. Humira level adequate without antibodies on recent labs with rheumatology    # periodic LUQ pain - MRE as above. Still question if there is an MSK component to this.  Recent CT unrevealing    # joint pain - following with rheumatology - eval for reactive arthritis ongoing    RTC 6 months or sooner if needed.    Vaccinations:  -- Influenza (every year): encourage to receive when available  -- TdaP (every 10 years): received last year  -- Pneumococcal Pneumonia (once then every 5 years): Last given will address at next visit - received prevnar in 2019 - will discuss pneumovax  -- Yearly assessment for latent Tb (verbal screening and exam, PPD  or QuantiFERON-Tb testing): Last obtained 2018 negative  - Covid vaccine - received 2021     One time confirmation of immunity or serologies:  -- Hepatitis A (serologies or immunizations): will discuss vaccine at next visit  -- Hepatitis B (serologies or immunizations): negative in 2018 - will discuss vaccine at next visit  -- MMR: will address at next visit  -- HPV (all aged 18-26): n/a  -- Meningococcal meningitis (all patients at risk for meningitis): N/a  -- Due to the immunosuppression in this patient, I would not advise administration of live vaccines such as varicella/VZV, intranasal influenza, MMR, or yellow fever vaccine (if travelling).       Bone mineral density screening   -- Recommend all patients supplement with calcium and vitamin D  -- Given prior steroid use recommend DEXA if not already done     Cancer Screening:  Colon cancer screening:     Skin cancer screening: Annual visual exam of skin by dermatologist since patient is immunocompromised - discussed with patient today given his history - referral placed to dermatology     Depression Screening:        Misc:  -- Avoid tobacco use  -- Avoid NSAIDs as there is potentially a 25% chance of causing an IBD flare     RTC 6 months

## 2023-05-24 NOTE — LETTER
5/24/2023         RE: Monty Fleming  [address]  [facility] [place] [address]        Dear Colleague,    Thank you for referring your patient, Monty Fleming, to the [facility]. Please see a copy of my visit note below.    GI CLINIC VISIT    CC/REFERRING MD:  Ruby Beck  REASON FOR CONSULTATION:   Ruby Beck for   Chief Complaint   Patient presents with     Follow Up     6 month follow up.  Combination of constipation and diarrhea,  and abdominal pain.         HPI    Billy presents today for follow-up. Continues to have alternating diarrhea/constipation.  Most days has a few looser stools a day although some days will feel like he needs to go but won't.  Worsening symptoms in April as well as orange stools prompted a calprotectin and labs to be done, all of which were normal. Does use bentyl which he finds to be helpful.  Has tried levsin as needed but gives him dry mouth.  Uses miralax occasionally if he goes a day without a bowel movement.    Still with LUQ pain - can occur if he moves in a certain position.  Not reproducible on exam.  Does have a topical cream he uses which soothes things but symptoms don't resolve.    More reflux than normal lately - remains on pepcid.  Unsure if it is bad enough that he feels like he needs to take anything.     Saw rheumatology re:joint pains - thought to likely be OA but undergoing further work-up to evaluate for osteoarthritis. Had humira trough/antibody level done which was adequate with no antibodies.    IBD History:  Time of diagnosis: 2018  Extent of disease: ileocolonic  Phenotype: stricturing  Previous therapies: humira at q0ymbmm - now receiving weekly  EIM: joint pains  Previous surgeries: none for crohns, did have nissen in the past  Most recent endoscopy: 2022  Perianal skin tags found on perianal exam.                             - The examined portion of the ileum was normal.                             - Scarring with distortion of  the IC valve and the                             cecum was found. The IC valve was wide open and                             passed easily with the pediatric colonoscope. There                             was some granularity and small pseudopolyps at the                             IC valve but no active inflammation. . Biopsied.                             - A single ulcer in the transverse colon. Biopsied.                             - One 2 mm polyp in the ascending colon, removed                             with a cold biopsy forceps. Resected and retrieved.                             - One 3 mm polyp in the sigmoid colon, removed with                             a cold biopsy forceps. Resected and retrieved.                             - Diverticulosis in the sigmoid colon.                             - Simple Endoscopic Score for Crohn's Disease: 3,                             mucosal inflammatory changes secondary to Crohn's                             disease. Biopsied.                             Overall there is minimal evidence of any Crohn's                             Disease activity in the colon. One small 4 mm                             ulcer. Otherwise no activity.   Impression:               - Normal esophagus.                             - Z-line regular, 43 cm from the incisors.                             - A Nissen fundoplication was found. The wrap                             appears intact.                             - A patch of friable gastric mucosa in the distal                             gastric body. Biopsied.                             - Normal examined duodenum. Biopsied.                             Overall unremarkable exam. Mild friability in the                             gastric body was biopsied. Can consider trial of                             omeprazole 40 mg daily for 2 months to see if this                             offers any benefit to chronic epigastric/LUQ pain.       A.  Duodenum: Biopsy:  - Duodenal mucosa within normal limits  - Normal villous architecture with no increase in intraepithelial lymphocytes      B.  Stomach: Biopsy:  - Oxyntic type mucosa with mucosal erosion and reactive epithelial changes  - Antral type mucosa within normal limits  - Immunostain for Helicobacter pylori is in process, with the results to be reported in an addendum      C.  Ileocecal valve: Biopsy:  - Chronic, focally active colitis with architectural disarray, basal plasmacytosis, and focal active cryptitis  - No dysplasia     D.  Colon, ascending: Polypectomy:  - Tubular adenoma  - No evidence of high-grade dysplasia or invasive malignancy      E.  Colon, cecum/ascending: Biopsy:  - Colonic mucosa within normal limits  - No active or chronic colitis  - No dysplasia      F.  Colon, transverse: Biopsy:  - Colonic mucosa within normal limits  - No active or chronic colitis  - No dysplasia      G.  Colon, transverse, ulcer: Biopsy:  - Colonic mucosa with lamina propria hyalinization, withered crypts, and focal active inflammation, see comment     H.  Colon, descending/sigmoid: Biopsy:  - Colonic mucosa within normal limits  - No active or chronic colitis  - No dysplasia       I.  Colon, sigmoid: Polypectomy:  - Tubular adenoma  - No evidence of high-grade dysplasia or invasive malignancy       J.  Rectum: Biopsy:  - Colonic mucosa within normal limits  - No active or chronic colitis  - No dysplasia        ROS:    No fevers or chills  No weight loss  No blurry vision, double vision or change in vision  No sore throat  No lymphadenopathy  No headache, paraesthesias, or weakness in a limb  No shortness of breath or wheezing  No chest pain or pressure  No arthralgias or myalgias  No rashes or skin changes  No odynophagia or dysphagia  No BRBPR, hematochezia, melena  No dysuria, frequency or urgency  No hot/cold intolerance or polyria  No anxiety or depression    PROBLEM LIST  Patient Active Problem  List    Diagnosis Date Noted     GERD (gastroesophageal reflux disease) 05/04/2023     Priority: Medium     History of pneumonia 05/04/2023     Priority: Medium     History of irritable bowel syndrome 05/04/2023     Priority: Medium     Insufficient sleep syndrome 10/01/2021     Priority: Medium     Hyperglycemia 06/02/2019     Priority: Medium     Crohn's disease of colon with complication (H) 01/04/2019     Priority: Medium     Chronic bursitis of left shoulder 09/26/2018     Priority: Medium     Allergic rhinitis 09/30/2017     Priority: Medium     Fatigue 03/20/2017     Priority: Medium     Dysphagia, unspecified 10/13/2015     Priority: Medium     ED (erectile dysfunction) 09/19/2015     Priority: Medium     Hypogonadism male 09/19/2015     Priority: Medium     CAROLE on CPAP 09/19/2015     Priority: Medium     Swallowing dysfunction 09/19/2015     Priority: Medium     Ventricular arrhythmia 09/19/2015     Priority: Medium     Vitamin D deficiency 09/19/2015     Priority: Medium     Bursitis, infrapatellar or subpatellar 08/14/2015     Priority: Medium     Tendonitis of ankle, left 08/14/2015     Priority: Medium     Cough, persistent 05/18/2015     Priority: Medium     Hearing loss on left 05/18/2015     Priority: Medium     Abnormal MRI of head 12/31/2014     Priority: Medium     Migraine headache 12/31/2014     Priority: Medium     Peripheral vestibulopathy 03/14/2014     Priority: Medium     Formatting of this note might be different from the original.  Left ear, symptoms resolved with vestibular rehab         PERTINENT PAST MEDICAL HISTORY:  Past Medical History:   Diagnosis Date     Crohn's disease (H) 2018     Dysplastic nevus      ED (erectile dysfunction)      Fatty liver      Gastroesophageal reflux disease with esophagitis      Migraine      Plantar fasciitis      PONV (postoperative nausea and vomiting)      PVC's (premature ventricular contractions)      Seasonal allergic rhinitis      Sleep apnea         PREVIOUS SURGERIES:  Past Surgical History:   Procedure Laterality Date     COLONOSCOPY N/A 6/22/2022    Procedure: COLONOSCOPY, WITH POLYPECTOMY AND BIOPSY;  Surgeon: Ariel Drew MD;  Location:  GI     ESOPHAGOSCOPY, GASTROSCOPY, DUODENOSCOPY (EGD), COMBINED N/A 6/22/2022    Procedure: ESOPHAGOGASTRODUODENOSCOPY, WITH BIOPSY;  Surgeon: Ariel Drew MD;  Location: [facility]     NISSEN FUNDOPLICATION N/A 2016    x 2       PREVIOUS ENDOSCOPY:  As above    ALLERGIES:   No Known Allergies    PERTINENT MEDICATIONS:    Current Outpatient Medications:      acetaminophen (TYLENOL) 500 MG tablet, Take 500 mg by mouth, Disp: , Rfl:      adalimumab (HUMIRA *CF* PEN) 40 MG/0.4ML pen kit, Inject 0.4 mLs (40 mg) Subcutaneous every 7 days, Disp: 4 each, Rfl: 11     azelastine-fluticasone (DYMISTA) 137-50 MCG/ACT nasal spray, Spray 1 spray in nostril, Disp: , Rfl:      Butalbital-Acetaminophen (PHRENILIN)  MG TABS per tablet, , Disp: , Rfl:      cholecalciferol 25 MCG (1000 UT) TABS, Take 2,000 Units by mouth, Disp: , Rfl:      cyanocobalamin (VITAMIN B-12) 1000 MCG tablet, Take 1,000 mcg by mouth daily, Disp: , Rfl:      dextromethorphan-guaiFENesin (MUCINEX DM)  MG 12 hr tablet, Take 1 tablet by mouth every 12 hours, Disp: , Rfl:      diclofenac (VOLTAREN) 1 % topical gel, Apply 2 g topically 4 times daily, Disp: 100 g, Rfl: 11     dicyclomine (BENTYL) 10 MG capsule, Take 1 capsule (10 mg) by mouth 4 times daily as needed (abdominal pain), Disp: 90 capsule, Rfl: 3     eletriptan (RELPAX) 20 MG tablet, Take 20 mg by mouth at onset of headache , Disp: , Rfl:      famotidine (PEPCID) 40 MG tablet, Take 1 tablet (40 mg) by mouth nightly as needed for heartburn, Disp: 30 tablet, Rfl: 11     fexofenadine (ALLEGRA) 180 MG tablet, Take 180 mg by mouth, Disp: , Rfl:      fluticasone (FLONASE) 50 MCG/ACT nasal spray, 1 spray, Disp: , Rfl:      hydrocortisone (ANUSOL-HC) 25 MG suppository, Place 1  suppository (25 mg) rectally 2 times daily as needed for hemorrhoids, Disp: 24 suppository, Rfl: 3     hyoscyamine ER (LEVBID) 375 mcg 12 hr tablet, Take 0.375 mg by mouth every 12 hours as needed , Disp: , Rfl:      linaclotide (LINZESS) 72 MCG capsule, Take 1 capsule (72 mcg) by mouth every morning (before breakfast), Disp: 90 capsule, Rfl: 3     metoprolol succinate ER (TOPROL XL) 100 MG 24 hr tablet, Take 1 tablet by mouth daily, Disp: , Rfl:      ondansetron (ZOFRAN-ODT) 4 MG ODT tab, Take 4 mg by mouth every 6 hours as needed , Disp: , Rfl:      Peppermint Oil (IBGARD PO), Take 180 mg by mouth daily, Disp: , Rfl:      polyethylene glycol (MIRALAX) 17 GM/Dose powder, Take 17 g by mouth, Disp: , Rfl:      tadalafil (CIALIS) 20 MG tablet, Take 20 mg by mouth, Disp: , Rfl:     SOCIAL HISTORY:  Social History     Socioeconomic History     Marital status:      Spouse name: Not on file     Number of children: Not on file     Years of education: Not on file     Highest education level: Not on file   Occupational History     Not on file   Tobacco Use     Smoking status: Never     Smokeless tobacco: Never   Vaping Use     Vaping status: Never Used   Substance and Sexual Activity     Alcohol use: Never     Drug use: Not on file     Sexual activity: Yes     Partners: Female   Other Topics Concern     Not on file   Social History Narrative     Not on file     Social Determinants of Health     Financial Resource Strain: Not on file   Food Insecurity: Not on file   Transportation Needs: Not on file   Physical Activity: Not on file   Stress: Not on file   Social Connections: Not on file   Intimate Partner Violence: Not on file   Housing Stability: Not on file       FAMILY HISTORY:  Family History   Problem Relation Age of Onset     Diabetes Maternal Grandfather      Coronary Artery Disease Paternal Grandfather        Past/family/social history reviewed and no changes    PHYSICAL EXAMINATION:  Constitutional: aaox3,  cooperative, pleasant, not dyspneic/diaphoretic, no acute distress  Vitals reviewed: There were no vitals taken for this visit.  Wt:   Wt Readings from Last 2 Encounters:   05/04/23 107.4 kg (236 lb 12.8 oz)   06/22/22 104.3 kg (230 lb)      Eyes: Sclera anicteric/injected  Ears/nose/mouth/throat: Normal oropharynx without ulcers or exudate, mucus membranes moist, hearing intact  Neck: supple, thyroid normal size  CV: No edema  Respiratory: Unlabored breathing  Lymph: No axillary, submandibular, supraclavicular or inguinal lymphadenopathy  Abd: soft,  Nondistended, no hepatosplenomegaly, nontender, no peritoneal signs  Skin: warm, perfused, no jaundice  Psych: Normal affect  MSK: Normal gait      PERTINENT STUDIES:  Most recent CBC:  Recent Labs   Lab Test 04/07/23  1319 04/22/21  1337   WBC 5.8 7.3   HGB 15.2 15.8   HCT 44.3 47.3    265     Most recent hepatic panel:  Recent Labs   Lab Test 04/07/23  1319 04/22/21  1337   ALT 54* 76*   AST 40 40     Most recent creatinine:  Recent Labs   Lab Test 04/07/23  1319 04/22/21  1337   CR 0.81 0.79       ASSESSMENT/PLAN:    # crohns ileocolitis - continues to alternate between constipation and diarrhea as well as struggle with bloating and abdominal pain. Suspect overlapping IBS but other etiologies should be evaluated as well. Recent normal calprotectin is reassuring.  Will start by updating MRE.  If unremarkable, may consider breath testing for SIBO.  For now - will continue bentyl.  Will add fiber supplement to help promote more regular BMs. Humira level adequate without antibodies on recent labs with rheumatology    # periodic LUQ pain - MRE as above. Still question if there is an MSK component to this.  Recent CT unrevealing    # joint pain - following with rheumatology - eval for reactive arthritis ongoing    RTC 6 months or sooner if needed.    Vaccinations:  -- Influenza (every year): encourage to receive when available  -- TdaP (every 10 years): received  last year  -- Pneumococcal Pneumonia (once then every 5 years): Last given will address at next visit - received prevnar in 2019 - will discuss pneumovax  -- Yearly assessment for latent Tb (verbal screening and exam, PPD or QuantiFERON-Tb testing): Last obtained 2018 negative  - Covid vaccine - received 2021     One time confirmation of immunity or serologies:  -- Hepatitis A (serologies or immunizations): will discuss vaccine at next visit  -- Hepatitis B (serologies or immunizations): negative in 2018 - will discuss vaccine at next visit  -- MMR: will address at next visit  -- HPV (all aged 18-26): n/a  -- Meningococcal meningitis (all patients at risk for meningitis): N/a  -- Due to the immunosuppression in this patient, I would not advise administration of live vaccines such as varicella/VZV, intranasal influenza, MMR, or yellow fever vaccine (if travelling).       Bone mineral density screening   -- Recommend all patients supplement with calcium and vitamin D  -- Given prior steroid use recommend DEXA if not already done     Cancer Screening:  Colon cancer screening:     Skin cancer screening: Annual visual exam of skin by dermatologist since patient is immunocompromised - discussed with patient today given his history - referral placed to dermatology     Depression Screening:        Misc:  -- Avoid tobacco use  -- Avoid NSAIDs as there is potentially a 25% chance of causing an IBD flare     RTC 6 months      Again, thank you for allowing me to participate in the care of your patient.        Sincerely,        Ruby Beck DO

## 2023-05-24 NOTE — PATIENT INSTRUCTIONS
Try taking citrucel daily to help regulate bowel movements.  Please have an MRE done  You are due for labs around October  You can try OTC antacids like tums, gaviscon, mylanta, etc for reflux.  If symptoms are worsening, let me know and we can have you do a 2-4 week course of omeprazole.

## 2023-06-04 ENCOUNTER — HEALTH MAINTENANCE LETTER (OUTPATIENT)
Age: 48
End: 2023-06-04

## 2023-06-22 ENCOUNTER — ANCILLARY PROCEDURE (OUTPATIENT)
Dept: MRI IMAGING | Facility: CLINIC | Age: 48
End: 2023-06-22
Attending: INTERNAL MEDICINE
Payer: COMMERCIAL

## 2023-06-22 DIAGNOSIS — K50.80 CROHN'S DISEASE OF BOTH SMALL AND LARGE INTESTINE WITHOUT COMPLICATION (H): ICD-10-CM

## 2023-06-22 PROCEDURE — A9585 GADOBUTROL INJECTION: HCPCS | Mod: JZ | Performed by: RADIOLOGY

## 2023-06-22 PROCEDURE — 72197 MRI PELVIS W/O & W/DYE: CPT | Performed by: RADIOLOGY

## 2023-06-22 PROCEDURE — 74183 MRI ABD W/O CNTR FLWD CNTR: CPT | Performed by: RADIOLOGY

## 2023-06-22 RX ORDER — GADOBUTROL 604.72 MG/ML
10 INJECTION INTRAVENOUS ONCE
Status: COMPLETED | OUTPATIENT
Start: 2023-06-22 | End: 2023-06-22

## 2023-06-22 RX ADMIN — GADOBUTROL 10 ML: 604.72 INJECTION INTRAVENOUS at 08:25

## 2023-07-03 ENCOUNTER — TELEPHONE (OUTPATIENT)
Dept: GASTROENTEROLOGY | Facility: CLINIC | Age: 48
End: 2023-07-03
Payer: COMMERCIAL

## 2023-07-03 NOTE — TELEPHONE ENCOUNTER
Call back to [facility]. Spoke to a rep who said that we need to wait for the prior auth to be approved. The PA for the humira has been submitted and it is being reviewed. Currently the anticipated delivery date is 7/6/23.    Melani Avery RN

## 2023-07-03 NOTE — TELEPHONE ENCOUNTER
[facility] Call Center    Phone Message    May a detailed message be left on voicemail: yes     Reason for Call: Other:     [facility] to discuss the prior authorization that was previously faxed to the clinic  for the pt's Humira     [phone]  Ref#[id number]    Action Taken: Message routed to:  [facility]    Travel Screening: Not Applicable

## 2023-07-07 ENCOUNTER — TELEPHONE (OUTPATIENT)
Dept: GASTROENTEROLOGY | Facility: CLINIC | Age: 48
End: 2023-07-07
Payer: COMMERCIAL

## 2023-07-07 ENCOUNTER — MYC MEDICAL ADVICE (OUTPATIENT)
Dept: GASTROENTEROLOGY | Facility: CLINIC | Age: 48
End: 2023-07-07
Payer: COMMERCIAL

## 2023-07-07 DIAGNOSIS — K50.80 CROHN'S DISEASE OF BOTH SMALL AND LARGE INTESTINE WITHOUT COMPLICATION (H): ICD-10-CM

## 2023-07-07 NOTE — TELEPHONE ENCOUNTER
Prior Authorization Specialty Medication Request    Medication/Dose: Humira  ICD code (if different than what is on RX):    Previously Tried and Failed:      Important Lab Values:   Rationale:     Insurance Name:   Insurance ID:   Insurance Phone Number:     Pharmacy Information (if different than what is on RX)  Name:    Phone:

## 2023-07-07 NOTE — TELEPHONE ENCOUNTER
Called [facility] [phone] to inquire about case# [id number]    Per rep clinical questions need to be answered.    Answered over the phone.

## 2023-07-07 NOTE — TELEPHONE ENCOUNTER
Per [employer] liaison Alexander is not in today, there is no message as to who is covering him.       Calling [facility] to inquire about the PA status.  Per call to [facility] this was denied (?) already.  Case# [id number].     Per M Humira was already been approved through 10/27/2023

## 2023-07-07 NOTE — TELEPHONE ENCOUNTER
Prior Authorization Approval    Authorization Effective Date: 6/7/2023  Authorization Expiration Date: 7/6/2024  Medication: Humira Pen (CF) 40MG/0.4ML pen-injector kit    Approved Dose/Quantity: 12 per 84 days  Reference #:     Insurance Company: [facility] - Phone [phone] Fax [phone]  Expected CoPay:       CoPay Card Available:      Foundation Assistance Needed:    Which Pharmacy is filling the prescription (Not needed for infusion/clinic administered): [address]  Pharmacy Notified: Yes  Patient Notified: Yes    Approved via phone, approved Case# [id number]  Pharmacy was called and made aware.     Per pharmacy patient has to call to schedule a delivery.  Called patient, left him a message to call [facility].

## 2023-07-10 RX ORDER — ADALIMUMAB 40MG/0.4ML
40 KIT SUBCUTANEOUS
Qty: 4 EACH | Refills: 11 | Status: SHIPPED | OUTPATIENT
Start: 2023-07-10 | End: 2024-05-31

## 2023-07-10 NOTE — TELEPHONE ENCOUNTER
Adalimumab (Humira *CF* pen) 40mg/0.4ml pen kit.  Inject 0.4mls (40mg) subcutaneous every 7 days.    Last Written Prescription Date: 10/27/22  Last Fill Quantity: 4,  # refills: 11   Last office visit: 5/24/2023 ; last virtual visit: 11/9/2022 with prescribing provider:       Future Office Visit:  11/22/23      Routing refill request to provider for review/approval because:  Drug not on the G refill protocol     Melani Avery RN

## 2023-10-25 DIAGNOSIS — K21.9 GASTROESOPHAGEAL REFLUX DISEASE, UNSPECIFIED WHETHER ESOPHAGITIS PRESENT: ICD-10-CM

## 2023-10-26 RX ORDER — FAMOTIDINE 40 MG/1
TABLET, FILM COATED ORAL
Qty: 30 TABLET | Refills: 11 | Status: SHIPPED | OUTPATIENT
Start: 2023-10-26 | End: 2024-06-11

## 2023-10-26 NOTE — TELEPHONE ENCOUNTER
Famotidine (Pepcid) 40mg tablet.  Take 1 tablet (40mg) by mouth nightly as needed for heartburn.    Last Written Prescription Date:  11/9/22  Last Fill Quantity: 30,  # refills: 11   Last office visit: 5/24/2023 ; last virtual visit: 11/9/2022 with prescribing provider:     Future Office Visit:  12/27/23    Refill request approved per [facility] protocol.    Melani Avery RN

## 2023-12-27 ENCOUNTER — TELEPHONE (OUTPATIENT)
Dept: GASTROENTEROLOGY | Facility: CLINIC | Age: 48
End: 2023-12-27

## 2023-12-27 ENCOUNTER — OFFICE VISIT (OUTPATIENT)
Dept: GASTROENTEROLOGY | Facility: CLINIC | Age: 48
End: 2023-12-27
Attending: INTERNAL MEDICINE
Payer: COMMERCIAL

## 2023-12-27 VITALS
BODY MASS INDEX: 31.51 KG/M2 | SYSTOLIC BLOOD PRESSURE: 111 MMHG | HEART RATE: 64 BPM | DIASTOLIC BLOOD PRESSURE: 82 MMHG | WEIGHT: 237.8 LBS | OXYGEN SATURATION: 98 % | HEIGHT: 73 IN

## 2023-12-27 DIAGNOSIS — K50.80 CROHN'S DISEASE OF BOTH SMALL AND LARGE INTESTINE WITHOUT COMPLICATION (H): Primary | ICD-10-CM

## 2023-12-27 LAB
ALBUMIN SERPL BCG-MCNC: 4.6 G/DL (ref 3.5–5.2)
ALP SERPL-CCNC: 72 U/L (ref 40–150)
ALT SERPL W P-5'-P-CCNC: 37 U/L (ref 0–70)
ANION GAP SERPL CALCULATED.3IONS-SCNC: 11 MMOL/L (ref 7–15)
AST SERPL W P-5'-P-CCNC: 30 U/L (ref 0–45)
BILIRUB SERPL-MCNC: 0.7 MG/DL
BUN SERPL-MCNC: 11.9 MG/DL (ref 6–20)
CALCIUM SERPL-MCNC: 9.5 MG/DL (ref 8.6–10)
CHLORIDE SERPL-SCNC: 102 MMOL/L (ref 98–107)
CREAT SERPL-MCNC: 0.95 MG/DL (ref 0.67–1.17)
CRP SERPL-MCNC: <3 MG/L
DEPRECATED HCO3 PLAS-SCNC: 28 MMOL/L (ref 22–29)
EGFRCR SERPLBLD CKD-EPI 2021: >90 ML/MIN/1.73M2
ERYTHROCYTE [DISTWIDTH] IN BLOOD BY AUTOMATED COUNT: 12.8 % (ref 10–15)
ERYTHROCYTE [SEDIMENTATION RATE] IN BLOOD BY WESTERGREN METHOD: 2 MM/HR (ref 0–15)
FERRITIN SERPL-MCNC: 43 NG/ML (ref 31–409)
FOLATE SERPL-MCNC: 17.4 NG/ML (ref 4.6–34.8)
GLUCOSE SERPL-MCNC: 98 MG/DL (ref 70–99)
HCT VFR BLD AUTO: 46.1 % (ref 40–53)
HGB BLD-MCNC: 15.6 G/DL (ref 13.3–17.7)
IRON BINDING CAPACITY (ROCHE): 287 UG/DL (ref 240–430)
IRON SATN MFR SERPL: 51 % (ref 15–46)
IRON SERPL-MCNC: 145 UG/DL (ref 61–157)
MCH RBC QN AUTO: 31.1 PG (ref 26.5–33)
MCHC RBC AUTO-ENTMCNC: 33.8 G/DL (ref 31.5–36.5)
MCV RBC AUTO: 92 FL (ref 78–100)
PLATELET # BLD AUTO: 227 10E3/UL (ref 150–450)
POTASSIUM SERPL-SCNC: 4.2 MMOL/L (ref 3.4–5.3)
PROT SERPL-MCNC: 7.5 G/DL (ref 6.4–8.3)
RBC # BLD AUTO: 5.02 10E6/UL (ref 4.4–5.9)
SODIUM SERPL-SCNC: 141 MMOL/L (ref 135–145)
TSH SERPL DL<=0.005 MIU/L-ACNC: 2.38 UIU/ML (ref 0.3–4.2)
VIT B12 SERPL-MCNC: 954 PG/ML (ref 232–1245)
VIT D+METAB SERPL-MCNC: 44 NG/ML (ref 20–50)
WBC # BLD AUTO: 6.2 10E3/UL (ref 4–11)

## 2023-12-27 PROCEDURE — 80053 COMPREHEN METABOLIC PANEL: CPT | Performed by: INTERNAL MEDICINE

## 2023-12-27 PROCEDURE — 36415 COLL VENOUS BLD VENIPUNCTURE: CPT | Performed by: INTERNAL MEDICINE

## 2023-12-27 PROCEDURE — 86140 C-REACTIVE PROTEIN: CPT | Performed by: INTERNAL MEDICINE

## 2023-12-27 PROCEDURE — 83550 IRON BINDING TEST: CPT | Performed by: INTERNAL MEDICINE

## 2023-12-27 PROCEDURE — 85027 COMPLETE CBC AUTOMATED: CPT | Performed by: INTERNAL MEDICINE

## 2023-12-27 PROCEDURE — 82306 VITAMIN D 25 HYDROXY: CPT | Performed by: INTERNAL MEDICINE

## 2023-12-27 PROCEDURE — 82746 ASSAY OF FOLIC ACID SERUM: CPT | Performed by: INTERNAL MEDICINE

## 2023-12-27 PROCEDURE — 82728 ASSAY OF FERRITIN: CPT | Performed by: INTERNAL MEDICINE

## 2023-12-27 PROCEDURE — 84443 ASSAY THYROID STIM HORMONE: CPT | Performed by: INTERNAL MEDICINE

## 2023-12-27 PROCEDURE — 82607 VITAMIN B-12: CPT | Performed by: INTERNAL MEDICINE

## 2023-12-27 PROCEDURE — 99214 OFFICE O/P EST MOD 30 MIN: CPT | Performed by: INTERNAL MEDICINE

## 2023-12-27 PROCEDURE — 85652 RBC SED RATE AUTOMATED: CPT | Performed by: INTERNAL MEDICINE

## 2023-12-27 PROCEDURE — 83540 ASSAY OF IRON: CPT | Performed by: INTERNAL MEDICINE

## 2023-12-27 ASSESSMENT — PAIN SCALES - GENERAL: PAINLEVEL: NO PAIN (0)

## 2023-12-27 NOTE — PROGRESS NOTES
GI CLINIC VISIT    CC/REFERRING MD:  Ruby Beck  REASON FOR CONSULTATION:   Ruby Beck for   Chief Complaint   Patient presents with    Follow Up     6 month follow up for Chron's disease with constipation, abdominal bloating and burning sensation around the abdomen.         VERN Millan presents today for follow-up of crohns.  Past few days has been struggling more with constipation - lately has noticed he has been going longer periods without having these episodes.  Has been using linzess but so far without good results.  Feeling the urge to go 5-6 times a day but nothing actually passes.  Struggling more with fatigue - has to pace himself.  Also been having more headaches lately - using rare Fioricet and relpax - no NSAIDs. Planning to see neurology in January.    No issues with humira - received letter that prior auth is needed again.     IBD History:  Time of diagnosis: 2018  Extent of disease: ileocolonic  Phenotype: stricturing  Previous therapies: humira at [facility] - now receiving weekly  EIM: joint pains  Previous surgeries: none for crohns, did have nissen in the past  Most recent endoscopy: 2022  Perianal skin tags found on perianal exam.                             - The examined portion of the ileum was normal.                             - Scarring with distortion of the IC valve and the                             cecum was found. The IC valve was wide open and                             passed easily with the pediatric colonoscope. There                             was some granularity and small pseudopolyps at the                             IC valve but no active inflammation. . Biopsied.                             - A single ulcer in the transverse colon. Biopsied.                             - One 2 mm polyp in the ascending colon, removed                             with a cold biopsy forceps. Resected and retrieved.                             - One 3 mm polyp in the sigmoid colon,  removed with                             a cold biopsy forceps. Resected and retrieved.                             - Diverticulosis in the sigmoid colon.                             - Simple Endoscopic Score for Crohn's Disease: 3,                             mucosal inflammatory changes secondary to Crohn's                             disease. Biopsied.                             Overall there is minimal evidence of any Crohn's                             Disease activity in the colon. One small 4 mm                             ulcer. Otherwise no activity.   Impression:               - Normal esophagus.                             - Z-line regular, 43 cm from the incisors.                             - A Nissen fundoplication was found. The wrap                             appears intact.                             - A patch of friable gastric mucosa in the distal                             gastric body. Biopsied.                             - Normal examined duodenum. Biopsied.                             Overall unremarkable exam. Mild friability in the                             gastric body was biopsied. Can consider trial of                             omeprazole 40 mg daily for 2 months to see if this                             offers any benefit to chronic epigastric/LUQ pain.      A.  Duodenum: Biopsy:  - Duodenal mucosa within normal limits  - Normal villous architecture with no increase in intraepithelial lymphocytes      B.  Stomach: Biopsy:  - Oxyntic type mucosa with mucosal erosion and reactive epithelial changes  - Antral type mucosa within normal limits  - Immunostain for Helicobacter pylori is in process, with the results to be reported in an addendum      C.  Ileocecal valve: Biopsy:  - Chronic, focally active colitis with architectural disarray, basal plasmacytosis, and focal active cryptitis  - No dysplasia     D.  Colon, ascending: Polypectomy:  - Tubular adenoma  - No evidence of  high-grade dysplasia or invasive malignancy      E.  Colon, cecum/ascending: Biopsy:  - Colonic mucosa within normal limits  - No active or chronic colitis  - No dysplasia      F.  Colon, transverse: Biopsy:  - Colonic mucosa within normal limits  - No active or chronic colitis  - No dysplasia      G.  Colon, transverse, ulcer: Biopsy:  - Colonic mucosa with lamina propria hyalinization, withered crypts, and focal active inflammation, see comment     H.  Colon, descending/sigmoid: Biopsy:  - Colonic mucosa within normal limits  - No active or chronic colitis  - No dysplasia       I.  Colon, sigmoid: Polypectomy:  - Tubular adenoma  - No evidence of high-grade dysplasia or invasive malignancy       J.  Rectum: Biopsy:  - Colonic mucosa within normal limits  - No active or chronic colitis  - No dysplasia      ROS:    No fevers or chills  No weight loss  No blurry vision, double vision or change in vision  No sore throat  No lymphadenopathy  No headache, paraesthesias, or weakness in a limb  No shortness of breath or wheezing  No chest pain or pressure  No arthralgias or myalgias  No rashes or skin changes  No odynophagia or dysphagia  No BRBPR, hematochezia, melena  No dysuria, frequency or urgency  No hot/cold intolerance or polyria  No anxiety or depression    PROBLEM LIST  Patient Active Problem List    Diagnosis Date Noted    GERD (gastroesophageal reflux disease) 05/04/2023     Priority: Medium    History of pneumonia 05/04/2023     Priority: Medium    History of irritable bowel syndrome 05/04/2023     Priority: Medium    Insufficient sleep syndrome 10/01/2021     Priority: Medium    Hyperglycemia 06/02/2019     Priority: Medium    Crohn's disease of colon with complication (H) 01/04/2019     Priority: Medium    Chronic bursitis of left shoulder 09/26/2018     Priority: Medium    Allergic rhinitis 09/30/2017     Priority: Medium    Fatigue 03/20/2017     Priority: Medium    Dysphagia, unspecified 10/13/2015      Priority: Medium    ED (erectile dysfunction) 09/19/2015     Priority: Medium    Hypogonadism male 09/19/2015     Priority: Medium    CAROLE on CPAP 09/19/2015     Priority: Medium    Swallowing dysfunction 09/19/2015     Priority: Medium    Ventricular arrhythmia 09/19/2015     Priority: Medium    Vitamin D deficiency 09/19/2015     Priority: Medium    Bursitis, infrapatellar or subpatellar 08/14/2015     Priority: Medium    Tendonitis of ankle, left 08/14/2015     Priority: Medium    Cough, persistent 05/18/2015     Priority: Medium    Hearing loss on left 05/18/2015     Priority: Medium    Abnormal MRI of head 12/31/2014     Priority: Medium    Migraine headache 12/31/2014     Priority: Medium    Peripheral vestibulopathy 03/14/2014     Priority: Medium     Formatting of this note might be different from the original.  Left ear, symptoms resolved with vestibular rehab         PERTINENT PAST MEDICAL HISTORY:  Past Medical History:   Diagnosis Date    Crohn's disease (H) 2018    Dysplastic nevus     ED (erectile dysfunction)     Fatty liver     Gastroesophageal reflux disease with esophagitis     Migraine     Plantar fasciitis     PONV (postoperative nausea and vomiting)     PVC's (premature ventricular contractions)     Seasonal allergic rhinitis     Sleep apnea        PREVIOUS SURGERIES:  Past Surgical History:   Procedure Laterality Date    COLONOSCOPY N/A 6/22/2022    Procedure: COLONOSCOPY, WITH POLYPECTOMY AND BIOPSY;  Surgeon: Ariel Drew MD;  Location:  GI    ESOPHAGOSCOPY, GASTROSCOPY, DUODENOSCOPY (EGD), COMBINED N/A 6/22/2022    Procedure: ESOPHAGOGASTRODUODENOSCOPY, WITH BIOPSY;  Surgeon: Ariel Drew MD;  Location:  GI    NISSEN FUNDOPLICATION N/A 2016    x 2           ALLERGIES:   No Known Allergies    PERTINENT MEDICATIONS:    Current Outpatient Medications:     acetaminophen (TYLENOL) 500 MG tablet, Take 500 mg by mouth, Disp: , Rfl:     adalimumab (HUMIRA *CF* PEN) 40  MG/0.4ML pen kit, Inject 0.4 mLs (40 mg) Subcutaneous every 7 days, Disp: 4 each, Rfl: 11    azelastine-fluticasone (DYMISTA) 137-50 MCG/ACT nasal spray, Spray 1 spray in nostril, Disp: , Rfl:     Butalbital-Acetaminophen (PHRENILIN)  MG TABS per tablet, , Disp: , Rfl:     cholecalciferol 25 MCG (1000 UT) TABS, Take 2,000 Units by mouth, Disp: , Rfl:     cyanocobalamin (VITAMIN B-12) 1000 MCG tablet, Take 1,000 mcg by mouth daily, Disp: , Rfl:     diclofenac (VOLTAREN) 1 % topical gel, Apply 2 g topically 4 times daily, Disp: 100 g, Rfl: 11    dicyclomine (BENTYL) 10 MG capsule, Take 1 capsule (10 mg) by mouth 4 times daily as needed (abdominal pain), Disp: 270 capsule, Rfl: 3    eletriptan (RELPAX) 20 MG tablet, Take 20 mg by mouth at onset of headache , Disp: , Rfl:     famotidine (PEPCID) 40 MG tablet, TAKE 1 TABLET NIGHTLY AS NEEDED FOR HEARTBURN, Disp: 30 tablet, Rfl: 11    fexofenadine (ALLEGRA) 180 MG tablet, Take 180 mg by mouth, Disp: , Rfl:     fluticasone (FLONASE) 50 MCG/ACT nasal spray, 1 spray, Disp: , Rfl:     hydrocortisone (ANUSOL-HC) 25 MG suppository, Place 1 suppository (25 mg) rectally 2 times daily as needed for hemorrhoids, Disp: 24 suppository, Rfl: 3    hyoscyamine ER (LEVBID) 375 mcg 12 hr tablet, Take 0.375 mg by mouth every 12 hours as needed , Disp: , Rfl:     metoprolol succinate ER (TOPROL XL) 100 MG 24 hr tablet, Take 1 tablet by mouth daily, Disp: , Rfl:     ondansetron (ZOFRAN-ODT) 4 MG ODT tab, Take 4 mg by mouth every 6 hours as needed , Disp: , Rfl:     Peppermint Oil (IBGARD PO), Take 180 mg by mouth daily, Disp: , Rfl:     tadalafil (CIALIS) 20 MG tablet, Take 20 mg by mouth, Disp: , Rfl:     SOCIAL HISTORY:  Social History     Socioeconomic History    Marital status:      Spouse name: Not on file    Number of children: Not on file    Years of education: Not on file    Highest education level: Not on file   Occupational History    Not on file   Tobacco Use     "Smoking status: Never    Smokeless tobacco: Never   Vaping Use    Vaping Use: Never used   Substance and Sexual Activity    Alcohol use: Never    Drug use: Not on file    Sexual activity: Yes     Partners: Female   Other Topics Concern    Not on file   Social History Narrative    Not on file     Social Determinants of Health     Financial Resource Strain: Not on file   Food Insecurity: Not on file   Transportation Needs: Not on file   Physical Activity: Not on file   Stress: Not on file   Social Connections: Not on file   Interpersonal Safety: Not on file   Housing Stability: Not on file       FAMILY HISTORY:  Family History   Problem Relation Age of Onset    Diabetes Maternal Grandfather     Coronary Artery Disease Paternal Grandfather        Past/family/social history reviewed and no changes    PHYSICAL EXAMINATION:  Constitutional: aaox3, cooperative, pleasant, not dyspneic/diaphoretic, no acute distress  Vitals reviewed: /82 (BP Location: Left arm, Patient Position: Sitting, Cuff Size: Adult Regular)   Pulse 64   Ht 1.854 m (6' 1\")   Wt 107.9 kg (237 lb 12.8 oz)   SpO2 98%   BMI 31.37 kg/m    Wt:   Wt Readings from Last 2 Encounters:   12/27/23 107.9 kg (237 lb 12.8 oz)   05/24/23 106.8 kg (235 lb 8 oz)      Eyes: Sclera anicteric/injected  Ears/nose/mouth/throat: Normal oropharynx without ulcers or exudate, mucus membranes moist, hearing intact  Neck: supple, thyroid normal size  CV: No edema  Respiratory: Unlabored breathing  Lymph: No axillary, submandibular, supraclavicular or inguinal lymphadenopathy  Abd: soft, mildly distended, no hepatosplenomegaly, nontender, no peritoneal signs  Skin: warm, perfused, no jaundice  Psych: Normal affect  MSK: Normal gait      PERTINENT STUDIES:  Most recent CBC:  Recent Labs   Lab Test 04/07/23  1319 04/22/21  1337   WBC 5.8 7.3   HGB 15.2 15.8   HCT 44.3 47.3    265     Most recent hepatic panel:  Recent Labs   Lab Test 04/07/23  1319 04/22/21  1337 "   ALT 54* 76*   AST 40 40     Most recent creatinine:  Recent Labs   Lab Test 04/07/23  1319 04/22/21  1337   CR 0.81 0.79       ASSESSMENT/PLAN:    # crohns ileocolitis - still with alternating diarrhea/constipation - did seem to be improved somewhat except for the past few days has been struggling more with constipation.  Will continue daily linzess and add miralax until symptoms resolve.  Once they do - return to fiber supplements with as needed miralax.  Continue humira.  Is due for labs today - will also check a fecal calprotectin    # fatigue - will check vitamin levels and thyroid function.    # headaches - will be seeing neurology    Vaccinations:  -- Influenza (every year): got this year  -- TdaP (every 10 years): received last year  -- Pneumococcal Pneumonia (once then every 5 years): Last given will address at next visit - received prevnar in 2019 - will discuss pneumovax  -- Yearly assessment for latent Tb (verbal screening and exam, PPD or QuantiFERON-Tb testing): Last obtained 2018 negative  - Covid vaccine - received 2021     One time confirmation of immunity or serologies:  -- Hepatitis A (serologies or immunizations): will discuss vaccine at next visit  -- Hepatitis B (serologies or immunizations): negative in 2018 - will discuss vaccine at next visit  -- MMR: will address at next visit  -- HPV (all aged 18-26): n/a  -- Meningococcal meningitis (all patients at risk for meningitis): N/a  -- Due to the immunosuppression in this patient, I would not advise administration of live vaccines such as varicella/VZV, intranasal influenza, MMR, or yellow fever vaccine (if travelling).       Bone mineral density screening   -- Recommend all patients supplement with calcium and vitamin D  -- Given prior steroid use recommend DEXA if not already done     Cancer Screening:  Colon cancer screening:     Skin cancer screening: Annual visual exam of skin by dermatologist since patient is immunocompromised - discussed  with patient today given his history - referral placed to dermatology     Depression Screening:        Misc:  -- Avoid tobacco use  -- Avoid NSAIDs as there is potentially a 25% chance of causing an IBD flare    RTC 6-12 months or sooner if needed    Ruby Beck, DO

## 2023-12-27 NOTE — TELEPHONE ENCOUNTER
PA Initiation    Medication: HUMIRA *CF* PEN 40 MG/0.4ML SC PNKT  Insurance Company: [employer] - Phone [phone] Fax [phone]  Pharmacy Filling the Rx:    Filling Pharmacy Phone:    Filling Pharmacy Fax:    Start Date: 12/27/2023  [id number]

## 2023-12-27 NOTE — TELEPHONE ENCOUNTER
Prior Authorization Specialty Medication Request    Medication/Dose: Humira  Diagnosis and ICD code (if different than what is on RX):    New/renewal/insurance change PA/secondary ins. PA:  Previously Tried and Failed:      Important Lab Values:   Rationale:     Insurance   Primary:   Insurance ID:      Secondary (if applicable):  Insurance ID:      Pharmacy Information (if different than what is on RX)  Name:    Phone:    Fax:

## 2023-12-27 NOTE — PATIENT INSTRUCTIONS
Start taking miralax 1-2 times a day. Continue linzess as well until your constipation starts to improve.    Take a dose of miralax in the evening if you haven't had a good bowel movement that day  Please have labs done and submit a stool sample.

## 2023-12-27 NOTE — LETTER
12/27/2023         RE: Monty Fleming  [address]  [facility] [address]        Dear Colleague,    Thank you for referring your patient, Monty Fleming, to the [facility]. Please see a copy of my visit note below.    GI CLINIC VISIT    CC/REFERRING MD:  Ruby Beck  REASON FOR CONSULTATION:   Ruby Beck for   Chief Complaint   Patient presents with     Follow Up     6 month follow up for Chron's disease with constipation, abdominal bloating and burning sensation around the abdomen.         HPI  Monty presents today for follow-up of crohns.  Past few days has been struggling more with constipation - lately has noticed he has been going longer periods without having these episodes.  Has been using linzess but so far without good results.  Feeling the urge to go 5-6 times a day but nothing actually passes.  Struggling more with fatigue - has to pace himself.  Also been having more headaches lately - using rare Fioricet and relpax - no NSAIDs. Planning to see neurology in January.    No issues with humira - received letter that prior auth is needed again.     IBD History:  Time of diagnosis: 2018  Extent of disease: ileocolonic  Phenotype: stricturing  Previous therapies: humira at [facility] - now receiving weekly  EIM: joint pains  Previous surgeries: none for crohns, did have nissen in the past  Most recent endoscopy: 2022  Perianal skin tags found on perianal exam.                             - The examined portion of the ileum was normal.                             - Scarring with distortion of the IC valve and the                             cecum was found. The IC valve was wide open and                             passed easily with the pediatric colonoscope. There                             was some granularity and small pseudopolyps at the                             IC valve but no active inflammation. . Biopsied.                             - A single ulcer in the  transverse colon. Biopsied.                             - One 2 mm polyp in the ascending colon, removed                             with a cold biopsy forceps. Resected and retrieved.                             - One 3 mm polyp in the sigmoid colon, removed with                             a cold biopsy forceps. Resected and retrieved.                             - Diverticulosis in the sigmoid colon.                             - Simple Endoscopic Score for Crohn's Disease: 3,                             mucosal inflammatory changes secondary to Crohn's                             disease. Biopsied.                             Overall there is minimal evidence of any Crohn's                             Disease activity in the colon. One small 4 mm                             ulcer. Otherwise no activity.   Impression:               - Normal esophagus.                             - Z-line regular, 43 cm from the incisors.                             - A Nissen fundoplication was found. The wrap                             appears intact.                             - A patch of friable gastric mucosa in the distal                             gastric body. Biopsied.                             - Normal examined duodenum. Biopsied.                             Overall unremarkable exam. Mild friability in the                             gastric body was biopsied. Can consider trial of                             omeprazole 40 mg daily for 2 months to see if this                             offers any benefit to chronic epigastric/LUQ pain.      A.  Duodenum: Biopsy:  - Duodenal mucosa within normal limits  - Normal villous architecture with no increase in intraepithelial lymphocytes      B.  Stomach: Biopsy:  - Oxyntic type mucosa with mucosal erosion and reactive epithelial changes  - Antral type mucosa within normal limits  - Immunostain for Helicobacter pylori is in process, with the results to be reported in an  addendum      C.  Ileocecal valve: Biopsy:  - Chronic, focally active colitis with architectural disarray, basal plasmacytosis, and focal active cryptitis  - No dysplasia     D.  Colon, ascending: Polypectomy:  - Tubular adenoma  - No evidence of high-grade dysplasia or invasive malignancy      E.  Colon, cecum/ascending: Biopsy:  - Colonic mucosa within normal limits  - No active or chronic colitis  - No dysplasia      F.  Colon, transverse: Biopsy:  - Colonic mucosa within normal limits  - No active or chronic colitis  - No dysplasia      G.  Colon, transverse, ulcer: Biopsy:  - Colonic mucosa with lamina propria hyalinization, withered crypts, and focal active inflammation, see comment     H.  Colon, descending/sigmoid: Biopsy:  - Colonic mucosa within normal limits  - No active or chronic colitis  - No dysplasia       I.  Colon, sigmoid: Polypectomy:  - Tubular adenoma  - No evidence of high-grade dysplasia or invasive malignancy       J.  Rectum: Biopsy:  - Colonic mucosa within normal limits  - No active or chronic colitis  - No dysplasia      ROS:    No fevers or chills  No weight loss  No blurry vision, double vision or change in vision  No sore throat  No lymphadenopathy  No headache, paraesthesias, or weakness in a limb  No shortness of breath or wheezing  No chest pain or pressure  No arthralgias or myalgias  No rashes or skin changes  No odynophagia or dysphagia  No BRBPR, hematochezia, melena  No dysuria, frequency or urgency  No hot/cold intolerance or polyria  No anxiety or depression    PROBLEM LIST  Patient Active Problem List    Diagnosis Date Noted     GERD (gastroesophageal reflux disease) 05/04/2023     Priority: Medium     History of pneumonia 05/04/2023     Priority: Medium     History of irritable bowel syndrome 05/04/2023     Priority: Medium     Insufficient sleep syndrome 10/01/2021     Priority: Medium     Hyperglycemia 06/02/2019     Priority: Medium     Crohn's disease of colon with  complication (H) 01/04/2019     Priority: Medium     Chronic bursitis of left shoulder 09/26/2018     Priority: Medium     Allergic rhinitis 09/30/2017     Priority: Medium     Fatigue 03/20/2017     Priority: Medium     Dysphagia, unspecified 10/13/2015     Priority: Medium     ED (erectile dysfunction) 09/19/2015     Priority: Medium     Hypogonadism male 09/19/2015     Priority: Medium     CAROLE on CPAP 09/19/2015     Priority: Medium     Swallowing dysfunction 09/19/2015     Priority: Medium     Ventricular arrhythmia 09/19/2015     Priority: Medium     Vitamin D deficiency 09/19/2015     Priority: Medium     Bursitis, infrapatellar or subpatellar 08/14/2015     Priority: Medium     Tendonitis of ankle, left 08/14/2015     Priority: Medium     Cough, persistent 05/18/2015     Priority: Medium     Hearing loss on left 05/18/2015     Priority: Medium     Abnormal MRI of head 12/31/2014     Priority: Medium     Migraine headache 12/31/2014     Priority: Medium     Peripheral vestibulopathy 03/14/2014     Priority: Medium     Formatting of this note might be different from the original.  Left ear, symptoms resolved with vestibular rehab         PERTINENT PAST MEDICAL HISTORY:  Past Medical History:   Diagnosis Date     Crohn's disease (H) 2018     Dysplastic nevus      ED (erectile dysfunction)      Fatty liver      Gastroesophageal reflux disease with esophagitis      Migraine      Plantar fasciitis      PONV (postoperative nausea and vomiting)      PVC's (premature ventricular contractions)      Seasonal allergic rhinitis      Sleep apnea        PREVIOUS SURGERIES:  Past Surgical History:   Procedure Laterality Date     COLONOSCOPY N/A 6/22/2022    Procedure: COLONOSCOPY, WITH POLYPECTOMY AND BIOPSY;  Surgeon: Ariel Drew MD;  Location:  GI     ESOPHAGOSCOPY, GASTROSCOPY, DUODENOSCOPY (EGD), COMBINED N/A 6/22/2022    Procedure: ESOPHAGOGASTRODUODENOSCOPY, WITH BIOPSY;  Surgeon: Ariel Drew  MD Chapin;  Location:  GI     NISSEN FUNDOPLICATION N/A 2016    x 2           ALLERGIES:   No Known Allergies    PERTINENT MEDICATIONS:    Current Outpatient Medications:      acetaminophen (TYLENOL) 500 MG tablet, Take 500 mg by mouth, Disp: , Rfl:      adalimumab (HUMIRA *CF* PEN) 40 MG/0.4ML pen kit, Inject 0.4 mLs (40 mg) Subcutaneous every 7 days, Disp: 4 each, Rfl: 11     azelastine-fluticasone (DYMISTA) 137-50 MCG/ACT nasal spray, Spray 1 spray in nostril, Disp: , Rfl:      Butalbital-Acetaminophen (PHRENILIN)  MG TABS per tablet, , Disp: , Rfl:      cholecalciferol 25 MCG (1000 UT) TABS, Take 2,000 Units by mouth, Disp: , Rfl:      cyanocobalamin (VITAMIN B-12) 1000 MCG tablet, Take 1,000 mcg by mouth daily, Disp: , Rfl:      diclofenac (VOLTAREN) 1 % topical gel, Apply 2 g topically 4 times daily, Disp: 100 g, Rfl: 11     dicyclomine (BENTYL) 10 MG capsule, Take 1 capsule (10 mg) by mouth 4 times daily as needed (abdominal pain), Disp: 270 capsule, Rfl: 3     eletriptan (RELPAX) 20 MG tablet, Take 20 mg by mouth at onset of headache , Disp: , Rfl:      famotidine (PEPCID) 40 MG tablet, TAKE 1 TABLET NIGHTLY AS NEEDED FOR HEARTBURN, Disp: 30 tablet, Rfl: 11     fexofenadine (ALLEGRA) 180 MG tablet, Take 180 mg by mouth, Disp: , Rfl:      fluticasone (FLONASE) 50 MCG/ACT nasal spray, 1 spray, Disp: , Rfl:      hydrocortisone (ANUSOL-HC) 25 MG suppository, Place 1 suppository (25 mg) rectally 2 times daily as needed for hemorrhoids, Disp: 24 suppository, Rfl: 3     hyoscyamine ER (LEVBID) 375 mcg 12 hr tablet, Take 0.375 mg by mouth every 12 hours as needed , Disp: , Rfl:      metoprolol succinate ER (TOPROL XL) 100 MG 24 hr tablet, Take 1 tablet by mouth daily, Disp: , Rfl:      ondansetron (ZOFRAN-ODT) 4 MG ODT tab, Take 4 mg by mouth every 6 hours as needed , Disp: , Rfl:      Peppermint Oil (IBGARD PO), Take 180 mg by mouth daily, Disp: , Rfl:      tadalafil (CIALIS) 20 MG tablet, Take 20 mg by  "mouth, Disp: , Rfl:     SOCIAL HISTORY:  Social History     Socioeconomic History     Marital status:      Spouse name: Not on file     Number of children: Not on file     Years of education: Not on file     Highest education level: Not on file   Occupational History     Not on file   Tobacco Use     Smoking status: Never     Smokeless tobacco: Never   Vaping Use     Vaping Use: Never used   Substance and Sexual Activity     Alcohol use: Never     Drug use: Not on file     Sexual activity: Yes     Partners: Female   Other Topics Concern     Not on file   Social History Narrative     Not on file     Social Determinants of Health     Financial Resource Strain: Not on file   Food Insecurity: Not on file   Transportation Needs: Not on file   Physical Activity: Not on file   Stress: Not on file   Social Connections: Not on file   Interpersonal Safety: Not on file   Housing Stability: Not on file       FAMILY HISTORY:  Family History   Problem Relation Age of Onset     Diabetes Maternal Grandfather      Coronary Artery Disease Paternal Grandfather        Past/family/social history reviewed and no changes    PHYSICAL EXAMINATION:  Constitutional: aaox3, cooperative, pleasant, not dyspneic/diaphoretic, no acute distress  Vitals reviewed: /82 (BP Location: Left arm, Patient Position: Sitting, Cuff Size: Adult Regular)   Pulse 64   Ht 1.854 m (6' 1\")   Wt 107.9 kg (237 lb 12.8 oz)   SpO2 98%   BMI 31.37 kg/m    Wt:   Wt Readings from Last 2 Encounters:   12/27/23 107.9 kg (237 lb 12.8 oz)   05/24/23 106.8 kg (235 lb 8 oz)      Eyes: Sclera anicteric/injected  Ears/nose/mouth/throat: Normal oropharynx without ulcers or exudate, mucus membranes moist, hearing intact  Neck: supple, thyroid normal size  CV: No edema  Respiratory: Unlabored breathing  Lymph: No axillary, submandibular, supraclavicular or inguinal lymphadenopathy  Abd: soft, mildly distended, no hepatosplenomegaly, nontender, no peritoneal " signs  Skin: warm, perfused, no jaundice  Psych: Normal affect  MSK: Normal gait      PERTINENT STUDIES:  Most recent CBC:  Recent Labs   Lab Test 04/07/23  1319 04/22/21  1337   WBC 5.8 7.3   HGB 15.2 15.8   HCT 44.3 47.3    265     Most recent hepatic panel:  Recent Labs   Lab Test 04/07/23  1319 04/22/21  1337   ALT 54* 76*   AST 40 40     Most recent creatinine:  Recent Labs   Lab Test 04/07/23  1319 04/22/21  1337   CR 0.81 0.79       ASSESSMENT/PLAN:    # crohns ileocolitis - still with alternating diarrhea/constipation - did seem to be improved somewhat except for the past few days has been struggling more with constipation.  Will continue daily linzess and add miralax until symptoms resolve.  Once they do - return to fiber supplements with as needed miralax.  Continue humira.  Is due for labs today - will also check a fecal calprotectin    # fatigue - will check vitamin levels and thyroid function.    # headaches - will be seeing neurology    Vaccinations:  -- Influenza (every year): got this year  -- TdaP (every 10 years): received last year  -- Pneumococcal Pneumonia (once then every 5 years): Last given will address at next visit - received prevnar in 2019 - will discuss pneumovax  -- Yearly assessment for latent Tb (verbal screening and exam, PPD or QuantiFERON-Tb testing): Last obtained 2018 negative  - Covid vaccine - received 2021     One time confirmation of immunity or serologies:  -- Hepatitis A (serologies or immunizations): will discuss vaccine at next visit  -- Hepatitis B (serologies or immunizations): negative in 2018 - will discuss vaccine at next visit  -- MMR: will address at next visit  -- HPV (all aged 18-26): n/a  -- Meningococcal meningitis (all patients at risk for meningitis): N/a  -- Due to the immunosuppression in this patient, I would not advise administration of live vaccines such as varicella/VZV, intranasal influenza, MMR, or yellow fever vaccine (if travelling).        Bone mineral density screening   -- Recommend all patients supplement with calcium and vitamin D  -- Given prior steroid use recommend DEXA if not already done     Cancer Screening:  Colon cancer screening:     Skin cancer screening: Annual visual exam of skin by dermatologist since patient is immunocompromised - discussed with patient today given his history - referral placed to dermatology     Depression Screening:        Misc:  -- Avoid tobacco use  -- Avoid NSAIDs as there is potentially a 25% chance of causing an IBD flare    RTC 6-12 months or sooner if needed    Ruby Beck, DO            Again, thank you for allowing me to participate in the care of your patient.        Sincerely,        Ruby Beck DO

## 2023-12-29 NOTE — TELEPHONE ENCOUNTER
Prior Authorization Approval    Medication: HUMIRA *CF* PEN 40 MG/0.4ML SC PNKT  Authorization Effective Date: 11/27/2023  Authorization Expiration Date: 12/26/2024  Approved Dose/Quantity: 4/28  Reference #: [id number]   Insurance Company: [employer] - Phone [phone] Fax [phone]  Expected CoPay: $    CoPay Card Available:      Financial Assistance Needed:    Which Pharm[facility] is filling the prescription: [facility] - [address] [place] [address]  Pharmacy Notified:    Patient Notified:  sent [facility] message

## 2024-02-17 ENCOUNTER — TRANSCRIBE ORDERS (OUTPATIENT)
Dept: OTHER | Age: 49
End: 2024-02-17

## 2024-02-17 DIAGNOSIS — R93.0 ABNORMAL MRI OF HEAD: Primary | ICD-10-CM

## 2024-02-20 ENCOUNTER — PATIENT OUTREACH (OUTPATIENT)
Dept: ONCOLOGY | Facility: CLINIC | Age: 49
End: 2024-02-20
Payer: COMMERCIAL

## 2024-02-20 ENCOUNTER — PRE VISIT (OUTPATIENT)
Dept: ONCOLOGY | Facility: CLINIC | Age: 49
End: 2024-02-20
Payer: COMMERCIAL

## 2024-02-20 NOTE — TELEPHONE ENCOUNTER
RECORDS STATUS - ALL OTHER DIAGNOSIS      Action    Action Taken 2/20/24  Imaging from [facility] requested.    Imaging resolved  10:54 AM    Spoke w/ Vangie @ [facility] - advised that studies for 1/8/15 & 4/7/14 have been purged from their system, confirmed by [facility] PACS team admin.    Spoke w/ pt - pt advised they only had an imaging disc for the 1/8/15 study. Pt advised they do not have a copy of the 4/7/14 study. Writer advised pt that [facility] purged the imaging, which is why writer contacted pt for disc.     Pt advised disc is currently @ [facility]. Pt advised they will be picking up their disc tomorrow as they have an  MRI SPINE scheduled @ [facility] around 7am.    Writer advised pt that writer would reach out to [facility] to see if they would be willing/able to push over OOS MRI 1/8/18 ([facility]).    Spoke w/ [facility]iology - they have 1/8/15 image uploaded to their system, and will push shortly.     Writer spoke w/ patient again, pt advised only imaging conducted for this condition has been at [facility] & [facility].    Imaging from [facility] via [facility] received, resolved to PACS.  12:22 PM    2/23/24  Spoke w/ [facility] MRI - they will push 2/21/24 MRI's of the spine shortly.     Imaging resolved to PACS  8:25 AM      RECORDS RECEIVED FROM: [facility]   DATE RECEIVED:    NOTES STATUS DETAILS   OFFICE NOTE from referring provider CE - [facility] Tigist Strickland APRN: 2/7/24   OFFICE NOTE from medical oncologist     DISCHARGE SUMMARY from hospital     DISCHARGE REPORT from the ER     OPERATIVE REPORT     MEDICATION LIST     CLINICAL TRIAL TREATMENTS TO DATE     LABS     PATHOLOGY REPORTS     ANYTHING RELATED TO DIAGNOSIS Epic/CE 12/27/23   GENONOMIC TESTING     TYPE:     IMAGING (NEED IMAGES & REPORT)     CT SCANS PACS  2/21/24, 2/14/24: [facility]    1/8/15: [facility]   MRI     MAMMO     ULTRASOUND     PET

## 2024-02-20 NOTE — PROGRESS NOTES
"New Patient Hematology / Oncology Nurse Navigator Note     Referral Date: 2/19/24    Referring provider:   Tigist Strickland, APRN, CNP   [address] [place] [address]   Phone: [phone]   Fax: [phone]     Referring Clinic/Organization: [facility]      Referred to: Neuro Oncology    Requested provider (if applicable): Dr. Jones    Evaluation for : \"2nd opinion on MRI brain   Demyelination vs neoplasm \"     Clinical History (per Nurse review of records provided):    2/7/24 Office Visit with Neurology at [facility]--BOOKMARKED  Note from Neurology:  \"Spoke with Monty and discussed his MRI brain results. He is going to bring in a CD from 2015 from a previous MRI brain so we can compare the findings. Also discussed obtaining imaging of his spine as well to look for any demyelinating lesions. Will also send a a referral for a second opinion to [facility]- neuro-oncology department for evaluation. \" -- BOOKMARKED  2/14/24 Brain MRI ([facility]):  \"Impression  9 mm rounded T2 hyperintense T1 hypointense focus signal abnormality in the posterior left juxta callosal region without associated enhancement or mass effect. The location and morphology is suspicious for demyelination. Gliosis is also possible, but considered less likely.  Indolent neoplasm is additional unlikely consideration. Correlation with prior examinations would be helpful.\"-- BOOKMARKED  1/8/15 Brain MRI:  IMPRESSION:    Abnormal MRI of the brain which is only remarkable for a single area of increased T2 and decreased T1 signal in the left parietal periventricular white matter. This appears unchanged compared to the previous study dated 04/17/2014. In this age group this    could be on either a demyelinating or ischemic basis but based on the location of the lesion demyelinating appears more likely. Clinical correlation is recommended.   4/17/14 Brain MRI ([facility]):  IMPRESSION:    Abnormal MRI of the brain which is only remarkable for a single area of increased T2 and decreased T1 signal in the left corona radiata as described above. This has a nonspecific appearance and in this age group may be due to demyelination or chronic   ischemic change. Clinical correlation is recommended.  -- BOOKMARKED    Clinical Assessment / Barriers to Care (Per Nurse):  Pt lives in [place], [place]    Records Location: Care Everywhere     Records Needed:   Need images from 2015/2014 (previous brain MRIs) from [facility]    Additional testing needed prior to consult:   Pending input from team/review of imaging    Of note, [facility]     Referral updates and Plan:   OUTGOING CALL to pt:  Introduced  my role as nurse navigator with [facility] and that we have recd the referral for 2nd opinion on brain imaging from Neuro Oncology (Dr. Jones) at [facility].     Pt confirms they are aware of the referral and ready to schedule. Explained writer is still working to obtain all images, then will review with the team to advise on next steps. We will likely review this at Tumor Conference on Monday 2/26, then writer will follow-up at that time.     Patient reports understanding and agrees with plan.     Provided contact information if future questions arise.       Terri Allred, BSN, RN, PHN, OCN  Hematology/Oncology Nurse Navigator  [facility]  [phone]

## 2024-02-26 ENCOUNTER — TUMOR CONFERENCE (OUTPATIENT)
Dept: ONCOLOGY | Facility: CLINIC | Age: 49
End: 2024-02-26
Payer: COMMERCIAL

## 2024-02-26 DIAGNOSIS — G93.9 BRAIN LESION: Primary | ICD-10-CM

## 2024-02-26 DIAGNOSIS — R90.89 ABNORMAL BRAIN MRI: ICD-10-CM

## 2024-02-26 NOTE — PROGRESS NOTES
Contacted patient with update that we reviewed his MRI during Brain Tumor Conference today. Recommendation is for repeat imaging in May and to establish with Dr. Jones at that time and review imaging. Pt agrees with recommendations. IB to Dr. Jones for MRI order. Will arrange MRI/appointment with Dr. Jones in May 2024 as discussed.     Of note,   Patient's son is getting  in [place], patient will be gone from 5/29-6/13.   He is scheduled for MRI at  on 5/28 before he leaves.   He is scheduled to see Dr Jones 6/14 as soon as he is back.

## 2024-05-02 NOTE — TELEPHONE ENCOUNTER
RECORDS STATUS - ALL OTHER DIAGNOSIS      RECORDS RECEIVED FROM: [facility] - Please see Pre Visit 2/20 for previous gathering   DATE RECEIVED: 5/2

## 2024-05-22 ENCOUNTER — OFFICE VISIT (OUTPATIENT)
Dept: DERMATOLOGY | Facility: CLINIC | Age: 49
End: 2024-05-22
Payer: COMMERCIAL

## 2024-05-22 DIAGNOSIS — L81.4 SOLAR LENTIGO: Primary | ICD-10-CM

## 2024-05-22 DIAGNOSIS — L82.1 SEBORRHEIC KERATOSES: ICD-10-CM

## 2024-05-22 DIAGNOSIS — D18.01 CHERRY ANGIOMA: ICD-10-CM

## 2024-05-22 DIAGNOSIS — D84.9 IMMUNOSUPPRESSED STATUS (H): ICD-10-CM

## 2024-05-22 DIAGNOSIS — Z86.018 HISTORY OF DYSPLASTIC NEVUS: ICD-10-CM

## 2024-05-22 DIAGNOSIS — D22.9 MULTIPLE BENIGN NEVI: ICD-10-CM

## 2024-05-22 PROCEDURE — 99213 OFFICE O/P EST LOW 20 MIN: CPT | Performed by: PHYSICIAN ASSISTANT

## 2024-05-22 RX ORDER — AMITRIPTYLINE HYDROCHLORIDE 10 MG/1
10 TABLET ORAL DAILY
COMMUNITY
Start: 2024-02-07

## 2024-05-22 RX ORDER — FREMANEZUMAB-VFRM 225 MG/1.5ML
INJECTION SUBCUTANEOUS
COMMUNITY
Start: 2024-05-09

## 2024-05-22 NOTE — NURSING NOTE
Monty Fleming's goals for this visit include:   Chief Complaint   Patient presents with    Skin Check     Pt is here for a FBSC. R mid lower back, no other areas of concern.       He requests these members of his care team be copied on today's visit information:     PCP: Jaron Vines    Referring Provider:  Ruby Beck DO  [address] [place] [address]    There were no vitals taken for this visit.    Do you need any medication refills at today's visit?     Neisha Jacob LPN on 5/22/2024 at 10:37 AM

## 2024-05-22 NOTE — LETTER
5/22/2024         RE: Monty Fleming  [address]  [place] [address]        Dear Colleague,    Thank you for referring your patient, Monty Fleming, to the [facility]. Please see a copy of my visit note below.    [facility] Dermatology Note  Encounter Date: May 22, 2024  Office Visit      Dermatology Problem List:  FBSC: 5/22/24    1. Crohn's Disease - Humira since 2018  2. History of DN x2 - patient reported, records requested  - left calf, ~2017  - second DN ~ 2010     Social history: Lived in [place] and [place]. Moved to [place] for job.  ____________________________________________     Assessment & Plan:     # On Humira for Chron's Disease. Discussed increased risk of skin cancers with immunosuppressing medications.   - Recommend sunscreens SPF #30 or greater, protective clothing and avoidance of tanning beds.   - Recommend yearly skin exams.     # Hx DNs  - edu on increased melanoma risk  - continue annual skin exams    # Benign findings: multiple benign nevi, lentigines, cherry angiomas, SKs  - edu on benign etiology  - Signs and Symptoms of non-melanoma skin cancer and ABCDEs of melanoma reviewed with patient. Patient encouraged to perform monthly self skin exams and educated on how to perform them. UV precautions reviewed with patient. Patient was asked about new or changing moles/lesions on body.   - Sunscreen: Apply 20 minutes prior to going outdoors and reapply every two hours, when wet or sweating. We recommend using an SPF 30 or higher, and to use one that is water resistant.     - RTC for changes    Procedures Performed:   None    Follow-up: 1 year(s) in-person, or earlier for new or changing lesions    Staff and scribe:    Scribe Disclosure:   I, FAZAL FORRESTER, am serving as a scribe; to document services personally performed by Lorena Arias PA-C -based on data collection and the provider's statements to me.     Provider Disclosure:  I agree with  above History, Review of Systems, Physical exam and Plan.  I have reviewed the content of the documentation and have edited it as needed. I have personally performed the services documented here and the documentation accurately represents those services and the decisions I have made.      Electronically signed by:    All risks, benefits and alternatives were discussed with patient.  Patient is in agreement and understands the assessment and plan.  All questions were answered.    Lorena Arias PA-C, MPAS  [facility]: Phone: [phone], Fax: [phone]  [facility]: Phone: [phone],  Fax: [phone]  [facility]: Phone: [phone], Fax: [phone]  ____________________________________________    CC: Skin Check (Pt is here for a FBSC. R mid lower back, no other areas of concern.)      Reviewed patients past medical history and pertinent chart review prior to patient's visit today.     HPI:  Mr. Monty Fleming is a 48 year old male who presents today as a return patient for FBSC.     Today patient reported a spot of concern on his R lower back.     Has a hx of DN    Patient is otherwise feeling well, without additional concerns.    Labs:  N/A    Physical Exam:  Vitals: There were no vitals taken for this visit.  SKIN: Total skin excluding the undergarment areas was performed. The exam included the head/face, neck, both arms, chest, back, abdomen, both legs, digits and/or nails.    - - Quezada's skin type II, has <100 nevi  - There are dome shaped bright red papules on the trunk.   - Multiple regular brown pigmented macules and papules are identified on the trunk and extremities.   - Scattered brown macules on sun exposed areas.  - There are waxy stuck on tan to brown papules on the trunk.    - No other lesions of concern on areas examined.     Medications:  Current Outpatient Medications    Medication Sig Dispense Refill     acetaminophen (TYLENOL) 500 MG tablet Take 500 mg by mouth       adalimumab (HUMIRA *CF* PEN) 40 MG/0.4ML pen kit Inject 0.4 mLs (40 mg) Subcutaneous every 7 days 4 each 11     AJOVY 225 MG/1.5ML SOAJ Inject 1 subcutaneous injection per month       amitriptyline (ELAVIL) 10 MG tablet Take 10 mg by mouth daily       cholecalciferol 25 MCG (1000 UT) TABS Take 2,000 Units by mouth       cyanocobalamin (VITAMIN B-12) 1000 MCG tablet Take 1,000 mcg by mouth daily       diclofenac (VOLTAREN) 1 % topical gel Apply 2 g topically 4 times daily 100 g 11     dicyclomine (BENTYL) 10 MG capsule Take 1 capsule (10 mg) by mouth 4 times daily as needed (abdominal pain) 270 capsule 3     eletriptan (RELPAX) 20 MG tablet Take 20 mg by mouth at onset of headache        fexofenadine (ALLEGRA) 180 MG tablet Take 180 mg by mouth       fluticasone (FLONASE) 50 MCG/ACT nasal spray 1 spray       hyoscyamine ER (LEVBID) 375 mcg 12 hr tablet Take 0.375 mg by mouth every 12 hours as needed        ondansetron (ZOFRAN-ODT) 4 MG ODT tab Take 4 mg by mouth every 6 hours as needed        Peppermint Oil (IBGARD PO) Take 180 mg by mouth daily       tadalafil (CIALIS) 20 MG tablet Take 20 mg by mouth       azelastine-fluticasone (DYMISTA) 137-50 MCG/ACT nasal spray Spray 1 spray in nostril       Butalbital-Acetaminophen (PHRENILIN)  MG TABS per tablet  (Patient not taking: Reported on 5/22/2024)       famotidine (PEPCID) 40 MG tablet TAKE 1 TABLET NIGHTLY AS NEEDED FOR HEARTBURN (Patient not taking: Reported on 5/22/2024) 30 tablet 11     hydrocortisone (ANUSOL-HC) 25 MG suppository Place 1 suppository (25 mg) rectally 2 times daily as needed for hemorrhoids (Patient not taking: Reported on 5/22/2024) 24 suppository 3     metoprolol succinate ER (TOPROL XL) 100 MG 24 hr tablet Take 1 tablet by mouth daily       No current facility-administered medications for this visit.      Past Medical/Surgical History:    Patient Active Problem List   Diagnosis     Abnormal MRI of head     Allergic rhinitis     Bursitis, infrapatellar or subpatellar     Chronic bursitis of left shoulder     Cough, persistent     Crohn's disease of colon with complication (H)     ED (erectile dysfunction)     Dysphagia, unspecified     GERD (gastroesophageal reflux disease)     Fatigue     Hearing loss on left     History of pneumonia     History of irritable bowel syndrome     Hyperglycemia     Hypogonadism male     Insufficient sleep syndrome     Migraine headache     CAROLE on CPAP     Peripheral vestibulopathy     Swallowing dysfunction     Tendonitis of ankle, left     Ventricular arrhythmia     Vitamin D deficiency     Past Medical History:   Diagnosis Date     Crohn's disease (H) 2018     Dysplastic nevus      ED (erectile dysfunction)      Fatty liver      Gastroesophageal reflux disease with esophagitis      Migraine      Plantar fasciitis      PONV (postoperative nausea and vomiting)      PVC's (premature ventricular contractions)      Seasonal allergic rhinitis      Sleep apnea                         Again, thank you for allowing me to participate in the care of your patient.        Sincerely,        Lorena Arias PA-C

## 2024-05-22 NOTE — PROGRESS NOTES
[facility] Dermatology Note  Encounter Date: May 22, 2024  Office Visit      Dermatology Problem List:  FBSC: 5/22/24    1. Crohn's Disease - Humira since 2018  2. History of DN x2 - patient reported, records requested  - left calf, ~2017  - second DN ~ 2010     Social history: Lived in [place] and [place]. Moved to [place] for job.  ____________________________________________     Assessment & Plan:     # On Humira for Chron's Disease. Discussed increased risk of skin cancers with immunosuppressing medications.   - Recommend sunscreens SPF #30 or greater, protective clothing and avoidance of tanning beds.   - Recommend yearly skin exams.     # Hx DNs  - edu on increased melanoma risk  - continue annual skin exams    # Benign findings: multiple benign nevi, lentigines, cherry angiomas, SKs  - edu on benign etiology  - Signs and Symptoms of non-melanoma skin cancer and ABCDEs of melanoma reviewed with patient. Patient encouraged to perform monthly self skin exams and educated on how to perform them. UV precautions reviewed with patient. Patient was asked about new or changing moles/lesions on body.   - Sunscreen: Apply 20 minutes prior to going outdoors and reapply every two hours, when wet or sweating. We recommend using an SPF 30 or higher, and to use one that is water resistant.     - RTC for changes    Procedures Performed:   None    Follow-up: 1 year(s) in-person, or earlier for new or changing lesions    Staff and scribe:    Scribe Disclosure:   I, FAZAL FORRESTER, am serving as a scribe; to document services personally performed by Lorena Arias PA-C -based on data collection and the provider's statements to me.     Provider Disclosure:  I agree with above History, Review of Systems, Physical exam and Plan.  I have reviewed the content of the documentation and have edited it as needed. I have personally performed the services documented here and the documentation accurately represents those  services and the decisions I have made.      Electronically signed by:    All risks, benefits and alternatives were discussed with patient.  Patient is in agreement and understands the assessment and plan.  All questions were answered.    Lorena Arias PA-C, MPAS  [facility]: Phone: [phone], Fax: [phone]  [facility]: Phone: [phone],  Fax: [phone]  [facility]: Phone: [phone], Fax: [phone]  ____________________________________________    CC: Skin Check (Pt is here for a FBSC. R mid lower back, no other areas of concern.)      Reviewed patients past medical history and pertinent chart review prior to patient's visit today.     HPI:  Mr. Monty Fleming is a 48 year old male who presents today as a return patient for FBSC.     Today patient reported a spot of concern on his R lower back.     Has a hx of DN    Patient is otherwise feeling well, without additional concerns.    Labs:  N/A    Physical Exam:  Vitals: There were no vitals taken for this visit.  SKIN: Total skin excluding the undergarment areas was performed. The exam included the head/face, neck, both arms, chest, back, abdomen, both legs, digits and/or nails.    - - Quezada's skin type II, has <100 nevi  - There are dome shaped bright red papules on the trunk.   - Multiple regular brown pigmented macules and papules are identified on the trunk and extremities.   - Scattered brown macules on sun exposed areas.  - There are waxy stuck on tan to brown papules on the trunk.    - No other lesions of concern on areas examined.     Medications:  Current Outpatient Medications   Medication Sig Dispense Refill    acetaminophen (TYLENOL) 500 MG tablet Take 500 mg by mouth      adalimumab (HUMIRA *CF* PEN) 40 MG/0.4ML pen kit Inject 0.4 mLs (40 mg) Subcutaneous every 7 days 4 each 11    AJOVY 225 MG/1.5ML SOAJ Inject 1  subcutaneous injection per month      amitriptyline (ELAVIL) 10 MG tablet Take 10 mg by mouth daily      cholecalciferol 25 MCG (1000 UT) TABS Take 2,000 Units by mouth      cyanocobalamin (VITAMIN B-12) 1000 MCG tablet Take 1,000 mcg by mouth daily      diclofenac (VOLTAREN) 1 % topical gel Apply 2 g topically 4 times daily 100 g 11    dicyclomine (BENTYL) 10 MG capsule Take 1 capsule (10 mg) by mouth 4 times daily as needed (abdominal pain) 270 capsule 3    eletriptan (RELPAX) 20 MG tablet Take 20 mg by mouth at onset of headache       fexofenadine (ALLEGRA) 180 MG tablet Take 180 mg by mouth      fluticasone (FLONASE) 50 MCG/ACT nasal spray 1 spray      hyoscyamine ER (LEVBID) 375 mcg 12 hr tablet Take 0.375 mg by mouth every 12 hours as needed       ondansetron (ZOFRAN-ODT) 4 MG ODT tab Take 4 mg by mouth every 6 hours as needed       Peppermint Oil (IBGARD PO) Take 180 mg by mouth daily      tadalafil (CIALIS) 20 MG tablet Take 20 mg by mouth      azelastine-fluticasone (DYMISTA) 137-50 MCG/ACT nasal spray Spray 1 spray in nostril      Butalbital-Acetaminophen (PHRENILIN)  MG TABS per tablet  (Patient not taking: Reported on 5/22/2024)      famotidine (PEPCID) 40 MG tablet TAKE 1 TABLET NIGHTLY AS NEEDED FOR HEARTBURN (Patient not taking: Reported on 5/22/2024) 30 tablet 11    hydrocortisone (ANUSOL-HC) 25 MG suppository Place 1 suppository (25 mg) rectally 2 times daily as needed for hemorrhoids (Patient not taking: Reported on 5/22/2024) 24 suppository 3    metoprolol succinate ER (TOPROL XL) 100 MG 24 hr tablet Take 1 tablet by mouth daily       No current facility-administered medications for this visit.      Past Medical/Surgical History:   Patient Active Problem List   Diagnosis    Abnormal MRI of head    Allergic rhinitis    Bursitis, infrapatellar or subpatellar    Chronic bursitis of left shoulder    Cough, persistent    Crohn's disease of colon with complication (H)    ED (erectile dysfunction)     Dysphagia, unspecified    GERD (gastroesophageal reflux disease)    Fatigue    Hearing loss on left    History of pneumonia    History of irritable bowel syndrome    Hyperglycemia    Hypogonadism male    Insufficient sleep syndrome    Migraine headache    CAROLE on CPAP    Peripheral vestibulopathy    Swallowing dysfunction    Tendonitis of ankle, left    Ventricular arrhythmia    Vitamin D deficiency     Past Medical History:   Diagnosis Date    Crohn's disease (H) 2018    Dysplastic nevus     ED (erectile dysfunction)     Fatty liver     Gastroesophageal reflux disease with esophagitis     Migraine     Plantar fasciitis     PONV (postoperative nausea and vomiting)     PVC's (premature ventricular contractions)     Seasonal allergic rhinitis     Sleep apnea

## 2024-05-28 ENCOUNTER — ANCILLARY PROCEDURE (OUTPATIENT)
Dept: MRI IMAGING | Facility: CLINIC | Age: 49
End: 2024-05-28
Attending: PSYCHIATRY & NEUROLOGY
Payer: COMMERCIAL

## 2024-05-28 DIAGNOSIS — R90.89 ABNORMAL BRAIN MRI: ICD-10-CM

## 2024-05-28 DIAGNOSIS — G93.9 BRAIN LESION: ICD-10-CM

## 2024-05-28 PROCEDURE — 70553 MRI BRAIN STEM W/O & W/DYE: CPT | Mod: GC | Performed by: RADIOLOGY

## 2024-05-28 PROCEDURE — A9585 GADOBUTROL INJECTION: HCPCS | Performed by: RADIOLOGY

## 2024-05-28 RX ORDER — GADOBUTROL 604.72 MG/ML
10 INJECTION INTRAVENOUS ONCE
Status: COMPLETED | OUTPATIENT
Start: 2024-05-28 | End: 2024-05-28

## 2024-05-28 RX ADMIN — GADOBUTROL 10 ML: 604.72 INJECTION INTRAVENOUS at 10:54

## 2024-05-31 DIAGNOSIS — K50.80 CROHN'S DISEASE OF BOTH SMALL AND LARGE INTESTINE WITHOUT COMPLICATION (H): ICD-10-CM

## 2024-05-31 RX ORDER — ADALIMUMAB 40MG/0.4ML
KIT SUBCUTANEOUS
Qty: 4 EACH | Refills: 11 | Status: SHIPPED | OUTPATIENT
Start: 2024-05-31 | End: 2024-06-11

## 2024-06-10 NOTE — PROGRESS NOTES
Medication Therapy Management (MTM) Encounter    ASSESSMENT:                            Medication Adherence/Access: No issues identified    Crohn's Disease:  Monty would benefit from continued treatment with Humira (Adalimumab) 40 mg every 7 days.  He is past due for routine maintenance labs and annual tuberculosis screening.  He is indicated for additional lab work including Hepatitis B serologies. No access issues for his advanced therapy are present.  He is indicated for a few vaccinations which were recommended to him.  He is uncertain if getting adequate calcium from diet, provided calcium content information to help guide if calcium supplementation is needed.  He is not currently indicated for a DEXA scan at this time and I recommend him. Reminders for routine cancer screening were provided.     PLAN:                            Monty to consider the following vaccines:    - Shingrix (Shingles), 2 dose series, order sent to [facility]  - if having issues with insurance coverage, can wait until you turn 49 and should be covered  - Prenvar-20 (Pneumonia), 1 dose, order sent to [facility]  - Flu annually    Order for Humira (Adalimumab) refills has been sent to [facility]     It is recommended to get 1,000 - 1,200 mg of Calcium per day from all sources, if you are unable to achieve this from diet alone then supplementation is appropriate.  Recommend taking 500 or 600 mg tablet up to twice daily to achieve daily Calcium goal.    - You may review this reference of Calcium content in food for guidance:  https://www.dietaryguidelines.gov/food-sources-calcium or the International [employer]opor[employer] Foundation has a great chart as well!    Complete routine labs 6/26/24 at your in person visit with Dr. Beck  - Can also have annual Tuberculous screening and Hepatitis B labs completed at this time    Follow-up: 6 months MT, December 10th, 2024 at 10 am     SUBJECTIVE/OBJECTIVE:                          Monty Fleming is a 48  "year old male contacted via secure video for an initial visit. He was referred to me from Dr. Ruby Beck DO.      Reason for visit: Humira (Adalimumab) maintenance and Annual IBD Health Maintenance Review     Allergies/ADRs: Reviewed in chart  Past Medical History: Reviewed in chart  Tobacco: He reports that he has never smoked. He has never used smokeless tobacco.  Alcohol: not currently using    Medication Adherence/Access: no issues reported  Uses reminder on phone x3 (doses on Fridays) for dose reminders, and pharmacy [facility] reaches out to him with refill reminders. Missed previous dose due to travel and forgot to bring with but this is very rare for him.     Crohn's Disease:  - Dicyclomine: takes 1 capsule once every 3 weeks, takes for excessive cramping / abdominal pain, does find it effective  - Hyoscyamine, last took a few months ago, prefers dicycomine due to side effect of dry mouth   - Ondansetron: took a dose yesterday, feels is effective, takes once per week average  - Vitamin D 5000 units 2 caps daily, confirms this is the dose he was taking when labs were drawn in December  - Vitamin B 12 1000 units daily  - Humira (Adalimumab) 40 mg every 7 days   Original start date: 6/2019  Last dose:  6/7/24  Next dose: 6/14/24    Reports no break through symptoms with missing a dose recently due to travel.  Feels Humira (Adalimumab) has improved symptoms significantly, and his colonoscopy looks good. Does notice Saturday he is more tired and fatigued, feels \"wiped\" but could be from working a full week. Minimal injection site reactions, only once and awhile.     GI symptoms:  -- In the past 2 weeks:   - fluctuates between constipation and loose stools, some cramping intermittently    Lab Results   Component Value Date    CALPRF 22.0 04/08/2023    CRPI <3.00 12/27/2023    VITDT 44 12/27/2023     Last provider visit: 12/27/24 Dr. Ruby Beck DO   Next provider visit: 6/26/24 Dr. Ruby Beck DO "   Last labs completed: 23  Lab frequency: every 3 months   - standing labs not on file   Next labs due: past due   Last TB screenin/10/2018    PDC: 100%    IBD History:  Time of diagnosis:   Extent of disease: ileocolonic  Phenotype: stricturing  Previous therapies: humira at [facility] - now receiving weekly  EIM: joint pains  Previous surgeries: none for crohns, did have nissen in the past  Most recent endoscopy:     IBD Health Maintenance    Vaccinations:  All patients on biologics should avoid live vaccines unless specifically indicated.    -- Influenza (every year) last   -- TdaP (every 10 years) last   -- Pneumococcal Pneumonia    Prevnar-13: 2019   Pneumovax-23: not on file    Prevnar-20: not on file, order to [facility] in [place]   -- COVID-19 last , not interested in any more     One time confirmation of immunity or serologies:  -- Hepatitis A (serologies or immunizations) vaccine x2 ,   -- Hepatitis B (serologies or immunizations) vaccine x3      - serologies taken in 2018 showed insufficient immunity, since had 3 vaccines, would be open to checking immunity level    -- Varicella/Zoster    Varicella confirms chickenpox infection as a child    Zoster not on file, Send order to [facility]    -- MMR not on file   -- Meningococcal meningitis (all patients at risk for meningitis)-- not on file, declines risk factors    Due to the immunosuppression in this patient, I would not advise administration of live vaccines such as varicella/VZV, intranasal influenza, MMR, or yellow fever vaccine (if traveling).      Immunosuppressive Screening:  -- Hep B Surface Antibody serologies indicate insufficient immunity per [facility] 12/10/2018  -- Hep B Surface Antigen negative per [facility]  -- Hep B Core Antibody not on file, would like lab ordered   -- Hep C Antibody negative per [facility]  -- Yearly assessment of TB Negative   per [facility]    Bone mineral density screening   -- Recommend all patients supplement with calcium and vitamin D   - unsure if getting adequate calcium from diet, vegetarian, does little dairy and eggs, uses almond milk  -- Given minimal prior steroid use recommend continued monitoring for DEXA need  - last prednisone 5 years or more ago    Cancer Screening:  Colon cancer screening:  Per Dr. Beck     Skin cancer screening: Annual visual exam of skin by dermatologist since patient is immunocompromised, was just in 2 weeks ago, next scheduled 5/28/25    Depression Screening:    PHQ-2 Score:         6/11/2024    11:53 AM 5/4/2023     9:03 AM   PHQ-2 ( 1999 [employer])   Q1: Little interest or pleasure in doing things 0 0   Q2: Feeling down, depressed or hopeless 0 0   PHQ-2 Score 0 0     Research:  Are you interested in being contacted about enrollment in clinical research studies? Yes    Would you like to receive a quarterly newsletter on research via email. YES [email or web address]       Misc:  -- Avoid tobacco use  -- Avoid NSAIDs as there is potentially a 25% chance of causing an IBD flare    ----------------    I spent 60 minutes with this patient today. All changes were made via collaborative practice agreement with Ruby Beck. A copy of the visit note was provided to the patient's provider(s).    A summary of these recommendations was sent via [employer].    Mandy Singh, PharmD    Medication Therapy Management Pharmacist  [facility]   [phone]    Telemedicine Visit Details  Type of service:  Video Conference via [employer]  Start Time:  11:00 AM  End Time:  12: 00 PM     Medication Therapy Recommendations  Crohn's disease of both small and large intestine without complication (H)    Rationale: Preventive therapy - Needs additional medication therapy - Indication   Recommendation: Order Vaccine - PREVNAR 20 IM   Status: Accepted per CPA   Note: Recommend Prevnar-20,  Shingrix          Rationale: Preventive therapy - Needs additional medication therapy - Indication   Recommendation: Start Medication - CALCIUM 500 PO   Status: Patient Agreed - Adherence/Education   Note: Recommend adding Calcium supplement daily

## 2024-06-11 ENCOUNTER — VIRTUAL VISIT (OUTPATIENT)
Dept: GASTROENTEROLOGY | Facility: CLINIC | Age: 49
End: 2024-06-11
Attending: INTERNAL MEDICINE
Payer: COMMERCIAL

## 2024-06-11 DIAGNOSIS — K50.80 CROHN'S DISEASE OF BOTH SMALL AND LARGE INTESTINE WITHOUT COMPLICATION (H): Primary | ICD-10-CM

## 2024-06-11 RX ORDER — ADALIMUMAB 40MG/0.4ML
40 KIT SUBCUTANEOUS
Qty: 4 EACH | Refills: 5 | Status: SHIPPED | OUTPATIENT
Start: 2024-06-11

## 2024-06-11 RX ORDER — ZOSTER VACCINE RECOMBINANT, ADJUVANTED 50 MCG/0.5
KIT INTRAMUSCULAR
Qty: 1 EACH | Refills: 1 | Status: SHIPPED | OUTPATIENT
Start: 2024-06-11 | End: 2024-06-14

## 2024-06-11 RX ORDER — ELETRIPTAN HYDROBROMIDE 40 MG/1
40 TABLET, FILM COATED ORAL
COMMUNITY

## 2024-06-11 RX ORDER — PNEUMOCOCCAL 20-VALENT CONJUGATE VACCINE 2.2; 2.2; 2.2; 2.2; 2.2; 2.2; 2.2; 2.2; 2.2; 2.2; 2.2; 2.2; 2.2; 2.2; 2.2; 2.2; 4.4; 2.2; 2.2; 2.2 UG/.5ML; UG/.5ML; UG/.5ML; UG/.5ML; UG/.5ML; UG/.5ML; UG/.5ML; UG/.5ML; UG/.5ML; UG/.5ML; UG/.5ML; UG/.5ML; UG/.5ML; UG/.5ML; UG/.5ML; UG/.5ML; UG/.5ML; UG/.5ML; UG/.5ML; UG/.5ML
0.5 INJECTION, SUSPENSION INTRAMUSCULAR ONCE
Qty: 0.5 ML | Refills: 0 | Status: SHIPPED | OUTPATIENT
Start: 2024-06-11 | End: 2024-06-11

## 2024-06-11 NOTE — Clinical Note
6/11/2024      Monty Fleming  [address] [facility] [address] [facility] [address]      Dear Colleague,    Thank you for referring your patient, Monty Fleming, to the [facility]. Please see a copy of my visit note below.    Medication Therapy Management (MTM) Encounter    ASSESSMENT:                            Medication Adherence/Access: No issues identified    Crohn's Disease:  *** would benefit from continued treatment with ***.  *** is up to date on routine maintenance labs and annual tuberculosis screening.  *** is indicated for additional lab work including ***. No access issues for *** advanced therapy are present.  *** is indicated for a few vaccinations which were recommended to ***.  *** is uncertain if getting adequate calcium from diet, provided calcium content information to help guide if calcium supplementation is needed.  *** is *** indicated for a DEXA scan at this time and I recommend ***. Reminders for routine cancer screening were provided.         PLAN:                            Monty to consider the following vaccines:    - Shingrix (Shingles), 2 dose series  - Prenvar-20 (Pneumonia), 1 dose  - Flu annually    Order Humira (Adalimumab) refills have been sent to [facility]     It is recommended to get 1,000 - 1,200 mg of Calcium per day from all sources, if you are unable to achieve this from diet alone then supplementation is appropriate.  Recommend taking 500 or 600 mg tablet up to twice daily to achieve daily Calcium goal.    - You may review this reference of Calcium content in food for guidance:  https://www.dietaryguidelines.gov/food-sources-calcium or the International Osteoporosis Foundation has a great chart as well!      Follow-up: 6 months MT, , dec 10 10 am     SUBJECTIVE/OBJECTIVE:                          Monty Fleming is a 48 year old male { :[id number]} for an initial visit. He was referred to me from Dr. Ruby Beck DO. {mtmvisitdetails:[id number]}     Reason for visit:  "Humira (Adalimumab) maintenance and Annual IBD Health Maintenance Review     Allergies/ADRs: {1/2/3/4/5:028469}  Past Medical History: {1/2/3/4/5:905950}  Tobacco: He reports that he has never smoked. He has never used smokeless tobacco.  Alcohol: not currently using    Medication Adherence/Access: {kyle:[id number]}  Uses reminder on phone x3 (doses on ) for dose reminders, and pharmacy [facility] reaches out to him with refill reminders. Missed previous dose due to travel and forgot to bring with but this is very rare for him.     Crohn's Disease:  - Dicyclomine: takes 1 capsule once every 3 weeks, takes for excessive cramping / abdominal pain, does find it effective  - Hyoscyamine, last took a few months ago,prefers dicycomine due to side effects of dry mouth   - Ondansetron: took a dose yesterday, feels is effective, takes once a week average  - Humira (Adalimumab) 40 mg every 7 days   Original start date: 2019  Last dose:  24  Next dose: 24    Feels Humira (Adalimumab) has improved symptoms significantly, colonoscopy looks good. Does notice  is more tired and fatigue, feels \"wiped\" but could be from working a full week. Minimal injection site reactions, only once and awhile.     GI symptoms:  -- In the past 2 weeks:   - fluctuates between constipation and loose stools, some cramping intermittently    Lab Results   Component Value Date    CALPRF 22.0 2023    CRPI <3.00 2023     Last provider visit: 24Dr. Ruby Beck DO   Next provider visit: 24 Dr. Ruby Beck DO   Last labs completed: 23  Lab frequency: every 3 months   - standing labs available until *** done with outside lab ***  Next labs due: *** clarify frequency with Dr. Ruby Beck DO and okay with [facility] labs   Last TB screenin/10/2018 ***   PDC: 100%    IBD History:  Time of diagnosis:   Extent of disease: ileocolonic  Phenotype: stricturing  Previous " therapies: humira at [id number] - now receiving weekly  EIM: joint pains  Previous surgeries: none for crohns, did have nissen in the past  Most recent endoscopy: 2022    IBD Health Maintenance    Vaccinations:  All patients on biologics should avoid live vaccines unless specifically indicated.    -- Influenza (every year) last 2023  -- TdaP (every 10 years) last 2020  -- Pneumococcal Pneumonia    Prevnar-13: 1/2019   Pneumovax-23: not on file    Prevnar-20: not on file, [facility] in [place] ***  -- COVID-19 last 2021, not interested in any more     One time confirmation of immunity or serologies:  -- Hepatitis A (serologies or immunizations) vaccine x2 1999, 2019  -- Hepatitis B (serologies or immunizations) vaccine x3 2019     - serologies taken in 2018 showed insufficient immunity, since had 3 vaccines, would be open to checking immunity level ***   -- Varicella/Zoster    Varicella confirms chickenpox infection as a child    Zoster not on file, Send order to [facility]  ***  -- MMR not on file   -- Meningococcal meningitis (all patients at risk for meningitis)-- not on file, declines risk factors    Due to the immunosuppression in this patient, I would not advise administration of live vaccines such as varicella/VZV, intranasal influenza, MMR, or yellow fever vaccine (if traveling).      Immunosuppressive Screening:  -- Hep B Surface Antibody serologies indicate insufficient immunity per [facility] 12/10/2018  -- Hep B Surface Antigen negative per [facility]  -- Hep B Core Antibody not on file ***  -- Hep C Antibody negative per [facility]  -- Yearly assessment of TB Negative 2018 per [facility]    Bone mineral density screening   -- Recommend all patients supplement with calcium and vitamin D   - unsure if getting adequate calcium from diet, vegetarian, does little dairy and eggs, uses almond milk  -- Given minimal prior steroid use recommend continued  monitoring for DEXA need  - last prednisone 5 years or more ago    Cancer Screening:  Colon cancer screening:  Per Dr. Beck     Skin cancer screening: Annual visual exam of skin by dermatologist since patient is immunocompromised, was just in 2 weeks ago, next scheduled 5/28/25    Depression Screening:    PHQ-2 Score:         6/11/2024    11:53 AM 5/4/2023     9:03 AM   PHQ-2 ( 1999 Pf[employer])   Q1: Little interest or pleasure in doing things 0 0   Q2: Feeling down, depressed or hopeless 0 0   PHQ-2 Score 0 0     Research:  Are you interested in being contacted about enrollment in clinical research studies? Yes    Would you like to receive a quarterly newsletter on research via email. YES [email or web address]       Misc:  -- Avoid tobacco use  -- Avoid NSAIDs as there is potentially a 25% chance of causing an IBD flare    ----------------    I spent 60 minutes with this patient today. All changes were made via collaborative practice agreement with Ruby Beck. A copy of the visit note was provided to the patient's provider(s).    A summary of these recommendations was sent via [employer].    Mandy Singh, PharmD    Medication Therapy Management Pharmacist  [facility]   [phone]    Telemedicine Visit Details  Type of service:  Video Conference via [employer]  Start Time:  11:00 AM  End Time:  12: 00 PM     Medication Therapy Recommendations  No medication therapy recommendations to display       Medication Therapy Management (MTM) Encounter    ASSESSMENT:                            Medication Adherence/Access: No issues identified    Crohn's Disease:  Monty would benefit from continued treatment with Humira (Adalimumab) 40 mg every 7 days.  He is past due for routine maintenance labs and annual tuberculosis screening.  He is indicated for additional lab work including Hepatitis B serologies. No access issues for his advanced therapy are present.  He is indicated for a few vaccinations which  were recommended to him.  He is uncertain if getting adequate calcium from diet, provided calcium content information to help guide if calcium supplementation is needed.  He is not currently indicated for a DEXA scan at this time and I recommend him. Reminders for routine cancer screening were provided.     PLAN:                            Monty to consider the following vaccines:    - Shingrix (Shingles), 2 dose series, order sent to [facility]  - if having issues with insurance coverage, can wait until you turn 49 and should be covered  - Prenvar-20 (Pneumonia), 1 dose, order sent to [facility]  - Flu annually    Order for Humira (Adalimumab) [facility]     It is recommended to get 1,000 - 1,200 mg of Calcium per day from all sources, if you are unable to achieve this from diet alone then supplementation is appropriate.  Recommend taking 500 or 600 mg tablet up to twice daily to achieve daily Calcium goal.    - You may review this reference of Calcium content in food for guidance:  https://www.dietaryguidelines.gov/food-sources-calcium or the International Osteoporosis Foundation has a great chart as well!    Complete routine labs 6/26/24 at your in person visit with Dr. Beck  - Can also have annual Tuberculous screening and Hepatitis B labs completed at this time    Follow-up: 6 months MT, December 10th, 2024 at 10 am     SUBJECTIVE/OBJECTIVE:                          Monty Fleming is a 48 year old male contacted via secure video for an initial visit. He was referred to me from Dr. Ruby Beck, .      Reason for visit: Humira (Adalimumab) maintenance and Annual IBD Health Maintenance Review     Allergies/ADRs: Reviewed in chart  Past Medical History: Reviewed in chart  Tobacco: He reports that he has never smoked. He has never used smokeless tobacco.  Alcohol: not currently using    Medication Adherence/Access: no issues reported  Uses reminder on phone x3 (doses on Fridays) for  "dose reminders, and pharmacy [facility] reaches out to him with refill reminders. Missed previous dose due to travel and forgot to bring with but this is very rare for him.     Crohn's Disease:  - Dicyclomine: takes 1 capsule once every 3 weeks, takes for excessive cramping / abdominal pain, does find it effective  - Hyoscyamine, last took a few months ago, prefers dicycomine due to side effect of dry mouth   - Ondansetron: took a dose yesterday, feels is effective, takes once per week average  - Vitamin D 5000 units 2 caps daily, confirms this is the dose he was taking when labs were drawn in December  - Vitamin B 12 1000 units daily  - Humira (Adalimumab) 40 mg every 7 days   Original start date: 2019  Last dose:  24  Next dose: 24    Reports no break through symptoms with missing a dose recently due to travel.  Feels Humira (Adalimumab) has improved symptoms significantly, and his colonoscopy looks good. Does notice Saturday he is more tired and fatigued, feels \"wiped\" but could be from working a full week. Minimal injection site reactions, only once and awhile.     GI symptoms:  -- In the past 2 weeks:   - fluctuates between constipation and loose stools, some cramping intermittently    Lab Results   Component Value Date    CALPRF 22.0 2023    CRPI <3.00 2023    VITDT 44 2023     Last provider visit: 24 Dr. Ruby Beck,    Next provider visit: 24 Dr. Ruby Beck,    Last labs completed: 23  Lab frequency: every 3 months   - standing labs not on file   Next labs due: past due   Last TB screenin/10/2018    PDC: 100%    IBD History:  Time of diagnosis:   Extent of disease: ileocolonic  Phenotype: stricturing  Previous therapies: humira at [id number] - now receiving weekly  EIM: joint pains  Previous surgeries: none for crohns, did have nissen in the past  Most recent endoscopy:     IBD Health Maintenance    Vaccinations:  All patients on biologics " should avoid live vaccines unless specifically indicated.    -- Influenza (every year) last 2023  -- TdaP (every 10 years) last 2020  -- Pneumococcal Pneumonia    Prevnar-13: 1/2019   Pneumovax-23: not on file    Prevnar-20: not on file, order to [facility] in [place]   -- COVID-19 last 2021, not interested in any more     One time confirmation of immunity or serologies:  -- Hepatitis A (serologies or immunizations) vaccine x2 1999, 2019  -- Hepatitis B (serologies or immunizations) vaccine x3 2019     - serologies taken in 2018 showed insufficient immunity, since had 3 vaccines, would be open to checking immunity level    -- Varicella/Zoster    Varicella confirms chickenpox infection as a child    Zoster not on file, Send order to [facility]    -- MMR not on file   -- Meningococcal meningitis (all patients at risk for meningitis)-- not on file, declines risk factors    Due to the immunosuppression in this patient, I would not advise administration of live vaccines such as varicella/VZV, intranasal influenza, MMR, or yellow fever vaccine (if traveling).      Immunosuppressive Screening:  -- Hep B Surface Antibody serologies indicate insufficient immunity per [facility] 12/10/2018  -- Hep B Surface Antigen negative per [facility]  -- Hep B Core Antibody not on file, would like lab ordered   -- Hep C Antibody negative per [facility]  -- Yearly assessment of TB Negative 2018 per [facility]    Bone mineral density screening   -- Recommend all patients supplement with calcium and vitamin D   - unsure if getting adequate calcium from diet, vegetarian, does little dairy and eggs, uses almond milk  -- Given minimal prior steroid use recommend continued monitoring for DEXA need  - last prednisone 5 years or more ago    Cancer Screening:  Colon cancer screening:  Per Dr. Beck     Skin cancer screening: Annual visual exam of skin by dermatologist since patient is  immunocompromised, was just in 2 weeks ago, next scheduled 5/28/25    Depression Screening:    PHQ-2 Score:         6/11/2024    11:53 AM 5/4/2023     9:03 AM   PHQ-2 ( 1999 Pfizer)   Q1: Little interest or pleasure in doing things 0 0   Q2: Feeling down, depressed or hopeless 0 0   PHQ-2 Score 0 0     Research:  Are you interested in being contacted about enrollment in clinical research studies? Yes    Would you like to receive a quarterly newsletter on research via email. YES [email or web address]       Misc:  -- Avoid tobacco use  -- Avoid NSAIDs as there is potentially a 25% chance of causing an IBD flare    ----------------    I spent 60 minutes with this patient today. All changes were made via collaborative practice agreement with Ruby Beck. A copy of the visit note was provided to the patient's provider(s).    A summary of these recommendations was sent via [employer].    Mandy Singh, PharmD    Medication Therapy Management Pharmacist  [facility]   [phone]    Telemedicine Visit Details  Type of service:  Video Conference via [employer]  Start Time:  11:00 AM  End Time:  12: 00 PM     Medication Therapy Recommendations  No medication therapy recommendations to display         Again, thank you for allowing me to participate in the care of your patient.        Sincerely,        Mandy Singh RPH

## 2024-06-11 NOTE — Clinical Note
Has not had labs completed since December, recommendations recently shifted to every 3 months, verifying you would like him to have routine labs every 3 months.  He does not have standing orders in place, if you would like me to order I can, otherwise he is scheduled with you on 6/26 for in person visit and can get labs then.  Of note, he is past due on TB, and some Hep B labs are out dated or have not been completed, I did place orders for these, let me know how else I can help!

## 2024-06-11 NOTE — PATIENT INSTRUCTIONS
"Recommendations from today's MTM visit:                                                      Monty to consider the following vaccines:    - Shingrix (Shingles), 2 dose series, order sent to [facility]  - if having issues with insurance coverage, can wait until you turn 49 and should be covered  - Prenvar-20 (Pneumonia), 1 dose, order sent to [facility]  - Flu annually    Order for Humira (Adalimumab) refills has been sent to [facility]     It is recommended to get 1,000 - 1,200 mg of Calcium per day from all sources, if you are unable to achieve this from diet alone then supplementation is appropriate.  Recommend taking 500 or 600 mg tablet up to twice daily to achieve daily Calcium goal.    - You may review this reference of Calcium content in food for guidance:  https://www.dietaryguidelines.gov/food-sources-calcium or the International Osteoporosis Foundation has a great chart as well!    Complete routine labs 6/26/24 at your in person visit with Dr. Beck  - Can also have annual Tuberculous screening and Hepatitis B labs completed at this time    Follow-up: 6 months MT, December 10th, 2024 at 10 am     It was great speaking with you today.  I value your experience and would be very thankful for your time in providing feedback in our clinic survey. In the next few days, you may receive an email or text message from [employer] with a link to a survey related to your  clinical pharmacist.\"     To schedule another MTM appointment, please call the clinic directly or you may call the MTM scheduling line at [phone] or toll-free at [phone].     My Clinical Pharmacist's contact information:                                                      Please feel free to contact me with any questions or concerns you have.      Mandy Singh, PharmD    Medication Therapy Management Pharmacist  [facility]   [phone]   "

## 2024-06-14 ENCOUNTER — PRE VISIT (OUTPATIENT)
Dept: ONCOLOGY | Facility: CLINIC | Age: 49
End: 2024-06-14
Payer: COMMERCIAL

## 2024-06-14 ENCOUNTER — PATIENT OUTREACH (OUTPATIENT)
Dept: ONCOLOGY | Facility: CLINIC | Age: 49
End: 2024-06-14
Payer: COMMERCIAL

## 2024-06-14 ENCOUNTER — ONCOLOGY VISIT (OUTPATIENT)
Dept: ONCOLOGY | Facility: CLINIC | Age: 49
End: 2024-06-14
Attending: NURSE PRACTITIONER
Payer: COMMERCIAL

## 2024-06-14 VITALS
TEMPERATURE: 98.9 F | RESPIRATION RATE: 16 BRPM | BODY MASS INDEX: 32.72 KG/M2 | OXYGEN SATURATION: 100 % | HEART RATE: 96 BPM | HEIGHT: 73 IN | WEIGHT: 246.89 LBS | SYSTOLIC BLOOD PRESSURE: 143 MMHG | DIASTOLIC BLOOD PRESSURE: 84 MMHG

## 2024-06-14 DIAGNOSIS — G93.9 BRAIN LESION: Primary | ICD-10-CM

## 2024-06-14 DIAGNOSIS — R90.89 ABNORMAL BRAIN MRI: ICD-10-CM

## 2024-06-14 PROCEDURE — 99213 OFFICE O/P EST LOW 20 MIN: CPT | Performed by: PSYCHIATRY & NEUROLOGY

## 2024-06-14 PROCEDURE — 99205 OFFICE O/P NEW HI 60 MIN: CPT | Performed by: PSYCHIATRY & NEUROLOGY

## 2024-06-14 ASSESSMENT — PAIN SCALES - GENERAL: PAINLEVEL: NO PAIN (0)

## 2024-06-14 NOTE — PROGRESS NOTES
NEURO-ONCOLOGY INITIAL VISIT  Jun 14, 2024    CHIEF COMPLAINT: Mr. Monty Fleming is a 48 year old  man with a left parietal periventricular T2 hyperintense signal abnormality initially seen on imaging in 4/2014 and on subsequent imaging through 5/2024, this lesion has remained stable. Therefore, this lesion is most radiographically consistent with gliosis. As such, no intervention is necessary and can look to repeat imaging in 2 years.     Monty is presenting to this initial clinic visit as referred by Dr. Strickland for evaluation and recommendations on management. Accompanying him to this visit is Emmy (wife).     HISTORY OF PRESENT ILLNESS  A summary of the patient s oncologic history is as follows;   -PRESENTATION: Headaches, balance issues, dizziness and fatigue.  -4/17/2014 MR brain imaging with a single area of increased T2 and decreased T1 signal in the left parietal periventricular white matter.   -1/2015 MR brain imaging where the lesion appeared unchanged   -2/2024 MR brain imaging with a 9 mm rounded T2 hyperintense T1 hypointense focus signal abnormality in the posterior left juxta callosal region without associated enhancement or mass effect. The location and morphology is suspicious for demyelination. Gliosis is also possible, but considered less likely. Indolent neoplasm is additional unlikely consideration.   -2/2024 MRI cervical and thoracic spine without concern.   -5/28/2024 MR brain imaging with a stable nonenhancing T2 hyperintense focus along the posterior body of the left corpus callosum. Differential considerations include demyelinating plaque, low-grade glioma, or possibly gliosis.  -6/14/2024 NEURO-ONC: Recommending imaging surveillance; repeat imaging in 24 months.     Today in clinic;   -Monty notes chronic fatigue and lower stamina.   -Numbness over his left thigh, nowhere else.  -Episodes of decreased visual acuity/ blurry vision in the evenings at times. No double vision.   -No cognitive  issues.   -Chronic headaches; frequency of ~once or twice a week; severity of pain at a 3/10; throbbing, stabbing, pressure like pain; duration for hours. Associated with nausea.   -Lightheadedness when bending over or when standing up too quickly.    History of balance issues; seen in vestibular rehab.   -No history of seizures.   -Ongoing abdominal pain. Emmy notes that his limits his activity.       MEDICATIONS   Current Outpatient Medications   Medication Sig Dispense Refill    acetaminophen (TYLENOL) 500 MG tablet Take 500 mg by mouth      adalimumab (HUMIRA *CF* PEN) 40 MG/0.4ML pen kit Inject 0.4 mLs (40 mg) Subcutaneous every 7 days 4 each 5    AJOVY 225 MG/1.5ML SOAJ Inject 1 subcutaneous injection per month      amitriptyline (ELAVIL) 10 MG tablet Take 10 mg by mouth daily      Ascorbic Acid (VITAMIN C) 500 MG CHEW Take 500 mg by mouth every morning      cholecalciferol (VITAMIN D3) 125 mcg (5000 units) capsule Take 2 capsules by mouth daily      cyanocobalamin (VITAMIN B-12) 1000 MCG tablet Take 1,000 mcg by mouth daily      diclofenac (VOLTAREN) 1 % topical gel Apply 2 g topically 4 times daily 100 g 11    dicyclomine (BENTYL) 10 MG capsule Take 1 capsule (10 mg) by mouth 4 times daily as needed (abdominal pain) 270 capsule 3    eletriptan (RELPAX) 40 MG tablet Take 40 mg by mouth at onset of headache for migraine Ave per month 2 tabs      fexofenadine (ALLEGRA) 180 MG tablet Take 180 mg by mouth      fluticasone (FLONASE) 50 MCG/ACT nasal spray 1 spray      hyoscyamine ER (LEVBID) 375 mcg 12 hr tablet Take 0.375 mg by mouth every 12 hours as needed       ondansetron (ZOFRAN-ODT) 4 MG ODT tab Take 4 mg by mouth every 6 hours as needed       tadalafil (CIALIS) 20 MG tablet Take 20 mg by mouth       DRUG ALLERGIES No Known Allergies      IMMUNIZATIONS   Immunization History   Administered Date(s) Administered    COVID-19 Monovalent 18+ (Moderna) 04/22/2021, 05/20/2021, 09/21/2021    Flu, Unspecified  "11/14/2014    HEPA 01/01/1999    Hepatitis A (ADULT 19+) 01/04/2019    Hepatitis B, Adult 01/04/2019, 05/15/2019, 07/15/2019    Influenza Vaccine 18-64 (Flublok) 11/13/2020, 11/30/2022    Influenza Vaccine, 6+MO IM (QUADRIVALENT W/PRESERVATIVES) 09/22/2017, 09/26/2018    Pneumo Conj 13-V (2010&after) 01/04/2019    TD,PF 7+ (Tenivac) 01/01/1999    TDAP (Adacel,Boostrix) 12/24/2016, 08/07/2020     PAST MEDICAL HISTORY   Past Medical History:   Diagnosis Date    Crohn's disease (H) 2018    Dysplastic nevus     ED (erectile dysfunction)     Fatty liver     Gastroesophageal reflux disease with esophagitis     Migraine     Plantar fasciitis     PONV (postoperative nausea and vomiting)     PVC's (premature ventricular contractions)     Seasonal allergic rhinitis     Sleep apnea      PAST SURGICAL HISTORY   Past Surgical History:   Procedure Laterality Date    COLONOSCOPY N/A 6/22/2022    Procedure: COLONOSCOPY, WITH POLYPECTOMY AND BIOPSY;  Surgeon: Ariel Drew MD;  Location:  GI    ESOPHAGOSCOPY, GASTROSCOPY, DUODENOSCOPY (EGD), COMBINED N/A 6/22/2022    Procedure: ESOPHAGOGASTRODUODENOSCOPY, WITH BIOPSY;  Surgeon: Ariel Drew MD;  Location:  GI    IR LUMBAR PUNCTURE  5/21/2024    NISSEN FUNDOPLICATION N/A 2016    x 2     SOCIAL HISTORY   History   Smoking Status    Never   Smokeless Tobacco    Never    Social History    Substance and Sexual Activity      Alcohol use: Never     History   Drug Use Not on file     FAMILY HISTORY   Family History   Problem Relation Age of Onset    Diabetes Maternal Grandfather     Coronary Artery Disease Paternal Grandfather        PHYSICAL EXAMINATION  BP (!) 143/84 (BP Location: Right arm, Patient Position: Sitting, Cuff Size: Adult Regular)   Pulse 96   Temp 98.9  F (37.2  C) (Oral)   Resp 16   Ht 1.865 m (6' 1.43\")   Wt 112 kg (246 lb 14.2 oz)   SpO2 100%   BMI 32.20 kg/m     Wt Readings from Last 2 Encounters:   06/14/24 112 kg (246 lb 14.2 oz) " "  12/27/23 107.9 kg (237 lb 12.8 oz)      Ht Readings from Last 2 Encounters:   06/14/24 1.865 m (6' 1.43\")   12/27/23 1.854 m (6' 1\")     KPS:     -Generally well appearing.  -Respiratory: Normal breath sounds, no audible wheezing.   -Psychiatric: Normal mood and affect. Pleasant, talkative.  -Neurologic:   MENTAL STATUS:     Alert, oriented to date.    Recall: Intact    Speech fluent.   Comprehension intact to multi-step commands.   Good right-left orientation.     CRANIAL NERVES:     Pupils are equal, round.     Extraocular movements full, denies diplopia.     Visual fields full.     Facial sensation intact to light touch.   Symmetric facial movements.   Hearing intact.   No dysarthria.   MOTOR:    No pronation or drift.   Able to rise from a chair without use of arms.   On toe/ heel walk, equal distance from floor to heels/ toes.   SENSATION: Intact to light touch throughout.   Except over the distribution of the left lateral cutaneous nerve.   COORDINATION: Intact finger-nose with eyes closed.   GAIT:  Walks without assistance.   Good speed. Normal stride length and heel strike. Normal turns. Normal arm swing.   Able to toe, heel walk. Able to tandem walk.       MEDICAL RECORDS  Personally reviewed [facility]y records from [facility]; management of migraine headaches, LP puncture.     LABS  Personally reviewed all available lab results;   5/21: Cerebrospinal fluid, lumbar puncture - No malignant cells identified   Albumin, CSF  0 - 35 mg/dL 15   Immunoglobulin G  768 - 1632 mg/dL 983   Immunoglobulin G CSF  0.0 - 6.0 mg/dL 1.8   Albumin, Serum  3500 - 5200 mg/dL 3,872   Albumin Index  0.0 - 9.0 ratio 3.9   CSF IgG Synthesis Rate  <=8.0 mg/d <0.0   CSF Oligoclonal Bands  Negative Negative     IMAGING  Personally reviewed recent MR brain imaging and compared it to imaging performed in 2015 (below);    Stable nonenhancing T2 hyperintense focus along the posterior body of the left corpus callosum. " Differential considerations include demyelinating plaque, low-grade glioma, or possibly gliosis.         Imaging was shown to and results were reviewed with Celio.       IMPRESSION  On date of service, 63 minutes was spent in clinic and 22 minutes was spent preparing for the visit through extensive chart review and coordinating care for this high complexity visit. The following is in explanation for the recommendations used to define the plan.       It was explained to Monty and Emmy that imaging demonstrates a small T2 hyperintense signal abnormality in the brain that is without mass effect or contrast enhancement and has been stable for ~10 years on subsequent MR brain scans. While no imaging modality is diagnostic and definite diagnosis can only be made on review of the lesion's pathology following a surgery, these radiographic characteristics are not at all consistent with a tumor. A demyelinating plaque was on the differential, but CSF work-up and spine imaging was negative plus there have been no new lesions appear over the past 10 years. As a result, gliosis from a prior healed insult seems most likely.     It is my impression that given the size and location, this lesion would not localize to Monty' complaints of headache, dizziness, fatigue, numbness, or vision changes. The pattern of numbness about the left lateral thigh is consistent with meralgia paresthetica. This numbness could resolve with time; please see recommendations below.    So, since he is asymptomatic with regard to this lesion, imaging has remained stable for 10 years, and there are no new concerning radiographic findings on the most recent scan, there is no intervention, diagnostic or therapeutic, indicated at this time. I explained that the risks associated with a biopsy/ resection out weigh the benefit of obtaining a diagnosis at this time. Therefore, it would be most advantageous to repeat MR brain imaging in 2 years. If  imaging is again stable in 2026, then there can be consideration for stopping surveillance imaging.     I personally reviewed Monty' imaging and case at Brain Tumor Conference and all in attendance were in agreement with this impression.    PROBLEM LIST  Brain lesion NOS  Abnormality of MR brain imaging  Crohn's colitis  Migraines   CAROLE on CPAP    PLAN  -BRAIN LESION NOS-  -As above; Repeat MRI in 24 months.    -MERALGIA PARESTHETICA-  -Recommend ways to reduce pressure over the nerve in the groin area; such as avoiding tight garments and belts.   Weight loss will be helpful.   -No additional imaging/ work-up needed at this time.      Return to clinic in 5/2026 + imaging.     Rosita Jones MD  Neuro-oncology

## 2024-06-14 NOTE — NURSING NOTE
"Oncology Rooming Note    June 14, 2024 9:10 AM   Monty Fleming is a 48 year old male who presents for:    Chief Complaint   Patient presents with    Oncology Clinic Visit     New Onc pt with Abnormal MRI of head     Initial Vitals: BP (!) 143/84 (BP Location: Right arm, Patient Position: Sitting, Cuff Size: Adult Regular)   Pulse 96   Temp 98.9  F (37.2  C) (Oral)   Resp 16   Ht 1.865 m (6' 1.43\")   Wt 112 kg (246 lb 14.2 oz)   SpO2 100%   BMI 32.20 kg/m   Estimated body mass index is 32.2 kg/m  as calculated from the following:    Height as of this encounter: 1.865 m (6' 1.43\").    Weight as of this encounter: 112 kg (246 lb 14.2 oz). Body surface area is 2.41 meters squared.  No Pain (0) Comment: Data Unavailable   No LMP for male patient.  Allergies reviewed: Yes  Medications reviewed: Yes    Medications: Medication refills not needed today.  Pharmacy name entered into [facility]:    [address]  [facility] - [place], [place] - [address]    Frailty Screening:   Is the patient here for a new oncology consult visit in cancer care? 2. No      Clinical concerns:  None      Pavel Sim              "

## 2024-06-14 NOTE — PROGRESS NOTES
[facility]: Cancer Care                                                                                                                     Met with patient after clinic visit/consultation appt with Dr Jones.    Introduced self and role of RN Care Coordinator at [facility]. Provided my contact information, [facility] phone number (which has options to talk with a Nurse available 24/7 - triage and RNCC via this option during business hours).     Reviewed appropriate use of MyChart, not to be used for symptom reporting.      Learning assessment: Completed     Answered all questions to patient stated satisfaction.     Follow up with scan + visit with Dr Jones 6/2026                                                    Diana Campbell, RN, BSN  Oncology/Neurosurgery Care Coordinator  [facility]

## 2024-06-14 NOTE — LETTER
6/14/2024      Monty Fleming  [address] [address]      Dear Colleague,    Thank you for referring your patient, Monty Fleming, to the [facility]. Please see a copy of my visit note below.    NEURO-ONCOLOGY INITIAL VISIT  Jun 14, 2024    CHIEF COMPLAINT: Mr. Monty Fleming is a 48 year old  man with a left parietal periventricular T2 hyperintense signal abnormality initially seen on imaging in 4/2014 and on subsequent imaging through 5/2024, this lesion has remained stable. Therefore, this lesion is most radiographically consistent with gliosis. As such, no intervention is necessary and can look to repeat imaging in 2 years.     Monty is presenting to this initial clinic visit as referred by Dr. Strickland for evaluation and recommendations on management. Accompanying him to this visit is Emmy (wife).     HISTORY OF PRESENT ILLNESS  A summary of the patient s oncologic history is as follows;   -PRESENTATION: Headaches, balance issues, dizziness and fatigue.  -4/17/2014 MR brain imaging with a single area of increased T2 and decreased T1 signal in the left parietal periventricular white matter.   -1/2015 MR brain imaging where the lesion appeared unchanged   -2/2024 MR brain imaging with a 9 mm rounded T2 hyperintense T1 hypointense focus signal abnormality in the posterior left juxta callosal region without associated enhancement or mass effect. The location and morphology is suspicious for demyelination. Gliosis is also possible, but considered less likely. Indolent neoplasm is additional unlikely consideration.   -2/2024 MRI cervical and thoracic spine without concern.   -5/28/2024 MR brain imaging with a stable nonenhancing T2 hyperintense focus along the posterior body of the left corpus callosum. Differential considerations include demyelinating plaque, low-grade glioma, or possibly gliosis.  -6/14/2024 NEURO-ONC: Recommending imaging surveillance; repeat imaging in 24  months.     Today in clinic;   -Monty notes chronic fatigue and lower stamina.   -Numbness over his left thigh, nowhere else.  -Episodes of decreased visual acuity/ blurry vision in the evenings at times. No double vision.   -No cognitive issues.   -Chronic headaches; frequency of ~once or twice a week; severity of pain at a 3/10; throbbing, stabbing, pressure like pain; duration for hours. Associated with nausea.   -Lightheadedness when bending over or when standing up too quickly.    History of balance issues; seen in vestibular rehab.   -No history of seizures.   -Ongoing abdominal pain. Emmy notes that his limits his activity.       MEDICATIONS   Current Outpatient Medications   Medication Sig Dispense Refill     acetaminophen (TYLENOL) 500 MG tablet Take 500 mg by mouth       adalimumab (HUMIRA *CF* PEN) 40 MG/0.4ML pen kit Inject 0.4 mLs (40 mg) Subcutaneous every 7 days 4 each 5     AJOVY 225 MG/1.5ML SOAJ Inject 1 subcutaneous injection per month       amitriptyline (ELAVIL) 10 MG tablet Take 10 mg by mouth daily       Ascorbic Acid (VITAMIN C) 500 MG CHEW Take 500 mg by mouth every morning       cholecalciferol (VITAMIN D3) 125 mcg (5000 units) capsule Take 2 capsules by mouth daily       cyanocobalamin (VITAMIN B-12) 1000 MCG tablet Take 1,000 mcg by mouth daily       diclofenac (VOLTAREN) 1 % topical gel Apply 2 g topically 4 times daily 100 g 11     dicyclomine (BENTYL) 10 MG capsule Take 1 capsule (10 mg) by mouth 4 times daily as needed (abdominal pain) 270 capsule 3     eletriptan (RELPAX) 40 MG tablet Take 40 mg by mouth at onset of headache for migraine Ave per month 2 tabs       fexofenadine (ALLEGRA) 180 MG tablet Take 180 mg by mouth       fluticasone (FLONASE) 50 MCG/ACT nasal spray 1 spray       hyoscyamine ER (LEVBID) 375 mcg 12 hr tablet Take 0.375 mg by mouth every 12 hours as needed        ondansetron (ZOFRAN-ODT) 4 MG ODT tab Take 4 mg by mouth every 6 hours as needed        tadalafil  (CIALIS) 20 MG tablet Take 20 mg by mouth       DRUG ALLERGIES No Known Allergies      IMMUNIZATIONS   Immunization History   Administered Date(s) Administered     COVID-19 Monovalent 18+ (Moderna) 04/22/2021, 05/20/2021, 09/21/2021     Flu, Unspecified 11/14/2014     HEPA 01/01/1999     Hepatitis A (ADULT 19+) 01/04/2019     Hepatitis B, Adult 01/04/2019, 05/15/2019, 07/15/2019     Influenza Vaccine 18-64 (Flublok) 11/13/2020, 11/30/2022     Influenza Vaccine, 6+MO IM (QUADRIVALENT W/PRESERVATIVES) 09/22/2017, 09/26/2018     Pneumo Conj 13-V (2010&after) 01/04/2019     TD,PF 7+ (Tenivac) 01/01/1999     TDAP (Adacel,Boostrix) 12/24/2016, 08/07/2020     PAST MEDICAL HISTORY   Past Medical History:   Diagnosis Date     Crohn's disease (H) 2018     Dysplastic nevus      ED (erectile dysfunction)      Fatty liver      Gastroesophageal reflux disease with esophagitis      Migraine      Plantar fasciitis      PONV (postoperative nausea and vomiting)      PVC's (premature ventricular contractions)      Seasonal allergic rhinitis      Sleep apnea      PAST SURGICAL HISTORY   Past Surgical History:   Procedure Laterality Date     COLONOSCOPY N/A 6/22/2022    Procedure: COLONOSCOPY, WITH POLYPECTOMY AND BIOPSY;  Surgeon: Ariel Drew MD;  Location:  GI     ESOPHAGOSCOPY, GASTROSCOPY, DUODENOSCOPY (EGD), COMBINED N/A 6/22/2022    Procedure: ESOPHAGOGASTRODUODENOSCOPY, WITH BIOPSY;  Surgeon: Ariel Drew MD;  Location:  GI     IR LUMBAR PUNCTURE  5/21/2024     NISSEN FUNDOPLICATION N/A 2016    x 2     SOCIAL HISTORY   History   Smoking Status     Never   Smokeless Tobacco     Never    Social History    Substance and Sexual Activity      Alcohol use: Never     History   Drug Use Not on file     FAMILY HISTORY   Family History   Problem Relation Age of Onset     Diabetes Maternal Grandfather      Coronary Artery Disease Paternal Grandfather        PHYSICAL EXAMINATION  BP (!) 143/84 (BP Location:  "Right arm, Patient Position: Sitting, Cuff Size: Adult Regular)   Pulse 96   Temp 98.9  F (37.2  C) (Oral)   Resp 16   Ht 1.865 m (6' 1.43\")   Wt 112 kg (246 lb 14.2 oz)   SpO2 100%   BMI 32.20 kg/m     Wt Readings from Last 2 Encounters:   06/14/24 112 kg (246 lb 14.2 oz)   12/27/23 107.9 kg (237 lb 12.8 oz)      Ht Readings from Last 2 Encounters:   06/14/24 1.865 m (6' 1.43\")   12/27/23 1.854 m (6' 1\")     KPS:     -Generally well appearing.  -Respiratory: Normal breath sounds, no audible wheezing.   -Psychiatric: Normal mood and affect. Pleasant, talkative.  -Neurologic:   MENTAL STATUS:     Alert, oriented to date.    Recall: Intact    Speech fluent.   Comprehension intact to multi-step commands.   Good right-left orientation.     CRANIAL NERVES:     Pupils are equal, round.     Extraocular movements full, denies diplopia.     Visual fields full.     Facial sensation intact to light touch.   Symmetric facial movements.   Hearing intact.   No dysarthria.   MOTOR:    No pronation or drift.   Able to rise from a chair without use of arms.   On toe/ heel walk, equal distance from floor to heels/ toes.   SENSATION: Intact to light touch throughout.   Except over the distribution of the left lateral cutaneous nerve.   COORDINATION: Intact finger-nose with eyes closed.   GAIT:  Walks without assistance.   Good speed. Normal stride length and heel strike. Normal turns. Normal arm swing.   Able to toe, heel walk. Able to tandem walk.       MEDICAL RECORDS  Personally reviewed [facility]y records [facility]; management of migraine headaches, LP puncture.     LABS  Personally reviewed all available lab results;   5/21: Cerebrospinal fluid, lumbar puncture - No malignant cells identified   Albumin, CSF  0 - 35 mg/dL 15   Immunoglobulin G  768 - 1632 mg/dL 983   Immunoglobulin G CSF  0.0 - 6.0 mg/dL 1.8   Albumin, Serum  3500 - 5200 mg/dL 3,872   Albumin Index  0.0 - 9.0 ratio 3.9   CSF IgG Synthesis " Rate  <=8.0 mg/d <0.0   CSF Oligoclonal Bands  Negative Negative     IMAGING  Personally reviewed recent MR brain imaging and compared it to imaging performed in 2015 (below);    Stable nonenhancing T2 hyperintense focus along the posterior body of the left corpus callosum. Differential considerations include demyelinating plaque, low-grade glioma, or possibly gliosis.         Imaging was shown to and results were reviewed with Celio.       IMPRESSION  On date of service, 63 minutes was spent in clinic and 22 minutes was spent preparing for the visit through extensive chart review and coordinating care for this high complexity visit. The following is in explanation for the recommendations used to define the plan.       It was explained to Monty and Emmy that imaging demonstrates a small T2 hyperintense signal abnormality in the brain that is without mass effect or contrast enhancement and has been stable for ~10 years on subsequent MR brain scans. While no imaging modality is diagnostic and definite diagnosis can only be made on review of the lesion's pathology following a surgery, these radiographic characteristics are not at all consistent with a tumor. A demyelinating plaque was on the differential, but CSF work-up and spine imaging was negative plus there have been no new lesions appear over the past 10 years. As a result, gliosis from a prior healed insult seems most likely.     It is my impression that given the size and location, this lesion would not localize to Monty' complaints of headache, dizziness, fatigue, numbness, or vision changes. The pattern of numbness about the left lateral thigh is consistent with meralgia paresthetica. This numbness could resolve with time; please see recommendations below.    So, since he is asymptomatic with regard to this lesion, imaging has remained stable for 10 years, and there are no new concerning radiographic findings on the most recent scan, there is no  intervention, diagnostic or therapeutic, indicated at this time. I explained that the risks associated with a biopsy/ resection out weigh the benefit of obtaining a diagnosis at this time. Therefore, it would be most advantageous to repeat MR brain imaging in 2 years. If imaging is again stable in 2026, then there can be consideration for stopping surveillance imaging.     I personally reviewed Monty' imaging and case at Brain Tumor Conference and all in attendance were in agreement with this impression.    PROBLEM LIST  Brain lesion NOS  Abnormality of MR brain imaging  Crohn's colitis  Migraines   CAROLE on CPAP    PLAN  -BRAIN LESION NOS-  -As above; Repeat MRI in 24 months.    -MERALGIA PARESTHETICA-  -Recommend ways to reduce pressure over the nerve in the groin area; such as avoiding tight garments and belts.   Weight loss will be helpful.   -No additional imaging/ work-up needed at this time.      Return to clinic in 5/2026 + imaging.     Rosita Jones MD  Neuro-oncology        Again, thank you for allowing me to participate in the care of your patient.        Sincerely,        Rosita Jones MD

## 2024-06-17 ENCOUNTER — TUMOR CONFERENCE (OUTPATIENT)
Dept: ONCOLOGY | Facility: CLINIC | Age: 49
End: 2024-06-17
Payer: COMMERCIAL

## 2024-06-26 ENCOUNTER — OFFICE VISIT (OUTPATIENT)
Dept: GASTROENTEROLOGY | Facility: CLINIC | Age: 49
End: 2024-06-26
Attending: INTERNAL MEDICINE
Payer: COMMERCIAL

## 2024-06-26 VITALS
WEIGHT: 245.9 LBS | OXYGEN SATURATION: 97 % | DIASTOLIC BLOOD PRESSURE: 83 MMHG | BODY MASS INDEX: 32.07 KG/M2 | SYSTOLIC BLOOD PRESSURE: 134 MMHG

## 2024-06-26 DIAGNOSIS — K50.80 CROHN'S DISEASE OF BOTH SMALL AND LARGE INTESTINE WITHOUT COMPLICATION (H): Primary | ICD-10-CM

## 2024-06-26 PROCEDURE — 99214 OFFICE O/P EST MOD 30 MIN: CPT | Performed by: INTERNAL MEDICINE

## 2024-06-26 ASSESSMENT — PAIN SCALES - GENERAL: PAINLEVEL: MODERATE PAIN (4)

## 2024-06-26 NOTE — PATIENT INSTRUCTIONS
Please have a humira trough level drawn - we can do all your labs at that time.  Please have an abdominal x-ray

## 2024-06-26 NOTE — PROGRESS NOTES
GI CLINIC VISIT    CC/REFERRING MD:  Ruby Beck  REASON FOR CONSULTATION:   Ruby Beck for   Chief Complaint   Patient presents with    Follow Up         HPI    Monty presents today with his wife for follow-up.  Overall symptoms are the same - continues to struggle with abdominal pain and bloating.  Is having bowel movements - 5-6 a day - alternate between loose and formed - most of the time doesn't feel like he empties all the way. Was previously on linzess for constipation - stopped due to diarrhea.  Now just working on fiber intake.  Fatigue remains an issue - is the worst on the day after he takes him humira (Saturday).      IBD History:  Time of diagnosis: 2018  Extent of disease: ileocolonic  Phenotype: stricturing  Previous therapies: humira at [facility] - now receiving weekly  EIM: joint pains  Previous surgeries: none for crohns, did have nissen in the past  Most recent endoscopy: 2022  Perianal skin tags found on perianal exam.                             - The examined portion of the ileum was normal.                             - Scarring with distortion of the IC valve and the                             cecum was found. The IC valve was wide open and                             passed easily with the pediatric colonoscope. There                             was some granularity and small pseudopolyps at the                             IC valve but no active inflammation. . Biopsied.                             - A single ulcer in the transverse colon. Biopsied.                             - One 2 mm polyp in the ascending colon, removed                             with a cold biopsy forceps. Resected and retrieved.                             - One 3 mm polyp in the sigmoid colon, removed with                             a cold biopsy forceps. Resected and retrieved.                             - Diverticulosis in the sigmoid colon.                             - Simple Endoscopic Score for  Crohn's Disease: 3,                             mucosal inflammatory changes secondary to Crohn's                             disease. Biopsied.                             Overall there is minimal evidence of any Crohn's                             Disease activity in the colon. One small 4 mm                             ulcer. Otherwise no activity.   Impression:               - Normal esophagus.                             - Z-line regular, 43 cm from the incisors.                             - A Nissen fundoplication was found. The wrap                             appears intact.                             - A patch of friable gastric mucosa in the distal                             gastric body. Biopsied.                             - Normal examined duodenum. Biopsied.                             Overall unremarkable exam. Mild friability in the                             gastric body was biopsied. Can consider trial of                             omeprazole 40 mg daily for 2 months to see if this                             offers any benefit to chronic epigastric/LUQ pain.      A.  Duodenum: Biopsy:  - Duodenal mucosa within normal limits  - Normal villous architecture with no increase in intraepithelial lymphocytes      B.  Stomach: Biopsy:  - Oxyntic type mucosa with mucosal erosion and reactive epithelial changes  - Antral type mucosa within normal limits  - Immunostain for Helicobacter pylori is in process, with the results to be reported in an addendum      C.  Ileocecal valve: Biopsy:  - Chronic, focally active colitis with architectural disarray, basal plasmacytosis, and focal active cryptitis  - No dysplasia     D.  Colon, ascending: Polypectomy:  - Tubular adenoma  - No evidence of high-grade dysplasia or invasive malignancy      E.  Colon, cecum/ascending: Biopsy:  - Colonic mucosa within normal limits  - No active or chronic colitis  - No dysplasia      F.  Colon, transverse: Biopsy:  - Colonic  mucosa within normal limits  - No active or chronic colitis  - No dysplasia      G.  Colon, transverse, ulcer: Biopsy:  - Colonic mucosa with lamina propria hyalinization, withered crypts, and focal active inflammation, see comment     H.  Colon, descending/sigmoid: Biopsy:  - Colonic mucosa within normal limits  - No active or chronic colitis  - No dysplasia       I.  Colon, sigmoid: Polypectomy:  - Tubular adenoma  - No evidence of high-grade dysplasia or invasive malignancy       J.  Rectum: Biopsy:  - Colonic mucosa within normal limits  - No active or chronic colitis  - No dysplasia      ROS:    No fevers or chills  No weight loss  No blurry vision, double vision or change in vision  No sore throat  No lymphadenopathy  No headache, paraesthesias, or weakness in a limb  No shortness of breath or wheezing  No chest pain or pressure  No arthralgias or myalgias  No rashes or skin changes  No odynophagia or dysphagia  No BRBPR, hematochezia, melena  No dysuria, frequency or urgency  No hot/cold intolerance or polyria  No anxiety or depression    PROBLEM LIST  Patient Active Problem List    Diagnosis Date Noted    GERD (gastroesophageal reflux disease) 05/04/2023     Priority: Medium    History of pneumonia 05/04/2023     Priority: Medium    History of irritable bowel syndrome 05/04/2023     Priority: Medium    Insufficient sleep syndrome 10/01/2021     Priority: Medium    Hyperglycemia 06/02/2019     Priority: Medium    Crohn's disease of colon with complication (H) 01/04/2019     Priority: Medium    Chronic bursitis of left shoulder 09/26/2018     Priority: Medium    Allergic rhinitis 09/30/2017     Priority: Medium    Fatigue 03/20/2017     Priority: Medium    Dysphagia, unspecified 10/13/2015     Priority: Medium    ED (erectile dysfunction) 09/19/2015     Priority: Medium    Hypogonadism male 09/19/2015     Priority: Medium    CAROLE on CPAP 09/19/2015     Priority: Medium    Swallowing dysfunction 09/19/2015      Priority: Medium    Ventricular arrhythmia 09/19/2015     Priority: Medium    Vitamin D deficiency 09/19/2015     Priority: Medium    Bursitis, infrapatellar or subpatellar 08/14/2015     Priority: Medium    Tendonitis of ankle, left 08/14/2015     Priority: Medium    Cough, persistent 05/18/2015     Priority: Medium    Hearing loss on left 05/18/2015     Priority: Medium    Abnormal MRI of head 12/31/2014     Priority: Medium    Migraine headache 12/31/2014     Priority: Medium    Peripheral vestibulopathy 03/14/2014     Priority: Medium     Formatting of this note might be different from the original.  Left ear, symptoms resolved with vestibular rehab         PERTINENT PAST MEDICAL HISTORY:  Past Medical History:   Diagnosis Date    Crohn's disease (H) 2018    Dysplastic nevus     ED (erectile dysfunction)     Fatty liver     Gastroesophageal reflux disease with esophagitis     Migraine     Plantar fasciitis     PONV (postoperative nausea and vomiting)     PVC's (premature ventricular contractions)     Seasonal allergic rhinitis     Sleep apnea        PREVIOUS SURGERIES:  Past Surgical History:   Procedure Laterality Date    COLONOSCOPY N/A 6/22/2022    Procedure: COLONOSCOPY, WITH POLYPECTOMY AND BIOPSY;  Surgeon: Ariel Drew MD;  Location:  GI    ESOPHAGOSCOPY, GASTROSCOPY, DUODENOSCOPY (EGD), COMBINED N/A 6/22/2022    Procedure: ESOPHAGOGASTRODUODENOSCOPY, WITH BIOPSY;  Surgeon: Ariel Drew MD;  Location: [facility]    IR LUMBAR PUNCTURE  5/21/2024    NISSEN FUNDOPLICATION N/A 2016    x 2         ALLERGIES:   No Known Allergies    PERTINENT MEDICATIONS:    Current Outpatient Medications:     acetaminophen (TYLENOL) 500 MG tablet, Take 500 mg by mouth, Disp: , Rfl:     adalimumab (HUMIRA *CF* PEN) 40 MG/0.4ML pen kit, Inject 0.4 mLs (40 mg) Subcutaneous every 7 days, Disp: 4 each, Rfl: 5    AJOVY 225 MG/1.5ML SOAJ, Inject 1 subcutaneous injection per month, Disp: , Rfl:     amitriptyline  (ELAVIL) 10 MG tablet, Take 10 mg by mouth daily, Disp: , Rfl:     Ascorbic Acid (VITAMIN C) 500 MG CHEW, Take 500 mg by mouth every morning, Disp: , Rfl:     cholecalciferol (VITAMIN D3) 125 mcg (5000 units) capsule, Take 2 capsules by mouth daily, Disp: , Rfl:     cyanocobalamin (VITAMIN B-12) 1000 MCG tablet, Take 1,000 mcg by mouth daily, Disp: , Rfl:     diclofenac (VOLTAREN) 1 % topical gel, Apply 2 g topically 4 times daily, Disp: 100 g, Rfl: 11    dicyclomine (BENTYL) 10 MG capsule, Take 1 capsule (10 mg) by mouth 4 times daily as needed (abdominal pain), Disp: 270 capsule, Rfl: 3    eletriptan (RELPAX) 40 MG tablet, Take 40 mg by mouth at onset of headache for migraine Ave per month 2 tabs, Disp: , Rfl:     fexofenadine (ALLEGRA) 180 MG tablet, Take 180 mg by mouth, Disp: , Rfl:     fluticasone (FLONASE) 50 MCG/ACT nasal spray, 1 spray, Disp: , Rfl:     hyoscyamine ER (LEVBID) 375 mcg 12 hr tablet, Take 0.375 mg by mouth every 12 hours as needed , Disp: , Rfl:     ondansetron (ZOFRAN-ODT) 4 MG ODT tab, Take 4 mg by mouth every 6 hours as needed , Disp: , Rfl:     tadalafil (CIALIS) 20 MG tablet, Take 20 mg by mouth, Disp: , Rfl:     SOCIAL HISTORY:  Social History     Socioeconomic History    Marital status:      Spouse name: Not on file    Number of children: Not on file    Years of education: Not on file    Highest education level: Not on file   Occupational History    Not on file   Tobacco Use    Smoking status: Never    Smokeless tobacco: Never   Vaping Use    Vaping status: Never Used   Substance and Sexual Activity    Alcohol use: Never    Drug use: Not on file    Sexual activity: Yes     Partners: Female   Other Topics Concern    Not on file   Social History Narrative    Not on file     Social Determinants of Health     Financial Resource Strain: Not on file   Food Insecurity: Not on file   Transportation Needs: Not on file   Physical Activity: Not on file   Stress: Not on file   Social  Connections: Not on file   Interpersonal Safety: Not on file   Housing Stability: Not on file       FAMILY HISTORY:  Family History   Problem Relation Age of Onset    Diabetes Maternal Grandfather     Coronary Artery Disease Paternal Grandfather        Past/family/social history reviewed and no changes    PHYSICAL EXAMINATION:  Constitutional: aaox3, cooperative, pleasant, not dyspneic/diaphoretic, no acute distress  Vitals reviewed: /83 (BP Location: Left arm, Patient Position: Sitting, Cuff Size: Adult Large)   Wt 111.5 kg (245 lb 14.4 oz)   SpO2 97%   BMI 32.07 kg/m    Wt:   Wt Readings from Last 2 Encounters:   06/26/24 111.5 kg (245 lb 14.4 oz)   06/14/24 112 kg (246 lb 14.2 oz)      Eyes: Sclera anicteric/injected  Ears/nose/mouth/throat: Normal oropharynx without ulcers or exudate, mucus membranes moist, hearing intact  Neck: supple, thyroid normal size  CV: No edema  Respiratory: Unlabored breathing  Skin: warm, perfused, no jaundice  Psych: Normal affect  MSK: Normal gait      PERTINENT STUDIES:  Most recent CBC:  Recent Labs   Lab Test 12/27/23  0959 04/07/23  1319   WBC 6.2 5.8   HGB 15.6 15.2   HCT 46.1 44.3    217     Most recent hepatic panel:  Recent Labs   Lab Test 12/27/23  0959 04/07/23  1319   ALT 37 54*   AST 30 40     Most recent creatinine:  Recent Labs   Lab Test 12/27/23  0959 04/07/23  1319   CR 0.95 0.81       ASSESSMENT/PLAN:    # crohns ileocolitis - on humira q7 days.  Will check trough level and antibodies. Will also get AXR to assess stool burden given alternating diarrhea/constipation.     RTC 6 months    Vaccinations:  -- Influenza (every year): got this year  -- TdaP (every 10 years): received last year  -- Pneumococcal Pneumonia (once then every 5 years): Last given will address at next visit - received prevnar in 2019 - will discuss pneumovax  -- Yearly assessment for latent Tb (verbal screening and exam, PPD or QuantiFERON-Tb testing): Last obtained 2018  negative  - Covid vaccine - received 2021     One time confirmation of immunity or serologies:  -- Hepatitis A (serologies or immunizations): will discuss vaccine at next visit  -- Hepatitis B (serologies or immunizations): negative in 2018 - will discuss vaccine at next visit  -- MMR: will address at next visit  -- HPV (all aged 18-26): n/a  -- Meningococcal meningitis (all patients at risk for meningitis): N/a  -- Due to the immunosuppression in this patient, I would not advise administration of live vaccines such as varicella/VZV, intranasal influenza, MMR, or yellow fever vaccine (if travelling).       Bone mineral density screening   -- Recommend all patients supplement with calcium and vitamin D  -- Given prior steroid use recommend DEXA if not already done     Cancer Screening:  Colon cancer screening:     Skin cancer screening: Annual visual exam of skin by dermatologist since patient is immunocompromised - discussed with patient today given his history - referral placed to dermatology      Admission

## 2024-06-26 NOTE — LETTER
6/26/2024      Monty Fleming  [address] [facility] [place] [address]      Dear Colleague,    Thank you for referring your patient, Monty Fleming, to the [facility]. Please see a copy of my visit note below.    GI CLINIC VISIT    CC/REFERRING MD:  Ruby Beck  REASON FOR CONSULTATION:   Ruby Beck for   Chief Complaint   Patient presents with     Follow Up         HPI    Monty presents today with his wife for follow-up.  Overall symptoms are the same - continues to struggle with abdominal pain and bloating.  Is having bowel movements - 5-6 a day - alternate between loose and formed - most of the time doesn't feel like he empties all the way. Was previously on linzess for constipation - stopped due to diarrhea.  Now just working on fiber intake.  Fatigue remains an issue - is the worst on the day after he takes him humira (Saturday).      IBD History:  Time of diagnosis: 2018  Extent of disease: ileocolonic  Phenotype: stricturing  Previous therapies: humira at [id number] - now receiving weekly  EIM: joint pains  Previous surgeries: none for crohns, did have nissen in the past  Most recent endoscopy: 2022  Perianal skin tags found on perianal exam.                             - The examined portion of the ileum was normal.                             - Scarring with distortion of the IC valve and the                             cecum was found. The IC valve was wide open and                             passed easily with the pediatric colonoscope. There                             was some granularity and small pseudopolyps at the                             IC valve but no active inflammation. . Biopsied.                             - A single ulcer in the transverse colon. Biopsied.                             - One 2 mm polyp in the ascending colon, removed                             with a cold biopsy forceps. Resected and retrieved.                             - One 3 mm polyp in the  sigmoid colon, removed with                             a cold biopsy forceps. Resected and retrieved.                             - Diverticulosis in the sigmoid colon.                             - Simple Endoscopic Score for Crohn's Disease: 3,                             mucosal inflammatory changes secondary to Crohn's                             disease. Biopsied.                             Overall there is minimal evidence of any Crohn's                             Disease activity in the colon. One small 4 mm                             ulcer. Otherwise no activity.   Impression:               - Normal esophagus.                             - Z-line regular, 43 cm from the incisors.                             - A Nissen fundoplication was found. The wrap                             appears intact.                             - A patch of friable gastric mucosa in the distal                             gastric body. Biopsied.                             - Normal examined duodenum. Biopsied.                             Overall unremarkable exam. Mild friability in the                             gastric body was biopsied. Can consider trial of                             omeprazole 40 mg daily for 2 months to see if this                             offers any benefit to chronic epigastric/LUQ pain.      A.  Duodenum: Biopsy:  - Duodenal mucosa within normal limits  - Normal villous architecture with no increase in intraepithelial lymphocytes      B.  Stomach: Biopsy:  - Oxyntic type mucosa with mucosal erosion and reactive epithelial changes  - Antral type mucosa within normal limits  - Immunostain for Helicobacter pylori is in process, with the results to be reported in an addendum      C.  Ileocecal valve: Biopsy:  - Chronic, focally active colitis with architectural disarray, basal plasmacytosis, and focal active cryptitis  - No dysplasia     D.  Colon, ascending: Polypectomy:  - Tubular adenoma  - No  evidence of high-grade dysplasia or invasive malignancy      E.  Colon, cecum/ascending: Biopsy:  - Colonic mucosa within normal limits  - No active or chronic colitis  - No dysplasia      F.  Colon, transverse: Biopsy:  - Colonic mucosa within normal limits  - No active or chronic colitis  - No dysplasia      G.  Colon, transverse, ulcer: Biopsy:  - Colonic mucosa with lamina propria hyalinization, withered crypts, and focal active inflammation, see comment     H.  Colon, descending/sigmoid: Biopsy:  - Colonic mucosa within normal limits  - No active or chronic colitis  - No dysplasia       I.  Colon, sigmoid: Polypectomy:  - Tubular adenoma  - No evidence of high-grade dysplasia or invasive malignancy       J.  Rectum: Biopsy:  - Colonic mucosa within normal limits  - No active or chronic colitis  - No dysplasia      ROS:    No fevers or chills  No weight loss  No blurry vision, double vision or change in vision  No sore throat  No lymphadenopathy  No headache, paraesthesias, or weakness in a limb  No shortness of breath or wheezing  No chest pain or pressure  No arthralgias or myalgias  No rashes or skin changes  No odynophagia or dysphagia  No BRBPR, hematochezia, melena  No dysuria, frequency or urgency  No hot/cold intolerance or polyria  No anxiety or depression    PROBLEM LIST  Patient Active Problem List    Diagnosis Date Noted     GERD (gastroesophageal reflux disease) 05/04/2023     Priority: Medium     History of pneumonia 05/04/2023     Priority: Medium     History of irritable bowel syndrome 05/04/2023     Priority: Medium     Insufficient sleep syndrome 10/01/2021     Priority: Medium     Hyperglycemia 06/02/2019     Priority: Medium     Crohn's disease of colon with complication (H) 01/04/2019     Priority: Medium     Chronic bursitis of left shoulder 09/26/2018     Priority: Medium     Allergic rhinitis 09/30/2017     Priority: Medium     Fatigue 03/20/2017     Priority: Medium     Dysphagia,  unspecified 10/13/2015     Priority: Medium     ED (erectile dysfunction) 09/19/2015     Priority: Medium     Hypogonadism male 09/19/2015     Priority: Medium     CAROLE on CPAP 09/19/2015     Priority: Medium     Swallowing dysfunction 09/19/2015     Priority: Medium     Ventricular arrhythmia 09/19/2015     Priority: Medium     Vitamin D deficiency 09/19/2015     Priority: Medium     Bursitis, infrapatellar or subpatellar 08/14/2015     Priority: Medium     Tendonitis of ankle, left 08/14/2015     Priority: Medium     Cough, persistent 05/18/2015     Priority: Medium     Hearing loss on left 05/18/2015     Priority: Medium     Abnormal MRI of head 12/31/2014     Priority: Medium     Migraine headache 12/31/2014     Priority: Medium     Peripheral vestibulopathy 03/14/2014     Priority: Medium     Formatting of this note might be different from the original.  Left ear, symptoms resolved with vestibular rehab         PERTINENT PAST MEDICAL HISTORY:  Past Medical History:   Diagnosis Date     Crohn's disease (H) 2018     Dysplastic nevus      ED (erectile dysfunction)      Fatty liver      Gastroesophageal reflux disease with esophagitis      Migraine      Plantar fasciitis      PONV (postoperative nausea and vomiting)      PVC's (premature ventricular contractions)      Seasonal allergic rhinitis      Sleep apnea        PREVIOUS SURGERIES:  Past Surgical History:   Procedure Laterality Date     COLONOSCOPY N/A 6/22/2022    Procedure: COLONOSCOPY, WITH POLYPECTOMY AND BIOPSY;  Surgeon: Ariel Drew MD;  Location:  GI     ESOPHAGOSCOPY, GASTROSCOPY, DUODENOSCOPY (EGD), COMBINED N/A 6/22/2022    Procedure: ESOPHAGOGASTRODUODENOSCOPY, WITH BIOPSY;  Surgeon: Ariel Drew MD;  Location:  GI     IR LUMBAR PUNCTURE  5/21/2024     NISSEN FUNDOPLICATION N/A 2016    x 2         ALLERGIES:   No Known Allergies    PERTINENT MEDICATIONS:    Current Outpatient Medications:      acetaminophen (TYLENOL)  500 MG tablet, Take 500 mg by mouth, Disp: , Rfl:      adalimumab (HUMIRA *CF* PEN) 40 MG/0.4ML pen kit, Inject 0.4 mLs (40 mg) Subcutaneous every 7 days, Disp: 4 each, Rfl: 5     AJOVY 225 MG/1.5ML SOAJ, Inject 1 subcutaneous injection per month, Disp: , Rfl:      amitriptyline (ELAVIL) 10 MG tablet, Take 10 mg by mouth daily, Disp: , Rfl:      Ascorbic Acid (VITAMIN C) 500 MG CHEW, Take 500 mg by mouth every morning, Disp: , Rfl:      cholecalciferol (VITAMIN D3) 125 mcg (5000 units) capsule, Take 2 capsules by mouth daily, Disp: , Rfl:      cyanocobalamin (VITAMIN B-12) 1000 MCG tablet, Take 1,000 mcg by mouth daily, Disp: , Rfl:      diclofenac (VOLTAREN) 1 % topical gel, Apply 2 g topically 4 times daily, Disp: 100 g, Rfl: 11     dicyclomine (BENTYL) 10 MG capsule, Take 1 capsule (10 mg) by mouth 4 times daily as needed (abdominal pain), Disp: 270 capsule, Rfl: 3     eletriptan (RELPAX) 40 MG tablet, Take 40 mg by mouth at onset of headache for migraine Ave per month 2 tabs, Disp: , Rfl:      fexofenadine (ALLEGRA) 180 MG tablet, Take 180 mg by mouth, Disp: , Rfl:      fluticasone (FLONASE) 50 MCG/ACT nasal spray, 1 spray, Disp: , Rfl:      hyoscyamine ER (LEVBID) 375 mcg 12 hr tablet, Take 0.375 mg by mouth every 12 hours as needed , Disp: , Rfl:      ondansetron (ZOFRAN-ODT) 4 MG ODT tab, Take 4 mg by mouth every 6 hours as needed , Disp: , Rfl:      tadalafil (CIALIS) 20 MG tablet, Take 20 mg by mouth, Disp: , Rfl:     SOCIAL HISTORY:  Social History     Socioeconomic History     Marital status:      Spouse name: Not on file     Number of children: Not on file     Years of education: Not on file     Highest education level: Not on file   Occupational History     Not on file   Tobacco Use     Smoking status: Never     Smokeless tobacco: Never   Vaping Use     Vaping status: Never Used   Substance and Sexual Activity     Alcohol use: Never     Drug use: Not on file     Sexual activity: Yes     Partners:  Female   Other Topics Concern     Not on file   Social History Narrative     Not on file     Social Determinants of Health     Financial Resource Strain: Not on file   Food Insecurity: Not on file   Transportation Needs: Not on file   Physical Activity: Not on file   Stress: Not on file   Social Connections: Not on file   Interpersonal Safety: Not on file   Housing Stability: Not on file       FAMILY HISTORY:  Family History   Problem Relation Age of Onset     Diabetes Maternal Grandfather      Coronary Artery Disease Paternal Grandfather        Past/family/social history reviewed and no changes    PHYSICAL EXAMINATION:  Constitutional: aaox3, cooperative, pleasant, not dyspneic/diaphoretic, no acute distress  Vitals reviewed: /83 (BP Location: Left arm, Patient Position: Sitting, Cuff Size: Adult Large)   Wt 111.5 kg (245 lb 14.4 oz)   SpO2 97%   BMI 32.07 kg/m    Wt:   Wt Readings from Last 2 Encounters:   06/26/24 111.5 kg (245 lb 14.4 oz)   06/14/24 112 kg (246 lb 14.2 oz)      Eyes: Sclera anicteric/injected  Ears/nose/mouth/throat: Normal oropharynx without ulcers or exudate, mucus membranes moist, hearing intact  Neck: supple, thyroid normal size  CV: No edema  Respiratory: Unlabored breathing  Skin: warm, perfused, no jaundice  Psych: Normal affect  MSK: Normal gait      PERTINENT STUDIES:  Most recent CBC:  Recent Labs   Lab Test 12/27/23  0959 04/07/23  1319   WBC 6.2 5.8   HGB 15.6 15.2   HCT 46.1 44.3    217     Most recent hepatic panel:  Recent Labs   Lab Test 12/27/23  0959 04/07/23  1319   ALT 37 54*   AST 30 40     Most recent creatinine:  Recent Labs   Lab Test 12/27/23  0959 04/07/23  1319   CR 0.95 0.81       ASSESSMENT/PLAN:    # crohns ileocolitis - on humira q7 days.  Will check trough level and antibodies. Will also get AXR to assess stool burden given alternating diarrhea/constipation.     RTC 6 months    Vaccinations:  -- Influenza (every year): got this year  -- TdaP (every  10 years): received last year  -- Pneumococcal Pneumonia (once then every 5 years): Last given will address at next visit - received prevnar in 2019 - will discuss pneumovax  -- Yearly assessment for latent Tb (verbal screening and exam, PPD or QuantiFERON-Tb testing): Last obtained 2018 negative  - Covid vaccine - received 2021     One time confirmation of immunity or serologies:  -- Hepatitis A (serologies or immunizations): will discuss vaccine at next visit  -- Hepatitis B (serologies or immunizations): negative in 2018 - will discuss vaccine at next visit  -- MMR: will address at next visit  -- HPV (all aged 18-26): n/a  -- Meningococcal meningitis (all patients at risk for meningitis): N/a  -- Due to the immunosuppression in this patient, I would not advise administration of live vaccines such as varicella/VZV, intranasal influenza, MMR, or yellow fever vaccine (if travelling).       Bone mineral density screening   -- Recommend all patients supplement with calcium and vitamin D  -- Given prior steroid use recommend DEXA if not already done     Cancer Screening:  Colon cancer screening:     Skin cancer screening: Annual visual exam of skin by dermatologist since patient is immunocompromised - discussed with patient today given his history - referral placed to dermatology         Again, thank you for allowing me to participate in the care of your patient.        Sincerely,        Ruby Beck DO

## 2024-06-26 NOTE — PROGRESS NOTES
Humira level and antibody order entered per provider request.  [employer] form request sent to Prachi Sterling LPN to be completed and scanned in to chart.    Melani Avery RN

## 2024-06-27 ENCOUNTER — DOCUMENTATION ONLY (OUTPATIENT)
Dept: GASTROENTEROLOGY | Facility: CLINIC | Age: 49
End: 2024-06-27
Payer: COMMERCIAL

## 2024-06-27 NOTE — PROGRESS NOTES
[employer] form filled out and scanned into patient chart. Lab order is in. Added to patient list. Copy also sent via fax to [employer] at [phone].    ERIBERTO Hou Angie, RN Lacher, Stacy, LPN Hello,    Can you please fill out and scan into the patients chart a [employer] lab order form for this patient.    Medication:  Humira  Dose: 40mg  Frequency: every week  Provider: Dr. Beck  When is lab due: Friday before his next injection within the next 2-3 weeks  Does the patient need a lab appointment: Yes, patient is aware that he needs to make an appt    Thank you

## 2024-06-29 ENCOUNTER — MEDICAL CORRESPONDENCE (OUTPATIENT)
Dept: HEALTH INFORMATION MANAGEMENT | Facility: CLINIC | Age: 49
End: 2024-06-29
Payer: COMMERCIAL

## 2024-07-09 ENCOUNTER — ANCILLARY PROCEDURE (OUTPATIENT)
Dept: GENERAL RADIOLOGY | Facility: CLINIC | Age: 49
End: 2024-07-09
Attending: INTERNAL MEDICINE
Payer: COMMERCIAL

## 2024-07-09 DIAGNOSIS — K50.80 CROHN'S DISEASE OF BOTH SMALL AND LARGE INTESTINE WITHOUT COMPLICATION (H): ICD-10-CM

## 2024-07-09 PROCEDURE — 74019 RADEX ABDOMEN 2 VIEWS: CPT | Performed by: RADIOLOGY

## 2024-07-10 DIAGNOSIS — K59.00 CONSTIPATION, UNSPECIFIED CONSTIPATION TYPE: Primary | ICD-10-CM

## 2024-07-12 ENCOUNTER — LAB (OUTPATIENT)
Dept: LAB | Facility: CLINIC | Age: 49
End: 2024-07-12
Payer: COMMERCIAL

## 2024-07-12 DIAGNOSIS — K50.80 CROHN'S DISEASE OF BOTH SMALL AND LARGE INTESTINE WITHOUT COMPLICATION (H): ICD-10-CM

## 2024-07-12 LAB
ALBUMIN SERPL BCG-MCNC: 4.3 G/DL (ref 3.5–5.2)
ALP SERPL-CCNC: 83 U/L (ref 40–150)
ALT SERPL W P-5'-P-CCNC: 84 U/L (ref 0–70)
ANION GAP SERPL CALCULATED.3IONS-SCNC: 17 MMOL/L (ref 7–15)
AST SERPL W P-5'-P-CCNC: 65 U/L (ref 0–45)
BASOPHILS # BLD AUTO: 0 10E3/UL (ref 0–0.2)
BASOPHILS NFR BLD AUTO: 1 %
BILIRUB SERPL-MCNC: 0.8 MG/DL
BUN SERPL-MCNC: 8.8 MG/DL (ref 6–20)
CALCIUM SERPL-MCNC: 9.2 MG/DL (ref 8.6–10)
CHLORIDE SERPL-SCNC: 103 MMOL/L (ref 98–107)
CREAT SERPL-MCNC: 0.83 MG/DL (ref 0.67–1.17)
CRP SERPL-MCNC: <3 MG/L
DEPRECATED HCO3 PLAS-SCNC: 23 MMOL/L (ref 22–29)
EGFRCR SERPLBLD CKD-EPI 2021: >90 ML/MIN/1.73M2
EOSINOPHIL # BLD AUTO: 0.2 10E3/UL (ref 0–0.7)
EOSINOPHIL NFR BLD AUTO: 4 %
ERYTHROCYTE [DISTWIDTH] IN BLOOD BY AUTOMATED COUNT: 12.9 % (ref 10–15)
GLUCOSE SERPL-MCNC: 179 MG/DL (ref 70–99)
HAV AB SER QL IA: REACTIVE
HBV CORE AB SERPL QL IA: NONREACTIVE
HBV SURFACE AB SERPL IA-ACNC: <3.5 M[IU]/ML
HBV SURFACE AB SERPL IA-ACNC: NONREACTIVE M[IU]/ML
HBV SURFACE AG SERPL QL IA: NONREACTIVE
HCT VFR BLD AUTO: 46.7 % (ref 40–53)
HCV AB SERPL QL IA: NONREACTIVE
HGB BLD-MCNC: 15.8 G/DL (ref 13.3–17.7)
IMM GRANULOCYTES # BLD: 0 10E3/UL
IMM GRANULOCYTES NFR BLD: 0 %
LYMPHOCYTES # BLD AUTO: 2.1 10E3/UL (ref 0.8–5.3)
LYMPHOCYTES NFR BLD AUTO: 37 %
MCH RBC QN AUTO: 31.3 PG (ref 26.5–33)
MCHC RBC AUTO-ENTMCNC: 33.8 G/DL (ref 31.5–36.5)
MCV RBC AUTO: 93 FL (ref 78–100)
MONOCYTES # BLD AUTO: 0.3 10E3/UL (ref 0–1.3)
MONOCYTES NFR BLD AUTO: 6 %
NEUTROPHILS # BLD AUTO: 3 10E3/UL (ref 1.6–8.3)
NEUTROPHILS NFR BLD AUTO: 53 %
NRBC # BLD AUTO: 0 10E3/UL
NRBC BLD AUTO-RTO: 0 /100
PLATELET # BLD AUTO: 216 10E3/UL (ref 150–450)
POTASSIUM SERPL-SCNC: 4.3 MMOL/L (ref 3.4–5.3)
PROT SERPL-MCNC: 7.4 G/DL (ref 6.4–8.3)
RBC # BLD AUTO: 5.05 10E6/UL (ref 4.4–5.9)
SODIUM SERPL-SCNC: 143 MMOL/L (ref 135–145)
VIT D+METAB SERPL-MCNC: 71 NG/ML (ref 20–50)
WBC # BLD AUTO: 5.6 10E3/UL (ref 4–11)

## 2024-07-12 PROCEDURE — 80053 COMPREHEN METABOLIC PANEL: CPT

## 2024-07-12 PROCEDURE — 36415 COLL VENOUS BLD VENIPUNCTURE: CPT

## 2024-07-12 PROCEDURE — 86706 HEP B SURFACE ANTIBODY: CPT

## 2024-07-12 PROCEDURE — 82542 COL CHROMOTOGRAPHY QUAL/QUAN: CPT | Mod: 90

## 2024-07-12 PROCEDURE — 86704 HEP B CORE ANTIBODY TOTAL: CPT

## 2024-07-12 PROCEDURE — 99000 SPECIMEN HANDLING OFFICE-LAB: CPT

## 2024-07-12 PROCEDURE — 86803 HEPATITIS C AB TEST: CPT

## 2024-07-12 PROCEDURE — 86140 C-REACTIVE PROTEIN: CPT

## 2024-07-12 PROCEDURE — 86708 HEPATITIS A ANTIBODY: CPT

## 2024-07-12 PROCEDURE — 82306 VITAMIN D 25 HYDROXY: CPT

## 2024-07-12 PROCEDURE — 86481 TB AG RESPONSE T-CELL SUSP: CPT

## 2024-07-12 PROCEDURE — 87340 HEPATITIS B SURFACE AG IA: CPT

## 2024-07-12 PROCEDURE — 80145 DRUG ASSAY ADALIMUMAB: CPT | Mod: 90

## 2024-07-12 PROCEDURE — 85025 COMPLETE CBC W/AUTO DIFF WBC: CPT

## 2024-07-14 LAB
GAMMA INTERFERON BACKGROUND BLD IA-ACNC: 0.03 IU/ML
M TB IFN-G BLD-IMP: NEGATIVE
M TB IFN-G CD4+ BCKGRND COR BLD-ACNC: 9.97 IU/ML
MITOGEN IGNF BCKGRD COR BLD-ACNC: -0.01 IU/ML
MITOGEN IGNF BCKGRD COR BLD-ACNC: 0 IU/ML
QUANTIFERON MITOGEN: 10 IU/ML
QUANTIFERON NIL TUBE: 0.03 IU/ML
QUANTIFERON TB1 TUBE: 0.03 IU/ML
QUANTIFERON TB2 TUBE: 0.02

## 2024-07-15 DIAGNOSIS — K50.80 CROHN'S DISEASE OF BOTH SMALL AND LARGE INTESTINE WITHOUT COMPLICATION (H): Primary | ICD-10-CM

## 2024-07-16 LAB
ADALIMUMAB AB SERPL IA-MCNC: <1.7 U/ML
ADALIMUMAB SERPL-MCNC: 12.8 UG/ML

## 2024-07-20 ENCOUNTER — HEALTH MAINTENANCE LETTER (OUTPATIENT)
Age: 49
End: 2024-07-20

## 2024-09-16 ENCOUNTER — TELEPHONE (OUTPATIENT)
Dept: GASTROENTEROLOGY | Facility: CLINIC | Age: 49
End: 2024-09-16
Payer: COMMERCIAL

## 2024-09-16 NOTE — TELEPHONE ENCOUNTER
[facility] Call Center    Phone Message    May a detailed message be left on voicemail: yes     Reason for Call: Other: adalimumab (HUMIRA *CF* PEN) 40 MG/0.4ML pen kit - Requesting quantity authorization.   Reference Number: [id number].  Need approval within 48 hours.     Action Taken: Message routed to:  [facility] GI    Travel Screening: Not Applicable     Date of Service:

## 2024-09-16 NOTE — TELEPHONE ENCOUNTER
Call back to [facility], submitted a quantity case over ride via phone which was approved until this time next year. Patient is ok to receive 4, 40mg pens per month. See reference/case number in previous message.    Melani Avery RN

## 2024-12-09 NOTE — PROGRESS NOTES
Medication Therapy Management (MTM) Encounter    ASSESSMENT:                            Medication Adherence/Access: No issues identified.    Crohn's Disease:  Monty would benefit from continued treatment with Humira 40 mg weekly. Monty is due for routine maintenance labs. Monty is up to date on annual tuberculosis screening.      No access issues for advanced therapy are present.  Refill sent to [facility] specialty pharmacy today.  Reminders for routine cancer screening were provided. Monty is indicated for routine vaccinations including influenza and COVID. He is no longer insterested in getting COVID vaccines, but will consider influenza vaccine. He is also indicated for vaccinations due to immunosuppression including Prevnar-20 and Shingrix, which he will consider.     Monty is not getting adequate calcium in his diet and supplementation was recommended. Given limited prior steroid use and no other osteoporosis risk factors, DEXA not currently indicated. Recommend continued monitoring for need.     Migraines:  Appropriate to continue Botox injections to give an adequate trial for effectiveness.  Appropriate for Monty to continue following with neurology.    Allergic rhinitis:  Controlled, no medication changes needed at this time.    PLAN:                            Continue Humira 40 mg every week. Refills sent to [facility] specialty pharmacy.    Reminder to get routine labs done. You can walk-in at any [facility] lab or schedule a lab only appointment.     It is recommended to get 1,000 - 1,200 mg of Calcium per day from all sources, if you are unable to achieve this from diet alone then supplementation is appropriate.  Recommend taking 500 or 600 mg tablet up to twice daily to achieve daily Calcium goal.  You may review this reference of Calcium content in food for guidance:  https://www.dietaryguidelines.gov/food-sources-calcium or the International Osteopor[employer] Foundation has a great chart as well!  Recommend  getting 1,000 units per day of Vitamin D     Monty will consider the following vaccines:   Annual flu shot  Hepatitis B vaccine (3-dose series) - Order sent to [facility] in [place]  Pneumococcal pneumonia (Prevnar-20) - Order on file at [facility] in [place]  Shingles (Shingrix 2-dose series) - Order on file at [facility] in [place]      Follow-up: 6 months or sooner if needed. Will send [email or web address] scheduling link.      SUBJECTIVE/OBJECTIVE:                          Monty Fleming is a 48 year old male seen for a follow-up visit.       Reason for visit: Humira 6-month follow-up.    Allergies/ADRs: Reviewed in chart  Past Medical History: Reviewed in chart  Tobacco: He reports that he has never smoked. He has never used smokeless tobacco.  Alcohol: not currently using      Medication Adherence/Access: no issues reported.    Crohn's Disease:   Humira 40 mg weekly  Vitamin C daily  Vitamin D 10,000 units daily  Vit B12 1,000 mcg daily  Linzess 72 mcg daily  Dicyclomine 10 mg 4 times daily as needed  Hyoscyamine 375 mcg every 12 hours as needed  MiraLAX 1 capful twice daily as needed    States Cali is going well.  No issues obtaining medication, denies side effects.  States he has an average of 4-5 bowel movements per day.  States they can vary between soft and formed, sometimes experiences constipation.  He is on Linzess each morning and uses MiraLAX as needed.  States this is effective and he does not need a change in regimen at this time.  States he does have abdominal pain on a regular basis which fluctuates in intensity but mostly mild to moderate.  Has hyoscyamine and dicyclomine, states he feels dicyclomine is more effective and will use this when he really needs it.  Dicyclomine does cause dry mouth, but he states it is effective and he denies interest in change in regimen.    Abdominal X-ray 7/9/2024 showed moderate amount of stool burden - miralax twice daily as needed and Linzess were initiated.    Last  provider visit: 24 Ruby Beck DO   Next provider visit: 25 Ruby Beck DO   Last labs completed: 2024  Lab frequency: every 3 months   - standing labs available until 2025  Next labs due: NOW  Last TB screenin2024  PDC: 94    Therapy Start Date: 2019    Lab Results   Component Value Date    CALPRF 22.0 2023    CRPI <3.00 2024     Adalimumab level:  Lab Results   Component Value Date    ADACON 12.8 2024    ADAABY <1.7 2024       IBD History:  Time of diagnosis:   Extent of disease: ileocolonic  Phenotype: stricturing  Previous therapies: humira at [id number] - now receiving weekly  EIM: joint pains  Previous surgeries: none for crohns, did have nissen in the past  Most recent endoscopy:   Impression:                 - Normal esophagus.   - Z-line regular, 43 cm from the incisors.   - A Nissen fundoplication was found. The wrap appears intact.   - A patch of friable gastric mucosa in the distal gastric body. Biopsied.   - Normal examined duodenum. Biopsied. Overall unremarkable exam. Mild friability in the gastric body was biopsied. Can consider trial of omeprazole 40 mg daily for 2 months to see if this offers any benefit to chronic epigastric/LUQ pain.       Migraines:  Botox  Eletriptan 40 mg as needed    Was previously on amitriptyline and Ajovy.  Monty states Ajovy was not effective.  He reports side effects with amitriptyline including weight gain and numbness in hands.  For these reasons he has started Botox injections, he has only had 1 injection so far.  Follows with neurology.    Allergic rhinitis:  Fexofenadine 180 mg daily  Fluticasone nasal spray -1 spray in each nostril daily    Monty reports these are effective, no side effects or concerns to report.    IBD Health Maintenance    Vaccinations:  All patients on biologics should avoid live vaccines unless specifically indicated.    -- Influenza (every year) last , will consider  -- TdaP (every  10 years) last 2020  -- Pneumococcal Pneumonia               Prevnar-13: 1/2019              Pneumovax-23: not on file               Prevnar-20: not on file, order on file at [facility] in [place]   -- COVID-19 last 2021, not interested in any more      One time confirmation of immunity or serologies:  -- Hepatitis A (serologies or immunizations) vaccine x2 1999, 2019  -- Hepatitis B (serologies or immunizations) vaccine x3 2019                         - serologies 2024 show insufficient immunity, will consider, send order to [facility] in [place]  -- Varicella/Zoster               Varicella confirms chickenpox infection as a child               Zoster not on file, order on file at [facility] pharmacy in [place]  -- MMR not on file   -- Meningococcal meningitis (all patients at risk for meningitis)-- not on file, declines risk factors     Due to the immunosuppression in this patient, I would not advise administration of live vaccines such as varicella/VZV, intranasal influenza, MMR, or yellow fever vaccine (if traveling).      Immunosuppressive Screening:    Lab Results   Component Value Date    AUSAB <3.50 07/12/2024    HEPBANG Nonreactive 07/12/2024    HBCAB Nonreactive 07/12/2024    HCVAB Nonreactive 07/12/2024    TBRES Negative 07/12/2024       Bone mineral density screening   -- Recommend all patients supplement with calcium and vitamin D              - unsure if getting adequate calcium from diet, vegetarian, does little dairy and eggs, uses almond milk interested in more information  -- Given minimal prior steroid use recommend continued monitoring for DEXA need  - last prednisone 5 years or more ago    Cancer Screening:  Colon cancer screening:  Per Dr. Beck     Skin cancer screening: Annual visual exam of skin by dermatologist since patient is immunocompromised - Last 5/2024, scheduled for 5/2025.    Depression Screening:    PHQ-2 Score:         6/11/2024    11:53 AM 5/4/2023     9:03 AM   PHQ-2 ( 1999  [employer])   Q1: Little interest or pleasure in doing things 0 0   Q2: Feeling down, depressed or hopeless 0 0   PHQ-2 Score 0 0       Research:  Are you interested in being contacted about enrollment in clinical research studies? Yes    Would you like to receive a quarterly newsletter on research via email. YES [email or web address]         Misc:  -- Avoid tobacco use  -- Avoid NSAIDs as there is potentially a 25% chance of causing an IBD flare         Today's Vitals: There were no vitals taken for this visit.  ----------------      I spent 11 minutes with this patient today. All changes were made via collaborative practice agreement with Ruby Beck. A copy of the visit note is available to the patient provider(s) within the patient medical record.     A summary of these recommendations was sent via [facility].    Daisy Singh, PharmD  MTM Pharmacist  [facility]     Telemedicine Visit Details  The patient's medications can be safely assessed via a telemedicine encounter.  Type of service:  Video Conference via [employer]  Originating Location (pt. Location): Other work    Distant Location (provider location):  Off-site  Start Time:  9:59 AM  End Time:  10:10 AM     Medication Therapy Recommendations  No medication therapy recommendations to display

## 2024-12-10 ENCOUNTER — TELEPHONE (OUTPATIENT)
Dept: GASTROENTEROLOGY | Facility: CLINIC | Age: 49
End: 2024-12-10
Payer: COMMERCIAL

## 2024-12-10 ENCOUNTER — VIRTUAL VISIT (OUTPATIENT)
Dept: PHARMACY | Facility: CLINIC | Age: 49
End: 2024-12-10
Attending: INTERNAL MEDICINE
Payer: COMMERCIAL

## 2024-12-10 VITALS — BODY MASS INDEX: 29.03 KG/M2 | HEIGHT: 73 IN | WEIGHT: 219 LBS

## 2024-12-10 DIAGNOSIS — K50.80 CROHN'S DISEASE OF BOTH SMALL AND LARGE INTESTINE WITHOUT COMPLICATION (H): Primary | ICD-10-CM

## 2024-12-10 RX ORDER — HEPATITIS B VACCINE (RECOMBINANT) 10 UG/ML
0.5 INJECTION, SUSPENSION INTRAMUSCULAR; SUBCUTANEOUS ONCE
Qty: 1 ML | Refills: 2 | Status: SHIPPED | OUTPATIENT
Start: 2024-12-10 | End: 2024-12-10

## 2024-12-10 RX ORDER — POLYETHYLENE GLYCOL 3350 17 G/17G
1 POWDER, FOR SOLUTION ORAL 2 TIMES DAILY PRN
COMMUNITY

## 2024-12-10 RX ORDER — ADALIMUMAB 40MG/0.4ML
40 KIT SUBCUTANEOUS
Qty: 4 EACH | Refills: 5 | Status: SHIPPED | OUTPATIENT
Start: 2024-12-10

## 2024-12-10 ASSESSMENT — PAIN SCALES - GENERAL: PAINLEVEL_OUTOF10: NO PAIN (0)

## 2024-12-10 NOTE — TELEPHONE ENCOUNTER
PA Initiation    Medication: HUMIRA *CF* 40 MG/0.4ML SC PSKT  Insurance Company: [employer] - Phone [phone] Fax [phone]  Pharmacy Filling the Rx: [facility] - [address] [facility]  Filling Pharmacy Phone:    Filling Pharmacy Fax:    Start Date: 12/10/2024     [id number]

## 2024-12-10 NOTE — NURSING NOTE
Current patient location: [place]    Is the patient currently in the state of [place]? YES    Visit mode:VIDEO    If the visit is dropped, the patient can be reconnected by:VIDEO VISIT: Send to e-mail at: [email or web address]    Will anyone else be joining the visit? NO  (If patient encounters technical issues they should call [phone]    Are changes needed to the allergy or medication list? Pt stated no changes to allergies and Pt stated no med changes    Are refills needed on medications prescribed by this physician? NO    Rooming Documentation:  Questionnaire(s) not done per department protocol    Reason for visit: BECKIE BAJWAF

## 2024-12-10 NOTE — PATIENT INSTRUCTIONS
"Recommendations from today's MT visit:                                                           Continue Humira 40 mg every week. Refills sent to [facility] specialty pharmacy.    Reminder to get routine labs done. You can walk-in at any [place] lab or schedule a lab only appointment.     It is recommended to get 1,000 - 1,200 mg of Calcium per day from all sources, if you are unable to achieve this from diet alone then supplementation is appropriate.  Recommend taking 500 or 600 mg tablet up to twice daily to achieve daily Calcium goal.  You may review this reference of Calcium content in food for guidance:  https://www.dietaryguidelines.gov/food-sources-calcium or the International Osteoporosis Foundation has a great chart as well!  Recommend getting 1,000 units per day of Vitamin D     Monty will consider the following vaccines:   Annual flu shot  Hepatitis B vaccine (3-dose series) - Order sent to [facility] in [place]  Pneumococcal pneumonia (Prevnar-20) - Order on file at [facility] in [place]  Shingles (Shingrix 2-dose series) - Order on file at [facility] in [place]      Follow-up: 6 months or sooner if needed. Will send [employer] scheduling link.      It was great speaking with you today.  I value your experience and would be very thankful for your time in providing feedback in our clinic survey. In the next few days, you may receive an email or text message from [employer] with a link to a survey related to your  clinical pharmacist.\"     To schedule another MT appointment, please call the clinic directly or you may call the [place] [facility] scheduling line at [phone].    My Clinical Pharmacist's contact information:                                                      Please feel free to contact me with any questions or concerns you have.      Daisy Singh, PharmD  MTM Pharmacist  [facility]    "

## 2024-12-12 NOTE — TELEPHONE ENCOUNTER
Prior Authorization Approval    Medication: HUMIRA *CF* 40 MG/0.4ML SC PSKT  Authorization Effective Date: 11/10/2024  Authorization Expiration Date: 12/10/2025  Approved Dose/Quantity: 4/28  Reference #: [id number]   Insurance Company: [employer] - Phone [phone] Fax [phone]  Expected CoPay: $ 0  CoPay Card Available:      Financial Assistance Needed: no  Which Pharmacy is filling the prescription: [facility] - [address] [place] [address]  Pharmacy Notified: renewal  Patient Notified: renewal

## 2025-04-29 ENCOUNTER — LAB (OUTPATIENT)
Dept: LAB | Facility: CLINIC | Age: 50
End: 2025-04-29
Payer: COMMERCIAL

## 2025-04-29 DIAGNOSIS — K50.80 CROHN'S DISEASE OF BOTH SMALL AND LARGE INTESTINE WITHOUT COMPLICATION (H): ICD-10-CM

## 2025-04-29 LAB
ALBUMIN SERPL BCG-MCNC: 4.7 G/DL (ref 3.5–5.2)
ALP SERPL-CCNC: 79 U/L (ref 40–150)
ALT SERPL W P-5'-P-CCNC: 94 U/L (ref 0–70)
ANION GAP SERPL CALCULATED.3IONS-SCNC: 11 MMOL/L (ref 7–15)
AST SERPL W P-5'-P-CCNC: 53 U/L (ref 0–45)
BASOPHILS # BLD AUTO: 0 10E3/UL (ref 0–0.2)
BASOPHILS NFR BLD AUTO: 1 %
BILIRUB SERPL-MCNC: 0.8 MG/DL
BUN SERPL-MCNC: 10.2 MG/DL (ref 6–20)
CALCIUM SERPL-MCNC: 9.3 MG/DL (ref 8.8–10.4)
CHLORIDE SERPL-SCNC: 103 MMOL/L (ref 98–107)
CREAT SERPL-MCNC: 0.78 MG/DL (ref 0.67–1.17)
CRP SERPL-MCNC: <3 MG/L
EGFRCR SERPLBLD CKD-EPI 2021: >90 ML/MIN/1.73M2
EOSINOPHIL # BLD AUTO: 0.1 10E3/UL (ref 0–0.7)
EOSINOPHIL NFR BLD AUTO: 2 %
ERYTHROCYTE [DISTWIDTH] IN BLOOD BY AUTOMATED COUNT: 12.5 % (ref 10–15)
GLUCOSE SERPL-MCNC: 83 MG/DL (ref 70–99)
HCO3 SERPL-SCNC: 27 MMOL/L (ref 22–29)
HCT VFR BLD AUTO: 47.5 % (ref 40–53)
HGB BLD-MCNC: 16.1 G/DL (ref 13.3–17.7)
IMM GRANULOCYTES # BLD: 0 10E3/UL
IMM GRANULOCYTES NFR BLD: 0 %
LYMPHOCYTES # BLD AUTO: 2.1 10E3/UL (ref 0.8–5.3)
LYMPHOCYTES NFR BLD AUTO: 29 %
MCH RBC QN AUTO: 30.8 PG (ref 26.5–33)
MCHC RBC AUTO-ENTMCNC: 33.9 G/DL (ref 31.5–36.5)
MCV RBC AUTO: 91 FL (ref 78–100)
MONOCYTES # BLD AUTO: 0.5 10E3/UL (ref 0–1.3)
MONOCYTES NFR BLD AUTO: 7 %
NEUTROPHILS # BLD AUTO: 4.4 10E3/UL (ref 1.6–8.3)
NEUTROPHILS NFR BLD AUTO: 61 %
PLATELET # BLD AUTO: 249 10E3/UL (ref 150–450)
POTASSIUM SERPL-SCNC: 4.2 MMOL/L (ref 3.4–5.3)
PROT SERPL-MCNC: 7.7 G/DL (ref 6.4–8.3)
RBC # BLD AUTO: 5.23 10E6/UL (ref 4.4–5.9)
SODIUM SERPL-SCNC: 141 MMOL/L (ref 135–145)
WBC # BLD AUTO: 7.2 10E3/UL (ref 4–11)

## 2025-04-29 PROCEDURE — 36415 COLL VENOUS BLD VENIPUNCTURE: CPT

## 2025-04-29 PROCEDURE — 86481 TB AG RESPONSE T-CELL SUSP: CPT

## 2025-04-29 PROCEDURE — 86140 C-REACTIVE PROTEIN: CPT

## 2025-04-29 PROCEDURE — 80053 COMPREHEN METABOLIC PANEL: CPT

## 2025-04-29 PROCEDURE — 85025 COMPLETE CBC W/AUTO DIFF WBC: CPT

## 2025-05-01 LAB
GAMMA INTERFERON BACKGROUND BLD IA-ACNC: 0.05 IU/ML
M TB IFN-G BLD-IMP: NEGATIVE
M TB IFN-G CD4+ BCKGRND COR BLD-ACNC: 9.95 IU/ML
MITOGEN IGNF BCKGRD COR BLD-ACNC: 0 IU/ML
MITOGEN IGNF BCKGRD COR BLD-ACNC: 0.01 IU/ML
QUANTIFERON MITOGEN: 10 IU/ML
QUANTIFERON NIL TUBE: 0.05 IU/ML
QUANTIFERON TB1 TUBE: 0.05 IU/ML
QUANTIFERON TB2 TUBE: 0.06

## 2025-05-20 ENCOUNTER — VIRTUAL VISIT (OUTPATIENT)
Dept: PHARMACY | Facility: CLINIC | Age: 50
End: 2025-05-20
Attending: INTERNAL MEDICINE
Payer: COMMERCIAL

## 2025-05-20 VITALS — WEIGHT: 215 LBS | BODY MASS INDEX: 28.49 KG/M2 | HEIGHT: 73 IN

## 2025-05-20 DIAGNOSIS — K50.80 CROHN'S DISEASE OF BOTH SMALL AND LARGE INTESTINE WITHOUT COMPLICATION (H): Primary | ICD-10-CM

## 2025-05-20 RX ORDER — ADALIMUMAB 40MG/0.4ML
40 KIT SUBCUTANEOUS
Qty: 1.6 ML | Refills: 5 | Status: SHIPPED | OUTPATIENT
Start: 2025-05-20

## 2025-05-20 ASSESSMENT — PAIN SCALES - GENERAL: PAINLEVEL_OUTOF10: NO PAIN (0)

## 2025-05-20 NOTE — NURSING NOTE
Patient confirms medications and allergies are accurate via patients echeck in completion, and or denies any changes since last reviewed/verified.     Current patient location: @ [facility]    Is the patient currently in the state of [place]? YES    Visit mode: VIDEO    If the visit is dropped, the patient can be reconnected by:VIDEO VISIT: Text to cell phone:   Telephone Information:   Mobile [phone]       Will anyone else be joining the visit? NO  (If patient encounters technical issues they should call [phone]    Are changes needed to the allergy or medication list? No    Are refills needed on medications prescribed by this physician? NO    Rooming Documentation:  Questionnaire(s) not done per department protocol    Reason for visit: RECHECK    Emperatriz BAJWAF

## 2025-05-20 NOTE — PROGRESS NOTES
Medication Therapy Management (MTM) Encounter    ASSESSMENT:                            Medication Adherence/Access: No issues identified.    Crohn's disease:  Monty would benefit from continuing treatment with Humira 40 mg once weekly. Monty is up to date on routine maintenance labs and is up to date on annual tuberculosis screening.     Monty is indicated for routine vaccinations including Hepatitis A, Prevnar-20, and Shingrix, which he will consider. No access issues for advanced therapy are present. Reminders for routine cancer screening were provided. He has not started calcium/vitamin D supplementation, would like information in AVS again.     Most recent comprehensive IBD health maintenance review was completed on 12/10/24.     Migraines:  Appropriate to continue current treatment regimen.  Appropriate for Monty to continue following with neurology.     Allergic rhinitis:  Controlled, no medication changes needed at this time.    PLAN:                               Continue Humira 40 mg every week. Refills sent to [facility] specialty pharmacy.    It is recommended to get 1,000 - 1,200 mg of Calcium per day from all sources, if you are unable to achieve this from diet alone then supplementation is appropriate.  Recommend taking 500 or 600 mg tablet up to twice daily to achieve daily Calcium goal.  You may review this reference of Calcium content in food for guidance:  https://www.dietaryguidelines.gov/food-sources-calcium or the International Osteoporosis Foundation has a great chart as well!  Recommend getting 1,000 units per day of Vitamin D      Monty will consider the following vaccines:   Hepatitis B vaccine (3-dose series) - Order sent to [facility] in [place]  Pneumococcal pneumonia (Prevnar-20) - Order on file at [facility] in [place]  Shingles (Shingrix 2-dose series) - Order on file at [facility] in [place]    Follow-up: 6 months or sooner if needed. Will send [employer] scheduling link.       SUBJECTIVE/OBJECTIVE:                          Monty Fleming is a 49 year old male seen for a follow-up visit.       Reason for visit: Humira check in.    Allergies/ADRs: Reviewed in chart  Past Medical History: Reviewed in chart  Tobacco: He reports that he has never smoked. He has never used smokeless tobacco.  Alcohol: not currently using    Medication Adherence/Access: no issues reported.    Crohn's Disease:   Humira 40 mg weekly  Vitamin C daily  Vitamin D 10,000 units daily  Vit B12 1,000 mcg daily  Linzess 72 mcg daily  Dicyclomine 10 mg 4 times daily as needed  Hyoscyamine 375 mcg every 12 hours as needed  MiraLAX 1 capful twice daily as needed    Monty states things are going well. There have been no changes and he does not have any concerns. He has not had any trouble getting medications from pharmacy. Has not yet started calcium/vitamin D supplementation, would like info in AVS. Notes he has not had the time to get vaccines but will continue to consider.    Last provider visit: 24 Ruby Beck DO   Next provider visit: 25 Ruby Bcek DO   Last labs completed: 25  Lab frequency: every 3 months   - standing labs available until 25  Next labs due: 2025  Last TB screenin25    Therapy Start Date:     IBD History:  Time of diagnosis:   Extent of disease: ileocolonic  Phenotype: stricturing  Previous therapies: humira at [id number] - now receiving weekly  EIM: joint pains  Previous surgeries: none for crohns, did have nissen in the past  Most recent endoscopy:   Impression:                 - Normal esophagus.   - Z-line regular, 43 cm from the incisors.   - A Nissen fundoplication was found. The wrap appears intact.   - A patch of friable gastric mucosa in the distal gastric body. Biopsied.   - Normal examined duodenum. Biopsied. Overall unremarkable exam. Mild friability in the gastric body was biopsied. Can consider trial of omeprazole 40 mg daily for 2 months  to see if this offers any benefit to chronic epigastric/LUQ pain.         Migraines:  Botox  Eletriptan 40 mg as needed     Was previously on amitriptyline and Ajovy.  Monty states Ajovy was not effective.  He reports side effects with amitriptyline including weight gain and numbness in hands.  For these reasons he has started Botox injections, he has has 3 injections so far. He notes he has not seen effect yet though he understands it may take more time for this to be effective.   He has recently been prescribed Ubrelvy to take when needed but had not yet obtained the medication from pharmacy. Follows with neurology.       Allergic rhinitis:  Fexofenadine 180 mg daily  Fluticasone nasal spray -1 spray in each nostril daily  Monty reports these are effective, no side effects or concerns to report.        Today's Vitals: There were no vitals taken for this visit.  ----------------      I spent 5 minutes with this patient today. All changes were made via collaborative practice agreement with Ruby Beck.     A summary of these recommendations was sent via [employer].    Daisy Singh, PharmD  MTM Pharmacist  [facility]     Telemedicine Visit Details  The patient's medications can be safely assessed via a telemedicine encounter.  Type of service:  Video Conference via [employer]  Originating Location (pt. Location): Home    Distant Location (provider location):  Off-site  Start Time: 0930  End Time: 0935     Medication Therapy Recommendations  No medication therapy recommendations to display

## 2025-05-22 RX ORDER — ZOSTER VACCINE RECOMBINANT, ADJUVANTED 50 MCG/0.5
1 KIT INTRAMUSCULAR ONCE
Qty: 1 EACH | Refills: 1 | Status: SHIPPED | OUTPATIENT
Start: 2025-05-22 | End: 2025-05-22

## 2025-05-22 RX ORDER — PNEUMOCOCCAL 20-VALENT CONJUGATE VACCINE 2.2; 2.2; 2.2; 2.2; 2.2; 2.2; 2.2; 2.2; 2.2; 2.2; 2.2; 2.2; 2.2; 2.2; 2.2; 2.2; 4.4; 2.2; 2.2; 2.2 UG/.5ML; UG/.5ML; UG/.5ML; UG/.5ML; UG/.5ML; UG/.5ML; UG/.5ML; UG/.5ML; UG/.5ML; UG/.5ML; UG/.5ML; UG/.5ML; UG/.5ML; UG/.5ML; UG/.5ML; UG/.5ML; UG/.5ML; UG/.5ML; UG/.5ML; UG/.5ML
0.5 INJECTION, SUSPENSION INTRAMUSCULAR ONCE
Qty: 0.5 ML | Refills: 0 | Status: SHIPPED | OUTPATIENT
Start: 2025-05-22 | End: 2025-05-22

## 2025-05-22 NOTE — PATIENT INSTRUCTIONS
"Recommendations from today's MTM visit:                                                       Continue Humira 40 mg every week. Refills sent to [facility] specialty pharmacy.    It is recommended to get 1,000 - 1,200 mg of Calcium per day from all sources, if you are unable to achieve this from diet alone then supplementation is appropriate.  Recommend taking 500 or 600 mg tablet up to twice daily to achieve daily Calcium goal.  You may review this reference of Calcium content in food for guidance:  https://www.dietaryguidelines.gov/food-sources-calcium or the International Osteoporosis Foundation has a great chart as well!  Recommend getting 1,000 units per day of Vitamin D      Monty will consider the following vaccines:   Hepatitis B vaccine (3-dose series) - Order sent to [facility] in [place]  Pneumococcal pneumonia (Prevnar-20) - Order on file at [facility] in [place]  Shingles (Shingrix 2-dose series) - Order on file at [facility] in [place]    Follow-up: 6 months or sooner if needed. Will send [employer] scheduling link.      It was great speaking with you today.  I value your experience and would be very thankful for your time in providing feedback in our clinic survey. In the next few days, you may receive an email or text message from [employer] with a link to a survey related to your  clinical pharmacist.\"     To schedule another MTM appointment, please call the clinic directly or you may call the MTM scheduling line at [phone].    My Clinical Pharmacist's contact information:                                                      Please feel free to contact me with any questions or concerns you have.      Daisy Singh, PharmD  MTM Pharmacist  [facility]    "

## 2025-05-28 ENCOUNTER — OFFICE VISIT (OUTPATIENT)
Dept: GASTROENTEROLOGY | Facility: CLINIC | Age: 50
End: 2025-05-28
Attending: INTERNAL MEDICINE
Payer: COMMERCIAL

## 2025-05-28 ENCOUNTER — OFFICE VISIT (OUTPATIENT)
Dept: DERMATOLOGY | Facility: CLINIC | Age: 50
End: 2025-05-28
Payer: COMMERCIAL

## 2025-05-28 VITALS
OXYGEN SATURATION: 98 % | HEIGHT: 73 IN | WEIGHT: 224.9 LBS | BODY MASS INDEX: 29.81 KG/M2 | HEART RATE: 69 BPM | DIASTOLIC BLOOD PRESSURE: 88 MMHG | SYSTOLIC BLOOD PRESSURE: 133 MMHG

## 2025-05-28 DIAGNOSIS — D22.9 MULTIPLE BENIGN NEVI: ICD-10-CM

## 2025-05-28 DIAGNOSIS — L81.4 SOLAR LENTIGO: ICD-10-CM

## 2025-05-28 DIAGNOSIS — D18.01 CHERRY ANGIOMA: ICD-10-CM

## 2025-05-28 DIAGNOSIS — L82.1 SEBORRHEIC KERATOSES: Primary | ICD-10-CM

## 2025-05-28 DIAGNOSIS — Z86.018 HISTORY OF DYSPLASTIC NEVUS: ICD-10-CM

## 2025-05-28 DIAGNOSIS — D84.9 IMMUNOSUPPRESSED STATUS: ICD-10-CM

## 2025-05-28 DIAGNOSIS — K50.80 CROHN'S DISEASE OF BOTH SMALL AND LARGE INTESTINE WITHOUT COMPLICATION (H): Primary | ICD-10-CM

## 2025-05-28 DIAGNOSIS — Z12.83 SCREENING EXAM FOR SKIN CANCER: ICD-10-CM

## 2025-05-28 PROCEDURE — 99214 OFFICE O/P EST MOD 30 MIN: CPT | Performed by: INTERNAL MEDICINE

## 2025-05-28 ASSESSMENT — PAIN SCALES - GENERAL
PAINLEVEL_OUTOF10: MODERATE PAIN (5)
PAINLEVEL_OUTOF10: MODERATE PAIN (4)

## 2025-05-28 NOTE — PROGRESS NOTES
[facility] Note  Encounter Date: May 28, 2025  Office Visit      Dermatology Problem List:  FBSC: 5/28/25    # History of DN x2 - patient reported, records requested  - left calf, ~2017  - second DN ~ 2010    PMHx: Chron's - Humira since 2018   Social history: Lived in [place] and [place]. Moved to [place] for job.  ____________________________________________     Assessment & Plan:     # On Humira for Chron's Disease. Discussed increased risk of skin cancers with immunosuppressing medications.   - Recommend sunscreens SPF #30 or greater, protective clothing and avoidance of tanning beds.   - Recommend yearly skin exams.     # Hx DNs  - edu on increased melanoma risk  - continue annual skin exams    # Benign findings: multiple benign nevi, lentigines, cherry angiomas, SKs  - edu on benign etiology  - Signs and Symptoms of non-melanoma skin cancer and ABCDEs of melanoma reviewed with patient. Patient encouraged to perform monthly self skin exams and educated on how to perform them. UV precautions reviewed with patient. Patient was asked about new or changing moles/lesions on body.   - Sunscreen: Apply 20 minutes prior to going outdoors and reapply every two hours, when wet or sweating. We recommend using an SPF 30 or higher, and to use one that is water resistant.     - RTC for changes    Procedures Performed:   None    Follow-up: 1 year(s) in-person, or earlier for new or changing lesions    Staff and scribe:    Scribe Disclosure:   I, FAZAL FORRESTER, am serving as a scribe; to document services personally performed by Lorena Arias PA-C -based on data collection and the provider's statements to me.     Provider Disclosure:  I agree with above History, Review of Systems, Physical exam and Plan.  I have reviewed the content of the documentation and have edited it as needed. I have personally performed the services documented here and the documentation accurately represents those  services and the decisions I have made.      Electronically signed by:    All risks, benefits and alternatives were discussed with patient.  Patient is in agreement and understands the assessment and plan.  All questions were answered.    Lorena Arias PA-C, MPAS  [facility]: Phone: [phone], Fax: [phone]  [facility]: Phone: [phone],  Fax: [phone]  [facility]: Phone: [phone], Fax: [phone]  ____________________________________________    CC: No chief complaint on file.      Reviewed patients past medical history and pertinent chart review prior to patient's visit today.     HPI:  Mr. Monty Fleming is a 49 year old male who presents today as a return patient for [facility].     Today patient reported a spot of concern on his L inner ear.     Has a hx of DN    Patient is otherwise feeling well, without additional concerns.    Labs:  N/A    Physical Exam:  Vitals: There were no vitals taken for this visit.  SKIN: Total skin excluding the undergarment areas was performed. The exam included the head/face, neck, both arms, chest, back, abdomen, both legs, digits and/or nails.    - - Quezada's skin type II, has <100 nevi  - There are dome shaped bright red papules on the trunk.   - Multiple regular brown pigmented macules and papules are identified on the trunk and extremities.   - Scattered brown macules on sun exposed areas.  - There are waxy stuck on tan to brown papules on the trunk.    - No other lesions of concern on areas examined.     Medications:  Current Outpatient Medications   Medication Sig Dispense Refill    acetaminophen (TYLENOL) 500 MG tablet Take 500 mg by mouth      Adalimumab (HUMIRA, 2 PEN,) 40 MG/0.4ML pen kit Inject 0.4 mLs (40 mg) subcutaneously every 7 days. 1.6 mL 5    Ascorbic Acid (VITAMIN C) 500 MG CHEW Take 500 mg by mouth every morning      cholecalciferol  (VITAMIN D3) 125 mcg (5000 units) capsule Take 2 capsules by mouth daily      cyanocobalamin (VITAMIN B-12) 1000 MCG tablet Take 1,000 mcg by mouth daily      diclofenac (VOLTAREN) 1 % topical gel Apply 2 g topically 4 times daily 100 g 11    dicyclomine (BENTYL) 10 MG capsule Take 1 capsule (10 mg) by mouth 4 times daily as needed (abdominal pain) 270 capsule 3    eletriptan (RELPAX) 40 MG tablet Take 40 mg by mouth at onset of headache for migraine Ave per month 2 tabs      fexofenadine (ALLEGRA) 180 MG tablet Take 180 mg by mouth      fluticasone (FLONASE) 50 MCG/ACT nasal spray 1 spray      hyoscyamine ER (LEVBID) 375 mcg 12 hr tablet Take 0.375 mg by mouth every 12 hours as needed       linaclotide (LINZESS) 72 MCG capsule Take 2 capsules (144 mcg) by mouth every morning (before breakfast). 180 capsule 2    linaclotide (LINZESS) 72 MCG capsule Take 1 capsule (72 mcg) by mouth every morning (before breakfast) 90 capsule 11    OnabotulinumtoxinA (BOTOX IJ) Inject as directed. For headaches      ondansetron (ZOFRAN-ODT) 4 MG ODT tab Take 4 mg by mouth every 6 hours as needed       polyethylene glycol (MIRALAX) 17 GM/Dose powder Take 1 Capful by mouth 2 times daily as needed for constipation.      tadalafil (CIALIS) 20 MG tablet Take 20 mg by mouth       No current facility-administered medications for this visit.      Past Medical/Surgical History:   Patient Active Problem List   Diagnosis    Abnormal MRI of head    Allergic rhinitis    Bursitis, infrapatellar or subpatellar    Chronic bursitis of left shoulder    Cough, persistent    Crohn's disease of colon with complication (H)    ED (erectile dysfunction)    Dysphagia, unspecified    GERD (gastroesophageal reflux disease)    Fatigue    Hearing loss on left    History of pneumonia    History of irritable bowel syndrome    Hyperglycemia    Hypogonadism male    Insufficient sleep syndrome    Migraine headache    CAROLE on CPAP    Peripheral vestibulopathy     Swallowing dysfunction    Tendonitis of ankle, left    Ventricular arrhythmia    Vitamin D deficiency     Past Medical History:   Diagnosis Date    Crohn's disease (H) 2018    Dysplastic nevus     ED (erectile dysfunction)     Fatty liver     Gastroesophageal reflux disease with esophagitis     Migraine     Plantar fasciitis     PONV (postoperative nausea and vomiting)     PVC's (premature ventricular contractions)     Seasonal allergic rhinitis     Sleep apnea

## 2025-05-28 NOTE — PATIENT INSTRUCTIONS
Patient Education       Proper skin care from [facility]:    -Eliminate harsh soaps as they strip the natural oils from the skin, often resulting in dry itchy skin ( i.e. Dial, Zest, [place])  -Use mild soaps such as Cetaphil or Dove Sensitive Skin in the shower. You do not need to use soap on arms, legs, and trunk every time you shower unless visibly soiled.   -Avoid hot or cold showers.  -After showering, lightly dry off and apply moisturizing within 2-3 minutes. This will help trap moisture in the skin.   -Aggressive use of a moisturizer at least 1-2 times a day to the entire body (including -Vanicream, Cetaphil, Aquaphor or Cerave) and moisturize hands after every washing.  -We recommend using moisturizers that come in a tub that needs to be scooped out, not a pump. This has more of an oil base. It will hold moisture in your skin much better than a water base moisturizer. The above recommended are non-pore clogging.      Wear a sunscreen with at least SPF 30 on your face, ears, neck and V of the chest daily. Wear sunscreen on other areas of the body if those areas are exposed to the sun throughout the day. Sunscreens can contain physical and/or chemical blockers. Physical blockers are less likely to clog pores, these include zinc oxide and titanium dioxide. Reapply every two hour and after swimming.     Sunscreen examples: https://www.ewg.org/sunscreen/    UV radiation  UVA radiation remains constant throughout the day and throughout the year. It is a longer wavelength than UVB and therefore penetrates deeper into the skin leading to immediate and delayed tanning, photoaging, and skin cancer. 70-80% of UVA and UVB radiation occurs between the hours of 10am-2pm.  UVB radiation  UVB radiation causes the most harmful effects and is more significant during the summer months. However, snow and ice can reflect UVB radiation leading to skin damage during the winter months as well. UVB radiation is  responsible for tanning, burning, inflammation, delayed erythema (pinkness), pigmentation (brown spots), and skin cancer.     I recommend self monthly full body exams and yearly full body exams with a dermatology provider. If you develop a new or changing lesion please follow up for examination. Most skin cancers are pink and scaly or pink and pearly. However, we do see blue/brown/black skin cancers.  Consider the ABCDEs of melanoma when giving yourself your monthly full body exam ( don't forget the groin, buttocks, feet, toes, etc). A-asymmetry, B-borders, C-color, D-diameter, E-elevation or evolving. If you see any of these changes please follow up in clinic. If you cannot see your back I recommend purchasing a hand held mirror to use with a larger wall mirror.       Checking for Skin Cancer  You can find cancer early by checking your skin each month. There are 3 kinds of skin cancer. They are melanoma, basal cell carcinoma, and squamous cell carcinoma. Doing monthly skin checks is the best way to find new marks or skin changes. Follow the instructions below for checking your skin.   The ABCDEs of checking moles for melanoma   Check your moles or growths for signs of melanoma using ABCDE:   Asymmetry: the sides of the mole or growth don t match  Border: the edges are ragged, notched, or blurred  Color: the color within the mole or growth varies  Diameter: the mole or growth is larger than 6 mm (size of a pencil eraser)  Evolving: the size, shape, or color of the mole or growth is changing (evolving is not shown in the images below)    Checking for other types of skin cancer  Basal cell carcinoma or squamous cell carcinoma have symptoms such as:     A spot or mole that looks different from all other marks on your skin  Changes in how an area feels, such as itching, tenderness, or pain  Changes in the skin's surface, such as oozing, bleeding, or scaliness  A sore that does not heal  New swelling or redness beyond  the border of a mole    Who s at risk?  Anyone can get skin cancer. But you are at greater risk if you have:   Fair skin, light-colored hair, or light-colored eyes  Many moles or abnormal moles on your skin  A history of sunburns from sunlight or tanning beds  A family history of skin cancer  A history of exposure to radiation or chemicals  A weakened immune system  If you have had skin cancer in the past, you are at risk for recurring skin cancer.   How to check your skin  Do your monthly skin checkups in front of a full-length mirror. Check all parts of your body, including your:   Head (ears, face, neck, and scalp)  Torso (front, back, and sides)  Arms (tops, undersides, upper, and lower armpits)  Hands (palms, backs, and fingers, including under the nails)  Buttocks and genitals  Legs (front, back, and sides)  Feet (tops, soles, toes, including under the nails, and between toes)  If you have a lot of moles, take digital photos of them each month. Make sure to take photos both up close and from a distance. These can help you see if any moles change over time.   Most skin changes are not cancer. But if you see any changes in your skin, call your doctor right away. Only he or she can diagnose a problem. If you have skin cancer, seeing your doctor can be the first step toward getting the treatment that could save your life.   [employer] last reviewed this educational content on 4/1/2019 2000-2020 [facility]. [address] [place] [address], [place] [address]. All rights reserved. This information is not intended as a substitute for professional medical care. Always follow your healthcare professional's instructions.

## 2025-05-28 NOTE — LETTER
5/28/2025      Monty Fleming  [address]  [place] [facility] [place] [address]      Dear Colleague,    Thank you for referring your patient, Monty Fleming, to the [facility]. Please see a copy of my visit note below.    GI CLINIC VISIT           ASSESSMENT/PLAN:     # crohns - remains on weeky adalimumab - remains with intermittent constipation/diarrhea and abdominal pain - likely overlapping IBS but should restage disease activity with colonoscopy (is due regardless) and check trough/antibody level. For now will increase linaclotide and monitor.    # fatigue - disease activity assessment as well - will refer to rheumatology as well.     RTC 6 months      CC/REFERRING MD:  Ruby Beck  REASON FOR CONSULTATION:   Ruby Beck for   Chief Complaint   Patient presents with     Follow Up     Chron's disease follow up.         HPI  Monty presents today for follow-up of crohns. Overall symptoms unchanged over the last year. Continues to alternate between constipation with episodes of loose stools that last a few days. Also having abdominal pain. Does use dicyclomine which helps but is concerned about constipation with this.  Was switched to biosimilar adalimumab - no issues with this.  Other concern is profound fatigue which again has been a longstanding issue. Did meet with rheumatology in the past but wasn't able to follow-up as wasn't in network. Has been seeing PCP regarding this as well.     IBD History:  Time of diagnosis: 2018  Extent of disease: ileocolonic  Phenotype: stricturing  Previous therapies: humira at [facility] - now receiving weekly  EIM: joint pains  Previous surgeries: none for crohns, did have nissen in the past  Most recent endoscopy: 2022  Perianal skin tags found on perianal exam.                             - The examined portion of the ileum was normal.                             - Scarring with distortion of the IC valve and the                             cecum was found.  The IC valve was wide open and                             passed easily with the pediatric colonoscope. There                             was some granularity and small pseudopolyps at the                             IC valve but no active inflammation. . Biopsied.                             - A single ulcer in the transverse colon. Biopsied.                             - One 2 mm polyp in the ascending colon, removed                             with a cold biopsy forceps. Resected and retrieved.                             - One 3 mm polyp in the sigmoid colon, removed with                             a cold biopsy forceps. Resected and retrieved.                             - Diverticulosis in the sigmoid colon.                             - Simple Endoscopic Score for Crohn's Disease: 3,                             mucosal inflammatory changes secondary to Crohn's                             disease. Biopsied.                             Overall there is minimal evidence of any Crohn's                             Disease activity in the colon. One small 4 mm                             ulcer. Otherwise no activity.   Impression:               - Normal esophagus.                             - Z-line regular, 43 cm from the incisors.                             - A Nissen fundoplication was found. The wrap                             appears intact.                             - A patch of friable gastric mucosa in the distal                             gastric body. Biopsied.                             - Normal examined duodenum. Biopsied.                             Overall unremarkable exam. Mild friability in the                             gastric body was biopsied. Can consider trial of                             omeprazole 40 mg daily for 2 months to see if this                             offers any benefit to chronic epigastric/LUQ pain.      A.  Duodenum: Biopsy:  - Duodenal mucosa within normal  limits  - Normal villous architecture with no increase in intraepithelial lymphocytes      B.  Stomach: Biopsy:  - Oxyntic type mucosa with mucosal erosion and reactive epithelial changes  - Antral type mucosa within normal limits  - Immunostain for Helicobacter pylori is in process, with the results to be reported in an addendum      C.  Ileocecal valve: Biopsy:  - Chronic, focally active colitis with architectural disarray, basal plasmacytosis, and focal active cryptitis  - No dysplasia     D.  Colon, ascending: Polypectomy:  - Tubular adenoma  - No evidence of high-grade dysplasia or invasive malignancy      E.  Colon, cecum/ascending: Biopsy:  - Colonic mucosa within normal limits  - No active or chronic colitis  - No dysplasia      F.  Colon, transverse: Biopsy:  - Colonic mucosa within normal limits  - No active or chronic colitis  - No dysplasia      G.  Colon, transverse, ulcer: Biopsy:  - Colonic mucosa with lamina propria hyalinization, withered crypts, and focal active inflammation, see comment     H.  Colon, descending/sigmoid: Biopsy:  - Colonic mucosa within normal limits  - No active or chronic colitis  - No dysplasia       I.  Colon, sigmoid: Polypectomy:  - Tubular adenoma  - No evidence of high-grade dysplasia or invasive malignancy       J.  Rectum: Biopsy:  - Colonic mucosa within normal limits  - No active or chronic colitis  - No dysplasia        ROS:    No fevers or chills  No weight loss  No blurry vision, double vision or change in vision  No sore throat  No lymphadenopathy  No headache, paraesthesias, or weakness in a limb  No shortness of breath or wheezing  No chest pain or pressure  No arthralgias or myalgias  No rashes or skin changes  No odynophagia or dysphagia  No BRBPR, hematochezia, melena  No dysuria, frequency or urgency  No hot/cold intolerance or polyria  No anxiety or depression    PROBLEM LIST  Patient Active Problem List    Diagnosis Date Noted     GERD (gastroesophageal  reflux disease) 05/04/2023     Priority: Medium     History of pneumonia 05/04/2023     Priority: Medium     History of irritable bowel syndrome 05/04/2023     Priority: Medium     Insufficient sleep syndrome 10/01/2021     Priority: Medium     Hyperglycemia 06/02/2019     Priority: Medium     Crohn's disease of colon with complication (H) 01/04/2019     Priority: Medium     Chronic bursitis of left shoulder 09/26/2018     Priority: Medium     Allergic rhinitis 09/30/2017     Priority: Medium     Fatigue 03/20/2017     Priority: Medium     Dysphagia, unspecified 10/13/2015     Priority: Medium     ED (erectile dysfunction) 09/19/2015     Priority: Medium     Hypogonadism male 09/19/2015     Priority: Medium     CAROLE on CPAP 09/19/2015     Priority: Medium     Swallowing dysfunction 09/19/2015     Priority: Medium     Ventricular arrhythmia 09/19/2015     Priority: Medium     Vitamin D deficiency 09/19/2015     Priority: Medium     Bursitis, infrapatellar or subpatellar 08/14/2015     Priority: Medium     Tendonitis of ankle, left 08/14/2015     Priority: Medium     Cough, persistent 05/18/2015     Priority: Medium     Hearing loss on left 05/18/2015     Priority: Medium     Abnormal MRI of head 12/31/2014     Priority: Medium     Migraine headache 12/31/2014     Priority: Medium     Peripheral vestibulopathy 03/14/2014     Priority: Medium     Formatting of this note might be different from the original.  Left ear, symptoms resolved with vestibular rehab         PERTINENT PAST MEDICAL HISTORY:  Past Medical History:   Diagnosis Date     Crohn's disease (H) 2018     Dysplastic nevus      ED (erectile dysfunction)      Fatty liver      Gastroesophageal reflux disease with esophagitis      Migraine      Plantar fasciitis      PONV (postoperative nausea and vomiting)      PVC's (premature ventricular contractions)      Seasonal allergic rhinitis      Sleep apnea        PREVIOUS SURGERIES:  Past Surgical History:    Procedure Laterality Date     COLONOSCOPY N/A 6/22/2022    Procedure: COLONOSCOPY, WITH POLYPECTOMY AND BIOPSY;  Surgeon: Ariel Drew MD;  Location:  GI     ESOPHAGOSCOPY, GASTROSCOPY, DUODENOSCOPY (EGD), COMBINED N/A 6/22/2022    Procedure: ESOPHAGOGASTRODUODENOSCOPY, WITH BIOPSY;  Surgeon: Ariel Drew MD;  Location:  GI     IR LUMBAR PUNCTURE  5/21/2024     NISSEN FUNDOPLICATION N/A 2016    x 2       ALLERGIES:   No Known Allergies    PERTINENT MEDICATIONS:    Current Outpatient Medications:      acetaminophen (TYLENOL) 500 MG tablet, Take 500 mg by mouth, Disp: , Rfl:      Adalimumab (HUMIRA, 2 PEN,) 40 MG/0.4ML pen kit, Inject 0.4 mLs (40 mg) subcutaneously every 7 days., Disp: 1.6 mL, Rfl: 5     Ascorbic Acid (VITAMIN C) 500 MG CHEW, Take 500 mg by mouth every morning, Disp: , Rfl:      cholecalciferol (VITAMIN D3) 125 mcg (5000 units) capsule, Take 2 capsules by mouth daily, Disp: , Rfl:      cyanocobalamin (VITAMIN B-12) 1000 MCG tablet, Take 1,000 mcg by mouth daily, Disp: , Rfl:      diclofenac (VOLTAREN) 1 % topical gel, Apply 2 g topically 4 times daily, Disp: 100 g, Rfl: 11     dicyclomine (BENTYL) 10 MG capsule, Take 1 capsule (10 mg) by mouth 4 times daily as needed (abdominal pain), Disp: 270 capsule, Rfl: 3     eletriptan (RELPAX) 40 MG tablet, Take 40 mg by mouth at onset of headache for migraine Ave per month 2 tabs, Disp: , Rfl:      fexofenadine (ALLEGRA) 180 MG tablet, Take 180 mg by mouth, Disp: , Rfl:      fluticasone (FLONASE) 50 MCG/ACT nasal spray, 1 spray, Disp: , Rfl:      hyoscyamine ER (LEVBID) 375 mcg 12 hr tablet, Take 0.375 mg by mouth every 12 hours as needed , Disp: , Rfl:      linaclotide (LINZESS) 72 MCG capsule, Take 1 capsule (72 mcg) by mouth every morning (before breakfast), Disp: 90 capsule, Rfl: 11     OnabotulinumtoxinA (BOTOX IJ), Inject as directed. For headaches, Disp: , Rfl:      ondansetron (ZOFRAN-ODT) 4 MG ODT tab, Take 4 mg by  "mouth every 6 hours as needed , Disp: , Rfl:      polyethylene glycol (MIRALAX) 17 GM/Dose powder, Take 1 Capful by mouth 2 times daily as needed for constipation., Disp: , Rfl:      tadalafil (CIALIS) 20 MG tablet, Take 20 mg by mouth, Disp: , Rfl:     SOCIAL HISTORY:  Social History     Socioeconomic History     Marital status:      Spouse name: Not on file     Number of children: Not on file     Years of education: Not on file     Highest education level: Not on file   Occupational History     Not on file   Tobacco Use     Smoking status: Never     Smokeless tobacco: Never   Vaping Use     Vaping status: Never Used   Substance and Sexual Activity     Alcohol use: Never     Drug use: Not on file     Sexual activity: Yes     Partners: Female   Other Topics Concern     Not on file   Social History Narrative     Not on file     Social Drivers of Health     Financial Resource Strain: Not on file   Food Insecurity: Not on file   Transportation Needs: Not on file   Physical Activity: Not on file   Stress: Not on file   Social Connections: Not on file   Interpersonal Safety: Not on file   Housing Stability: Not on file       FAMILY HISTORY:  Family History   Problem Relation Age of Onset     Diabetes Maternal Grandfather      Coronary Artery Disease Paternal Grandfather        Past/family/social history reviewed and no changes    PHYSICAL EXAMINATION:  Constitutional: aaox3, cooperative, pleasant, not dyspneic/diaphoretic, no acute distress  Vitals reviewed: /88 (BP Location: Left arm, Patient Position: Sitting, Cuff Size: Adult Regular)   Pulse 69   Ht 1.865 m (6' 1.43\")   Wt 102 kg (224 lb 14.4 oz)   SpO2 98%   BMI 29.33 kg/m    Wt:   Wt Readings from Last 2 Encounters:   05/28/25 102 kg (224 lb 14.4 oz)   05/20/25 97.5 kg (215 lb)      Eyes: Sclera anicteric/injected  Ears/nose/mouth/throat: Normal oropharynx without ulcers or exudate, mucus membranes moist, hearing intact  Neck: supple, thyroid " normal size  CV: No edema  Respiratory: Unlabored breathing  Skin: warm, perfused, no jaundice  Psych: Normal affect  MSK: Normal gait      PERTINENT STUDIES:  Most recent CBC:  Recent Labs   Lab Test 04/29/25 1347 07/12/24  1010   WBC 7.2 5.6   HGB 16.1 15.8   HCT 47.5 46.7    216     Most recent hepatic panel:  Recent Labs   Lab Test 04/29/25 1347 07/12/24  1010   ALT 94* 84*   AST 53* 65*     Most recent creatinine:  Recent Labs   Lab Test 04/29/25 1347 07/12/24  1010   CR 0.78 0.83                Again, thank you for allowing me to participate in the care of your patient.        Sincerely,        Ruby Beck, DO    Electronically signed

## 2025-05-28 NOTE — LETTER
5/28/2025      Monty Fleming  [address]  [place] [address]      Dear Colleague,    Thank you for referring your patient, Monty Fleming, to the [facility]. Please see a copy of my visit note below.    [facility] Dermatology Note  Encounter Date: May 28, 2025  Office Visit      Dermatology Problem List:  [facility]: 5/28/25    # History of DN x2 - patient reported, records requested  - left calf, ~2017  - second DN ~ 2010    PMHx: Chron's - Humira since 2018   Social history: Lived in [place] and [place]. Moved to [place] for job.  ____________________________________________     Assessment & Plan:     # On Humira for Chron's Disease. Discussed increased risk of skin cancers with immunosuppressing medications.   - Recommend sunscreens SPF #30 or greater, protective clothing and avoidance of tanning beds.   - Recommend yearly skin exams.     # Hx DNs  - edu on increased melanoma risk  - continue annual skin exams    # Benign findings: multiple benign nevi, lentigines, cherry angiomas, SKs  - edu on benign etiology  - Signs and Symptoms of non-melanoma skin cancer and ABCDEs of melanoma reviewed with patient. Patient encouraged to perform monthly self skin exams and educated on how to perform them. UV precautions reviewed with patient. Patient was asked about new or changing moles/lesions on body.   - Sunscreen: Apply 20 minutes prior to going outdoors and reapply every two hours, when wet or sweating. We recommend using an SPF 30 or higher, and to use one that is water resistant.     - RTC for changes    Procedures Performed:   None    Follow-up: 1 year(s) in-person, or earlier for new or changing lesions    Staff and scribe:    Scribe Disclosure:   I, FAZAL FORRESTER, am serving as a scribe; to document services personally performed by Lorena Arias PA-C -based on data collection and the provider's statements to me.     Provider Disclosure:  I agree with above History,  Review of Systems, Physical exam and Plan.  I have reviewed the content of the documentation and have edited it as needed. I have personally performed the services documented here and the documentation accurately represents those services and the decisions I have made.      Electronically signed by:    All risks, benefits and alternatives were discussed with patient.  Patient is in agreement and understands the assessment and plan.  All questions were answered.    Lorena Arias PA-C, MPAS  [facility]: Phone: [phone], Fax: [phone]  [facility]: Phone: [phone],  Fax: [phone]  [facility]: Phone: [phone], Fax: [phone]  ____________________________________________    CC: No chief complaint on file.      Reviewed patients past medical history and pertinent chart review prior to patient's visit today.     HPI:  Mr. Monty Fleming is a 49 year old male who presents today [place] [facility]     Today patient reported a spot of concern on his L inner ear.     Has a hx of DN    Patient is otherwise feeling well, without additional concerns.    Labs:  N/A    Physical Exam:  Vitals: There were no vitals taken for this visit.  SKIN: Total skin excluding the undergarment areas was performed. The exam included the head/face, neck, both arms, chest, back, abdomen, both legs, digits and/or nails.    - - Quezada's skin type II, has <100 nevi  - There are dome shaped bright red papules on the trunk.   - Multiple regular brown pigmented macules and papules are identified on the trunk and extremities.   - Scattered brown macules on sun exposed areas.  - There are waxy stuck on tan to brown papules on the trunk.    - No other lesions of concern on areas examined.     Medications:  Current Outpatient Medications   Medication Sig Dispense Refill     acetaminophen (TYLENOL) 500 MG tablet  Take 500 mg by mouth       Adalimumab (HUMIRA, 2 PEN,) 40 MG/0.4ML pen kit Inject 0.4 mLs (40 mg) subcutaneously every 7 days. 1.6 mL 5     Ascorbic Acid (VITAMIN C) 500 MG CHEW Take 500 mg by mouth every morning       cholecalciferol (VITAMIN D3) 125 mcg (5000 units) capsule Take 2 capsules by mouth daily       cyanocobalamin (VITAMIN B-12) 1000 MCG tablet Take 1,000 mcg by mouth daily       diclofenac (VOLTAREN) 1 % topical gel Apply 2 g topically 4 times daily 100 g 11     dicyclomine (BENTYL) 10 MG capsule Take 1 capsule (10 mg) by mouth 4 times daily as needed (abdominal pain) 270 capsule 3     eletriptan (RELPAX) 40 MG tablet Take 40 mg by mouth at onset of headache for migraine Ave per month 2 tabs       fexofenadine (ALLEGRA) 180 MG tablet Take 180 mg by mouth       fluticasone (FLONASE) 50 MCG/ACT nasal spray 1 spray       hyoscyamine ER (LEVBID) 375 mcg 12 hr tablet Take 0.375 mg by mouth every 12 hours as needed        linaclotide (LINZESS) 72 MCG capsule Take 2 capsules (144 mcg) by mouth every morning (before breakfast). 180 capsule 2     linaclotide (LINZESS) 72 MCG capsule Take 1 capsule (72 mcg) by mouth every morning (before breakfast) 90 capsule 11     OnabotulinumtoxinA (BOTOX IJ) Inject as directed. For headaches       ondansetron (ZOFRAN-ODT) 4 MG ODT tab Take 4 mg by mouth every 6 hours as needed        polyethylene glycol (MIRALAX) 17 GM/Dose powder Take 1 Capful by mouth 2 times daily as needed for constipation.       tadalafil (CIALIS) 20 MG tablet Take 20 mg by mouth       No current facility-administered medications for this visit.      Past Medical/Surgical History:   Patient Active Problem List   Diagnosis     Abnormal MRI of head     Allergic rhinitis     Bursitis, infrapatellar or subpatellar     Chronic bursitis of left shoulder     Cough, persistent     Crohn's disease of colon with complication (H)     ED (erectile dysfunction)     Dysphagia, unspecified     GERD (gastroesophageal  reflux disease)     Fatigue     Hearing loss on left     History of pneumonia     History of irritable bowel syndrome     Hyperglycemia     Hypogonadism male     Insufficient sleep syndrome     Migraine headache     CAROLE on CPAP     Peripheral vestibulopathy     Swallowing dysfunction     Tendonitis of ankle, left     Ventricular arrhythmia     Vitamin D deficiency     Past Medical History:   Diagnosis Date     Crohn's disease (H) 2018     Dysplastic nevus      ED (erectile dysfunction)      Fatty liver      Gastroesophageal reflux disease with esophagitis      Migraine      Plantar fasciitis      PONV (postoperative nausea and vomiting)      PVC's (premature ventricular contractions)      Seasonal allergic rhinitis      Sleep apnea                       Again, thank you for allowing me to participate in the care of your patient.        Sincerely,        Lorena Arias PA-C    Electronically signed

## 2025-05-28 NOTE — PROGRESS NOTES
GI CLINIC VISIT           ASSESSMENT/PLAN:     # crohns - remains on weeky adalimumab - remains with intermittent constipation/diarrhea and abdominal pain - likely overlapping IBS but should restage disease activity with colonoscopy (is due regardless) and check trough/antibody level. For now will increase linaclotide and monitor.    # fatigue - disease activity assessment as well - will refer to rheumatology as well.     RTC 6 months      CC/REFERRING MD:  Ruby Beck  REASON FOR CONSULTATION:   Ruby Beck for   Chief Complaint   Patient presents with    Follow Up     Chron's disease follow up.         VERN  Monty presents today for follow-up of crohns. Overall symptoms unchanged over the last year. Continues to alternate between constipation with episodes of loose stools that last a few days. Also having abdominal pain. Does use dicyclomine which helps but is concerned about constipation with this.  Was switched to biosimilar adalimumab - no issues with this.  Other concern is profound fatigue which again has been a longstanding issue. Did meet with rheumatology in the past but wasn't able to follow-up as wasn't in network. Has been seeing PCP regarding this as well.     IBD History:  Time of diagnosis: 2018  Extent of disease: ileocolonic  Phenotype: stricturing  Previous therapies: humira at [id number] - now receiving weekly  EIM: joint pains  Previous surgeries: none for crohns, did have nissen in the past  Most recent endoscopy: 2022  Perianal skin tags found on perianal exam.                             - The examined portion of the ileum was normal.                             - Scarring with distortion of the IC valve and the                             cecum was found. The IC valve was wide open and                             passed easily with the pediatric colonoscope. There                             was some granularity and small pseudopolyps at the                             IC valve but  no active inflammation. . Biopsied.                             - A single ulcer in the transverse colon. Biopsied.                             - One 2 mm polyp in the ascending colon, removed                             with a cold biopsy forceps. Resected and retrieved.                             - One 3 mm polyp in the sigmoid colon, removed with                             a cold biopsy forceps. Resected and retrieved.                             - Diverticulosis in the sigmoid colon.                             - Simple Endoscopic Score for Crohn's Disease: 3,                             mucosal inflammatory changes secondary to Crohn's                             disease. Biopsied.                             Overall there is minimal evidence of any Crohn's                             Disease activity in the colon. One small 4 mm                             ulcer. Otherwise no activity.   Impression:               - Normal esophagus.                             - Z-line regular, 43 cm from the incisors.                             - A Nissen fundoplication was found. The wrap                             appears intact.                             - A patch of friable gastric mucosa in the distal                             gastric body. Biopsied.                             - Normal examined duodenum. Biopsied.                             Overall unremarkable exam. Mild friability in the                             gastric body was biopsied. Can consider trial of                             omeprazole 40 mg daily for 2 months to see if this                             offers any benefit to chronic epigastric/LUQ pain.      A.  Duodenum: Biopsy:  - Duodenal mucosa within normal limits  - Normal villous architecture with no increase in intraepithelial lymphocytes      B.  Stomach: Biopsy:  - Oxyntic type mucosa with mucosal erosion and reactive epithelial changes  - Antral type mucosa within normal limits  -  Immunostain for Helicobacter pylori is in process, with the results to be reported in an addendum      C.  Ileocecal valve: Biopsy:  - Chronic, focally active colitis with architectural disarray, basal plasmacytosis, and focal active cryptitis  - No dysplasia     D.  Colon, ascending: Polypectomy:  - Tubular adenoma  - No evidence of high-grade dysplasia or invasive malignancy      E.  Colon, cecum/ascending: Biopsy:  - Colonic mucosa within normal limits  - No active or chronic colitis  - No dysplasia      F.  Colon, transverse: Biopsy:  - Colonic mucosa within normal limits  - No active or chronic colitis  - No dysplasia      G.  Colon, transverse, ulcer: Biopsy:  - Colonic mucosa with lamina propria hyalinization, withered crypts, and focal active inflammation, see comment     H.  Colon, descending/sigmoid: Biopsy:  - Colonic mucosa within normal limits  - No active or chronic colitis  - No dysplasia       I.  Colon, sigmoid: Polypectomy:  - Tubular adenoma  - No evidence of high-grade dysplasia or invasive malignancy       J.  Rectum: Biopsy:  - Colonic mucosa within normal limits  - No active or chronic colitis  - No dysplasia        ROS:    No fevers or chills  No weight loss  No blurry vision, double vision or change in vision  No sore throat  No lymphadenopathy  No headache, paraesthesias, or weakness in a limb  No shortness of breath or wheezing  No chest pain or pressure  No arthralgias or myalgias  No rashes or skin changes  No odynophagia or dysphagia  No BRBPR, hematochezia, melena  No dysuria, frequency or urgency  No hot/cold intolerance or polyria  No anxiety or depression    PROBLEM LIST  Patient Active Problem List    Diagnosis Date Noted    GERD (gastroesophageal reflux disease) 05/04/2023     Priority: Medium    History of pneumonia 05/04/2023     Priority: Medium    History of irritable bowel syndrome 05/04/2023     Priority: Medium    Insufficient sleep syndrome 10/01/2021     Priority: Medium     Hyperglycemia 06/02/2019     Priority: Medium    Crohn's disease of colon with complication (H) 01/04/2019     Priority: Medium    Chronic bursitis of left shoulder 09/26/2018     Priority: Medium    Allergic rhinitis 09/30/2017     Priority: Medium    Fatigue 03/20/2017     Priority: Medium    Dysphagia, unspecified 10/13/2015     Priority: Medium    ED (erectile dysfunction) 09/19/2015     Priority: Medium    Hypogonadism male 09/19/2015     Priority: Medium    CAROLE on CPAP 09/19/2015     Priority: Medium    Swallowing dysfunction 09/19/2015     Priority: Medium    Ventricular arrhythmia 09/19/2015     Priority: Medium    Vitamin D deficiency 09/19/2015     Priority: Medium    Bursitis, infrapatellar or subpatellar 08/14/2015     Priority: Medium    Tendonitis of ankle, left 08/14/2015     Priority: Medium    Cough, persistent 05/18/2015     Priority: Medium    Hearing loss on left 05/18/2015     Priority: Medium    Abnormal MRI of head 12/31/2014     Priority: Medium    Migraine headache 12/31/2014     Priority: Medium    Peripheral vestibulopathy 03/14/2014     Priority: Medium     Formatting of this note might be different from the original.  Left ear, symptoms resolved with vestibular rehab         PERTINENT PAST MEDICAL HISTORY:  Past Medical History:   Diagnosis Date    Crohn's disease (H) 2018    Dysplastic nevus     ED (erectile dysfunction)     Fatty liver     Gastroesophageal reflux disease with esophagitis     Migraine     Plantar fasciitis     PONV (postoperative nausea and vomiting)     PVC's (premature ventricular contractions)     Seasonal allergic rhinitis     Sleep apnea        PREVIOUS SURGERIES:  Past Surgical History:   Procedure Laterality Date    COLONOSCOPY N/A 6/22/2022    Procedure: COLONOSCOPY, WITH POLYPECTOMY AND BIOPSY;  Surgeon: Ariel Drew MD;  Location:  GI    ESOPHAGOSCOPY, GASTROSCOPY, DUODENOSCOPY (EGD), COMBINED N/A 6/22/2022    Procedure:  ESOPHAGOGASTRODUODENOSCOPY, WITH BIOPSY;  Surgeon: Ariel Drew MD;  Location: [facility]    IR LUMBAR PUNCTURE  5/21/2024    NISSEN FUNDOPLICATION N/A 2016    x 2       ALLERGIES:   No Known Allergies    PERTINENT MEDICATIONS:    Current Outpatient Medications:     acetaminophen (TYLENOL) 500 MG tablet, Take 500 mg by mouth, Disp: , Rfl:     Adalimumab (HUMIRA, 2 PEN,) 40 MG/0.4ML pen kit, Inject 0.4 mLs (40 mg) subcutaneously every 7 days., Disp: 1.6 mL, Rfl: 5    Ascorbic Acid (VITAMIN C) 500 MG CHEW, Take 500 mg by mouth every morning, Disp: , Rfl:     cholecalciferol (VITAMIN D3) 125 mcg (5000 units) capsule, Take 2 capsules by mouth daily, Disp: , Rfl:     cyanocobalamin (VITAMIN B-12) 1000 MCG tablet, Take 1,000 mcg by mouth daily, Disp: , Rfl:     diclofenac (VOLTAREN) 1 % topical gel, Apply 2 g topically 4 times daily, Disp: 100 g, Rfl: 11    dicyclomine (BENTYL) 10 MG capsule, Take 1 capsule (10 mg) by mouth 4 times daily as needed (abdominal pain), Disp: 270 capsule, Rfl: 3    eletriptan (RELPAX) 40 MG tablet, Take 40 mg by mouth at onset of headache for migraine Ave per month 2 tabs, Disp: , Rfl:     fexofenadine (ALLEGRA) 180 MG tablet, Take 180 mg by mouth, Disp: , Rfl:     fluticasone (FLONASE) 50 MCG/ACT nasal spray, 1 spray, Disp: , Rfl:     hyoscyamine ER (LEVBID) 375 mcg 12 hr tablet, Take 0.375 mg by mouth every 12 hours as needed , Disp: , Rfl:     linaclotide (LINZESS) 72 MCG capsule, Take 1 capsule (72 mcg) by mouth every morning (before breakfast), Disp: 90 capsule, Rfl: 11    OnabotulinumtoxinA (BOTOX IJ), Inject as directed. For headaches, Disp: , Rfl:     ondansetron (ZOFRAN-ODT) 4 MG ODT tab, Take 4 mg by mouth every 6 hours as needed , Disp: , Rfl:     polyethylene glycol (MIRALAX) 17 GM/Dose powder, Take 1 Capful by mouth 2 times daily as needed for constipation., Disp: , Rfl:     tadalafil (CIALIS) 20 MG tablet, Take 20 mg by mouth, Disp: , Rfl:     SOCIAL HISTORY:  Social  "History     Socioeconomic History    Marital status:      Spouse name: Not on file    Number of children: Not on file    Years of education: Not on file    Highest education level: Not on file   Occupational History    Not on file   Tobacco Use    Smoking status: Never    Smokeless tobacco: Never   Vaping Use    Vaping status: Never Used   Substance and Sexual Activity    Alcohol use: Never    Drug use: Not on file    Sexual activity: Yes     Partners: Female   Other Topics Concern    Not on file   Social History Narrative    Not on file     Social Drivers of Health     Financial Resource Strain: Not on file   Food Insecurity: Not on file   Transportation Needs: Not on file   Physical Activity: Not on file   Stress: Not on file   Social Connections: Not on file   Interpersonal Safety: Not on file   Housing Stability: Not on file       FAMILY HISTORY:  Family History   Problem Relation Age of Onset    Diabetes Maternal Grandfather     Coronary Artery Disease Paternal Grandfather        Past/family/social history reviewed and no changes    PHYSICAL EXAMINATION:  Constitutional: aaox3, cooperative, pleasant, not dyspneic/diaphoretic, no acute distress  Vitals reviewed: /88 (BP Location: Left arm, Patient Position: Sitting, Cuff Size: Adult Regular)   Pulse 69   Ht 1.865 m (6' 1.43\")   Wt 102 kg (224 lb 14.4 oz)   SpO2 98%   BMI 29.33 kg/m    Wt:   Wt Readings from Last 2 Encounters:   05/28/25 102 kg (224 lb 14.4 oz)   05/20/25 97.5 kg (215 lb)      Eyes: Sclera anicteric/injected  Ears/nose/mouth/throat: Normal oropharynx without ulcers or exudate, mucus membranes moist, hearing intact  Neck: supple, thyroid normal size  CV: No edema  Respiratory: Unlabored breathing  Skin: warm, perfused, no jaundice  Psych: Normal affect  MSK: Normal gait      PERTINENT STUDIES:  Most recent CBC:  Recent Labs   Lab Test 04/29/25  1347 07/12/24  1010   WBC 7.2 5.6   HGB 16.1 15.8   HCT 47.5 46.7    216 "     Most recent hepatic panel:  Recent Labs   Lab Test 04/29/25  1347 07/12/24  1010   ALT 94* 84*   AST 53* 65*     Most recent creatinine:  Recent Labs   Lab Test 04/29/25  1347 07/12/24  1010   CR 0.78 0.83

## 2025-05-28 NOTE — NURSING NOTE
"Chief Complaint   Patient presents with    Follow Up     Chron's disease follow up.     He requests these members of his care team be copied on today's visit information:  PCP: Jaron Vines    Vitals:    05/28/25 0918   BP: 133/88   BP Location: Left arm   Patient Position: Sitting   Cuff Size: Adult Regular   Pulse: 69   SpO2: 98%   Weight: 102 kg (224 lb 14.4 oz)   Height: 1.865 m (6' 1.43\")     Body mass index is 29.33 kg/m .    Medications were reconciled.    Does patient need any medication refills at today's visit? No        Florinda Brooks CMA        "

## 2025-05-28 NOTE — NURSING NOTE
Monty Fleming's chief complaint for this visit includes:  Chief Complaint   Patient presents with    Skin Check     Patient here for FBSC. There is one spot on his L inner ear. Hx of DN.     PCP: Jaron Vines    Referring Provider:  Referred Self, MD  No address on file    There were no vitals taken for this visit.  Moderate Pain (4)      No Known Allergies      Do you need any medication refills at today's visit?     Sujey Teague on 5/28/2025 at 11:17 AM

## 2025-05-28 NOTE — PATIENT INSTRUCTIONS
It was a pleasure meeting with you today and discussing your healthcare plan. Below is a summary of what we covered:    Please see rheumatology  Try increasing linzess to two tablets daily to see if it helps - let me know if it doesn't  Please have a follow-up colonoscopy  Please have a humira level drawn      Please see below for any additional questions and scheduling guidelines.    Sign up for [employer]: [employer] patient portal serves as a secure platform for accessing your medical records from the [facility]. Additionally, [employer] facilitates easy, timely, and secure messaging with your care team. If you have not signed up, you may do so by using the provided code or calling [phone].    Coordinating your care after your visit:  There are multiple options for scheduling your follow-up care based on your provider's recommendation.    How do I schedule a follow-up clinic appointment:   After your appointment, you may receive scheduling assistance with the Clinic Coordinators by having a seat in the waiting room and a Clinic Coordinator will call you up to schedule.  Virtual visits or after you leave the clinic:  Your provider has placed a follow-up order in the [employer] portal for scheduling your return appointment. A member of the scheduling team will contact you to schedule.  [employer] Scheduling: Timely scheduling through [employer] is advised to ensure appointment availability.   Call to schedule: You may schedule your follow-up appointment(s) by calling [phone], option 1.    How do I schedule my endoscopy or colonoscopy procedure:  If a procedure, such as a colonoscopy or upper endoscopy was ordered by your provider, the scheduling team will contact you to schedule this procedure. Or you may choose to call to schedule at   [phone], option 2.  Please allow 20-30 minutes when scheduling a procedure.    How do I get my blood work done? To get your blood work done, you need to schedule a lab  appointment at an [facility]. There are multiple ways to schedule:   At the clinic: The Clinic Coordinator you meet after your visit can help you schedule a lab appointment.   [employer] scheduling: [employer] offers online lab scheduling at all [facility] laboratory locations.   Call to schedule: You can call [phone] to schedule your lab appointment.    How do I schedule my imaging study: To schedule imaging studies, such as CT scans, ultrasounds, MRIs, or X-rays, contact Imaging Services at [phone].    How do I schedule a referral to another doctor: If your provider recommended a referral to another specialist(s), the referral order was placed by your provider. You will receive a phone call to schedule this referral, or you may choose to call the number attached to the referral to self-schedule.    For Post-Visit Question(s):  For any inquiries following today's visit:  Please utilize [employer] messaging and allow 48 hours for reply or contact the Call Center during normal business hours at [phone], option 3.  For Emergent After-hours questions, contact the On-Call GI Fellow through the [facility]  at [phone].  In addition, you may contact your Nurse directly using the provided contact information.    Test Results:  Test results will be accessible via [employer] in compliance with the 21st Century Cures Act. This means that your results will be available to you at the same time as your provider. Often you may see your results before your provider does. Results are reviewed by staff within two weeks with communication follow-up. Results may be released in the patient portal prior to your care team review.    Prescription Refill(s):  Medication prescribed by your provider will be addressed during your visit. For future refills, please coordinate with your pharmacy. If you have not had a recent clinic visit or routine labs, for your safety, your provider may not be  able to refill your prescription.

## 2025-05-29 ENCOUNTER — PATIENT OUTREACH (OUTPATIENT)
Dept: CARE COORDINATION | Facility: CLINIC | Age: 50
End: 2025-05-29
Payer: COMMERCIAL

## 2025-05-29 ENCOUNTER — DOCUMENTATION ONLY (OUTPATIENT)
Dept: LAB | Facility: CLINIC | Age: 50
End: 2025-05-29
Payer: COMMERCIAL

## 2025-05-29 DIAGNOSIS — K50.80 CROHN'S DISEASE OF BOTH SMALL AND LARGE INTESTINE WITHOUT COMPLICATION (H): Primary | ICD-10-CM

## 2025-05-29 NOTE — PROGRESS NOTES
Monty Fleming has an upcoming lab appointment:    Future Appointments   Date Time Provider Department Center   5/30/2025  9:30 AM [facility] [place]   1/14/2026  9:30 AM Ruby Beck, [facility]     Patient is scheduled for the following lab(s): Adalimumab Level and Antibodies per Dr. Beck     There is no order available for Adalimumab Level and Antibodies per Dr. Beck. Please review and place orders as appropriate.    Health Maintenance Due   Topic    ANNUAL REVIEW OF  ORDERS     HIV SCREENING     LIPID      Thanks,  Jairo Galloway

## 2025-05-30 ENCOUNTER — TRANSFERRED RECORDS (OUTPATIENT)
Dept: HEALTH INFORMATION MANAGEMENT | Facility: CLINIC | Age: 50
End: 2025-05-30

## 2025-06-02 ENCOUNTER — PATIENT OUTREACH (OUTPATIENT)
Dept: CARE COORDINATION | Facility: CLINIC | Age: 50
End: 2025-06-02
Payer: COMMERCIAL

## 2025-06-05 ENCOUNTER — RESULTS FOLLOW-UP (OUTPATIENT)
Dept: GASTROENTEROLOGY | Facility: CLINIC | Age: 50
End: 2025-06-05

## 2025-06-11 ENCOUNTER — HOSPITAL ENCOUNTER (OUTPATIENT)
Facility: AMBULATORY SURGERY CENTER | Age: 50
End: 2025-06-11
Payer: COMMERCIAL

## 2025-06-11 ENCOUNTER — TELEPHONE (OUTPATIENT)
Dept: GASTROENTEROLOGY | Facility: CLINIC | Age: 50
End: 2025-06-11
Payer: COMMERCIAL

## 2025-06-11 NOTE — TELEPHONE ENCOUNTER
"Endoscopy Scheduling Screen    Caller: patient    Have you had any respiratory illness or flu-like symptoms in the last 10 days?  No    What is your communication preference for Instructions and/or Bowel Prep?   MyChart    What insurance is in the chart?  Other:  [facility]    Ordering/Referring Provider:   GILDA DELGADO      (If ordering provider performs procedure, schedule with ordering provider unless otherwise instructed. )    BMI: Estimated body mass index is 29.33 kg/m  as calculated from the following:    Height as of 5/28/25: 1.865 m (6' 1.43\").    Weight as of 5/28/25: 102 kg (224 lb 14.4 oz).     Sedation Ordered  MAC/deep sedation.   BMI<= 45 45 < BMI <= 48 48 < BMI < = 50  BMI > 50   No Restrictions No [facility]  No [facility]  [facility] with exceptions Hospital Only OR Only       Do you have a history of malignant hyperthermia?  No    (Females) Are you currently pregnant?   No     Have you been diagnosed or told you have pulmonary hypertension?   No    Do you have an LVAD?  No    Have you been told you have moderate to severe sleep apnea?  Yes. Do you use a CPAP? Yes Where is the patient located?. (RN Review required for scheduling unless scheduling in Hospital.)     Have you been told you have COPD, asthma, or any other lung disease?  No    Has your doctor ordered any cardiac tests like echo, angiogram, stress test, ablation, or EKG, that you have not completed yet?  No    Do you  have a history of any heart conditions?  Yes  PVCs    Have you had any hospitalizations  in the last year for heart related issues, for example a stent placement, heart attack, or cardiomyopathy?  No    Do you have any implantable devices in your body (pacemaker, ICD)?  No    Do you take nitroglycerine?  No    Have you ever had or are you waiting for an organ transplant?  No. Continue scheduling, no site restrictions.    Have you had a stroke or transient ischemic attack (TIA aka \"mini stroke\") in the last 2 " "years?   No.    Have you been diagnosed with or been told you have cirrhosis of the liver?   No.    Are you currently on dialysis?   No    Do you need assistance transferring?   No    BMI: Estimated body mass index is 29.33 kg/m  as calculated from the following:    Height as of 5/28/25: 1.865 m (6' 1.43\").    Weight as of 5/28/25: 102 kg (224 lb 14.4 oz).     Is patients BMI > 40 and scheduling location UP?  No    Do you take an injectable or oral medication for weight loss or diabetes (excluding insulin)?  No    Do you take the medication Naltrexone?  No    Do you take blood thinners?  No       Prep   Are you currently on dialysis or do you have chronic kidney disease?  No    Do you have a diagnosis of diabetes?  No    Do you have a diagnosis of cystic fibrosis (CF)?  No    On a regular basis do you go 3 -5 days between bowel movements?  No    BMI > 40?  No    Preferred Pharmacy:    [address] [address]  Phone: [phone] Fax: [phone]          Final Scheduling Details     Procedure scheduled  Colonoscopy    Surgeon:  Carlos     Date of procedure:  8/27     Pre-OP / PAC:   No - Not required for this site.    Location  [facility] - Patient preference.    Sedation   MAC/Deep Sedation - Per order.      Patient Reminders:   You will receive a call from a Nurse to review instructions and health history.  This assessment must be completed prior to your procedure.  Failure to complete the Nurse assessment may result in the procedure being cancelled.      On the day of your procedure, please designate an adult(s) who can drive you home stay with you for the next 24 hours. The medicines used in the exam will make you sleepy. You will not be able to drive.      You cannot take public transportation, ride share services, or non-medical taxi service without a responsible caregiver.  Medical transport services are allowed with the requirement that a " responsible caregiver will receive you at your destination.  We require that drivers and caregivers are confirmed prior to your procedure.

## 2025-07-15 ENCOUNTER — MYC MEDICAL ADVICE (OUTPATIENT)
Dept: GASTROENTEROLOGY | Facility: CLINIC | Age: 50
End: 2025-07-15
Payer: COMMERCIAL

## 2025-07-15 ENCOUNTER — LAB (OUTPATIENT)
Dept: LAB | Facility: CLINIC | Age: 50
End: 2025-07-15
Payer: COMMERCIAL

## 2025-07-15 DIAGNOSIS — K50.80 CROHN'S DISEASE OF BOTH SMALL AND LARGE INTESTINE WITHOUT COMPLICATION (H): ICD-10-CM

## 2025-07-15 DIAGNOSIS — K50.80 CROHN'S DISEASE OF BOTH SMALL AND LARGE INTESTINE WITHOUT COMPLICATION (H): Primary | ICD-10-CM

## 2025-07-15 LAB
ALBUMIN SERPL BCG-MCNC: 4.5 G/DL (ref 3.5–5.2)
ALP SERPL-CCNC: 83 U/L (ref 40–150)
ALT SERPL W P-5'-P-CCNC: 39 U/L (ref 0–70)
ANION GAP SERPL CALCULATED.3IONS-SCNC: 12 MMOL/L (ref 7–15)
AST SERPL W P-5'-P-CCNC: 37 U/L (ref 0–45)
BILIRUB SERPL-MCNC: 0.6 MG/DL
BUN SERPL-MCNC: 9.5 MG/DL (ref 6–20)
CALCIUM SERPL-MCNC: 8.9 MG/DL (ref 8.8–10.4)
CHLORIDE SERPL-SCNC: 103 MMOL/L (ref 98–107)
CREAT SERPL-MCNC: 0.89 MG/DL (ref 0.67–1.17)
EGFRCR SERPLBLD CKD-EPI 2021: >90 ML/MIN/1.73M2
ERYTHROCYTE [DISTWIDTH] IN BLOOD BY AUTOMATED COUNT: 12.8 % (ref 10–15)
GLUCOSE SERPL-MCNC: 177 MG/DL (ref 70–99)
HCO3 SERPL-SCNC: 24 MMOL/L (ref 22–29)
HCT VFR BLD AUTO: 44.9 % (ref 40–53)
HGB BLD-MCNC: 15.6 G/DL (ref 13.3–17.7)
MCH RBC QN AUTO: 31.8 PG (ref 26.5–33)
MCHC RBC AUTO-ENTMCNC: 34.7 G/DL (ref 31.5–36.5)
MCV RBC AUTO: 91 FL (ref 78–100)
PLATELET # BLD AUTO: 259 10E3/UL (ref 150–450)
POTASSIUM SERPL-SCNC: 4.5 MMOL/L (ref 3.4–5.3)
PROT SERPL-MCNC: 7.5 G/DL (ref 6.4–8.3)
RBC # BLD AUTO: 4.91 10E6/UL (ref 4.4–5.9)
SODIUM SERPL-SCNC: 139 MMOL/L (ref 135–145)
WBC # BLD AUTO: 7.9 10E3/UL (ref 4–11)

## 2025-07-15 PROCEDURE — 36415 COLL VENOUS BLD VENIPUNCTURE: CPT

## 2025-07-15 PROCEDURE — 80053 COMPREHEN METABOLIC PANEL: CPT

## 2025-07-15 PROCEDURE — 85027 COMPLETE CBC AUTOMATED: CPT

## 2025-07-16 DIAGNOSIS — R10.84 ABDOMINAL PAIN, GENERALIZED: ICD-10-CM

## 2025-07-16 DIAGNOSIS — K50.80 CROHN'S DISEASE OF BOTH SMALL AND LARGE INTESTINE WITHOUT COMPLICATION (H): Primary | ICD-10-CM

## 2025-07-17 ENCOUNTER — LAB (OUTPATIENT)
Dept: LAB | Facility: CLINIC | Age: 50
End: 2025-07-17
Payer: COMMERCIAL

## 2025-07-17 ENCOUNTER — TELEPHONE (OUTPATIENT)
Dept: GASTROENTEROLOGY | Facility: CLINIC | Age: 50
End: 2025-07-17

## 2025-07-17 DIAGNOSIS — R10.84 ABDOMINAL PAIN, GENERALIZED: ICD-10-CM

## 2025-07-17 DIAGNOSIS — K50.80 CROHN'S DISEASE OF BOTH SMALL AND LARGE INTESTINE WITHOUT COMPLICATION (H): ICD-10-CM

## 2025-07-17 NOTE — TELEPHONE ENCOUNTER
LPN spoke with pt I let him know unfortunately we received a message from the lab that they were unable to run the enteric panel - Pt was advised to schedule an appointment and resubmit a new sample. Pt verbalized understanding.

## 2025-07-21 ENCOUNTER — TELEPHONE (OUTPATIENT)
Dept: GASTROENTEROLOGY | Facility: CLINIC | Age: 50
End: 2025-07-21
Payer: COMMERCIAL

## 2025-07-21 NOTE — TELEPHONE ENCOUNTER
Pre assessment completed for upcoming procedure.   (Please see previous telephone encounter notes for complete details)    Procedure details:    Procedure date 7/29/25, arrival time 1245 and facility location reviewed.    Pre op exam needed? No.    Designated  policy reviewed. Instructed to have someone stay 24  hours post procedure.     Medication review:    Medications reviewed. Please see supporting documentation below. Holding recommendations discussed (if applicable).     Prep for procedure:     Procedure prep instructions reviewed.      Any additional information needed:  N/A    Patient verbalized understanding and had no questions or concerns at this time.      Daisy Sim LPN  Endoscopy Procedure Pre Assessment   [phone] option 3

## 2025-07-21 NOTE — TELEPHONE ENCOUNTER
Pre visit planning completed.      Procedure details:    Patient scheduled for Colonoscopy on 7/29/25. ADD ON    Arrival time: 1245. Procedure time 1345    Facility location: [facility]; [address] [place] [address], [address], [place] [address]. Check in location: 5th Floor.    Sedation type: MAC    Pre op exam needed? No.    Indication for procedure: crohns      Chart review:     Electronic implanted devices? No    Recent diagnosis of diverticulitis within the last 6 weeks? No      Medication review:    Diabetic? No    Anticoagulants? No    Weight loss medication/injectable? No GLP-1 medication per patient's medication list. Nursing to verify with pre-assessment call.    Other medication HOLDING recommendations:  N/A      Prep for procedure:     Bowel prep recommendation: Standard Miralax.   Due to: standard bowel prep    Procedure information and instructions sent via [employer]         Ashleigh West RN  Endoscopy Procedure Pre Assessment   [phone] option 3

## 2025-07-21 NOTE — TELEPHONE ENCOUNTER
Reason for Reschedule/Cancellation (please be detailed, any staff messages or encounters to note?):   PER DR. DELGADO - PT SHOULD BE SCHEDULED SOONER DUE TO CURRENT SYMPTOMS    Did you cancel or rescheduled an EUS procedure? No.    Is screening questionnaire older than 3 months from the reschedule date.   If Yes, please complete screening questionnaire. No    Prior to reschedule please review:  Ordering Provider: DANNY  Sedation Determined: MAC  Does patient have any ASC Exclusions, please identify?: NO    Notes on Cancelled Procedure:  Procedure: Lower Endoscopy [Colonoscopy]   Date: 08/27/2025  Location: [facility]; [address], [place] [address]  Surgeon: KATY    Rescheduled: Yes,   Procedure: Lower Endoscopy [Colonoscopy]    Date: 07/29/2025   Location: [facility]; [address], [address], [place] [address]   Surgeon: JOAN   Sedation Level Scheduled  MAC ,  Reason for Sedation Level PER ORDER   Instructions updated and sent: VIA [employer]     Does patient need PAC or Pre -Op Rescheduled? : NO

## 2025-07-28 ENCOUNTER — ANESTHESIA EVENT (OUTPATIENT)
Dept: SURGERY | Facility: AMBULATORY SURGERY CENTER | Age: 50
End: 2025-07-28
Payer: COMMERCIAL

## 2025-07-29 ENCOUNTER — HOSPITAL ENCOUNTER (OUTPATIENT)
Facility: AMBULATORY SURGERY CENTER | Age: 50
Discharge: HOME | End: 2025-07-29
Attending: INTERNAL MEDICINE
Payer: COMMERCIAL

## 2025-07-29 ENCOUNTER — ANESTHESIA (OUTPATIENT)
Dept: SURGERY | Facility: AMBULATORY SURGERY CENTER | Age: 50
End: 2025-07-29
Payer: COMMERCIAL

## 2025-07-29 VITALS
RESPIRATION RATE: 15 BRPM | DIASTOLIC BLOOD PRESSURE: 74 MMHG | SYSTOLIC BLOOD PRESSURE: 110 MMHG | OXYGEN SATURATION: 96 % | TEMPERATURE: 97.3 F | BODY MASS INDEX: 29.16 KG/M2 | HEART RATE: 59 BPM | HEIGHT: 73 IN | WEIGHT: 220 LBS

## 2025-07-29 VITALS — HEART RATE: 72 BPM

## 2025-07-29 LAB — COLONOSCOPY: NORMAL

## 2025-07-29 PROCEDURE — 88305 TISSUE EXAM BY PATHOLOGIST: CPT | Mod: TC | Performed by: INTERNAL MEDICINE

## 2025-07-29 PROCEDURE — 88305 TISSUE EXAM BY PATHOLOGIST: CPT | Mod: 26 | Performed by: PATHOLOGY

## 2025-07-29 RX ORDER — PROPOFOL 10 MG/ML
INJECTION, EMULSION INTRAVENOUS PRN
Status: DISCONTINUED | OUTPATIENT
Start: 2025-07-29 | End: 2025-07-29

## 2025-07-29 RX ORDER — ONDANSETRON 2 MG/ML
4 INJECTION INTRAMUSCULAR; INTRAVENOUS EVERY 6 HOURS PRN
Status: DISCONTINUED | OUTPATIENT
Start: 2025-07-29 | End: 2025-07-30 | Stop reason: HOSPADM

## 2025-07-29 RX ORDER — ONDANSETRON 4 MG/1
4 TABLET, ORALLY DISINTEGRATING ORAL EVERY 6 HOURS PRN
Status: DISCONTINUED | OUTPATIENT
Start: 2025-07-29 | End: 2025-07-30 | Stop reason: HOSPADM

## 2025-07-29 RX ORDER — PROCHLORPERAZINE MALEATE 10 MG
10 TABLET ORAL EVERY 6 HOURS PRN
Status: DISCONTINUED | OUTPATIENT
Start: 2025-07-29 | End: 2025-07-30 | Stop reason: HOSPADM

## 2025-07-29 RX ORDER — NALOXONE HYDROCHLORIDE 0.4 MG/ML
0.4 INJECTION, SOLUTION INTRAMUSCULAR; INTRAVENOUS; SUBCUTANEOUS
Status: DISCONTINUED | OUTPATIENT
Start: 2025-07-29 | End: 2025-07-30 | Stop reason: HOSPADM

## 2025-07-29 RX ORDER — PROPOFOL 10 MG/ML
INJECTION, EMULSION INTRAVENOUS CONTINUOUS PRN
Status: DISCONTINUED | OUTPATIENT
Start: 2025-07-29 | End: 2025-07-29

## 2025-07-29 RX ORDER — NALOXONE HYDROCHLORIDE 0.4 MG/ML
0.2 INJECTION, SOLUTION INTRAMUSCULAR; INTRAVENOUS; SUBCUTANEOUS
Status: DISCONTINUED | OUTPATIENT
Start: 2025-07-29 | End: 2025-07-30 | Stop reason: HOSPADM

## 2025-07-29 RX ORDER — LIDOCAINE HYDROCHLORIDE 20 MG/ML
INJECTION, SOLUTION INFILTRATION; PERINEURAL PRN
Status: DISCONTINUED | OUTPATIENT
Start: 2025-07-29 | End: 2025-07-29

## 2025-07-29 RX ORDER — SODIUM CHLORIDE, SODIUM LACTATE, POTASSIUM CHLORIDE, CALCIUM CHLORIDE 600; 310; 30; 20 MG/100ML; MG/100ML; MG/100ML; MG/100ML
INJECTION, SOLUTION INTRAVENOUS CONTINUOUS
Status: DISCONTINUED | OUTPATIENT
Start: 2025-07-29 | End: 2025-07-30 | Stop reason: HOSPADM

## 2025-07-29 RX ORDER — FLUMAZENIL 0.1 MG/ML
0.2 INJECTION, SOLUTION INTRAVENOUS
Status: DISCONTINUED | OUTPATIENT
Start: 2025-07-29 | End: 2025-07-30 | Stop reason: HOSPADM

## 2025-07-29 RX ORDER — ONDANSETRON 2 MG/ML
INJECTION INTRAMUSCULAR; INTRAVENOUS PRN
Status: DISCONTINUED | OUTPATIENT
Start: 2025-07-29 | End: 2025-07-29

## 2025-07-29 RX ORDER — LIDOCAINE 40 MG/G
CREAM TOPICAL
Status: DISCONTINUED | OUTPATIENT
Start: 2025-07-29 | End: 2025-07-30 | Stop reason: HOSPADM

## 2025-07-29 RX ORDER — ONDANSETRON 2 MG/ML
4 INJECTION INTRAMUSCULAR; INTRAVENOUS
Status: DISCONTINUED | OUTPATIENT
Start: 2025-07-29 | End: 2025-07-30 | Stop reason: HOSPADM

## 2025-07-29 RX ADMIN — ONDANSETRON 4 MG: 2 INJECTION INTRAMUSCULAR; INTRAVENOUS at 15:06

## 2025-07-29 RX ADMIN — SODIUM CHLORIDE, SODIUM LACTATE, POTASSIUM CHLORIDE, CALCIUM CHLORIDE: 600; 310; 30; 20 INJECTION, SOLUTION INTRAVENOUS at 13:17

## 2025-07-29 RX ADMIN — LIDOCAINE HYDROCHLORIDE 50 MG: 20 INJECTION, SOLUTION INFILTRATION; PERINEURAL at 15:07

## 2025-07-29 RX ADMIN — PROPOFOL 100 MG: 10 INJECTION, EMULSION INTRAVENOUS at 15:08

## 2025-07-29 RX ADMIN — PROPOFOL 200 MCG/KG/MIN: 10 INJECTION, EMULSION INTRAVENOUS at 15:08

## 2025-07-29 ASSESSMENT — LIFESTYLE VARIABLES: TOBACCO_USE: 0

## 2025-07-29 ASSESSMENT — COPD QUESTIONNAIRES: COPD: 0

## 2025-07-29 ASSESSMENT — ENCOUNTER SYMPTOMS: ORTHOPNEA: 0

## 2025-07-29 NOTE — ANESTHESIA POSTPROCEDURE EVALUATION
Patient: Monty Fleming    Procedure: Procedure(s):  COLONOSCOPY, WITH BIOPSIES       Anesthesia Type:  MAC    Note:  Disposition: Outpatient   Postop Pain Control: Uneventful            Sign Out: Well controlled pain   PONV: No   Neuro/Psych: Uneventful            Sign Out: Acceptable/Baseline neuro status   Airway/Respiratory: Uneventful            Sign Out: Acceptable/Baseline resp. status   CV/Hemodynamics: Uneventful            Sign Out: Acceptable CV status; No obvious hypovolemia; No obvious fluid overload   Other NRE:    DID A NON-ROUTINE EVENT OCCUR?            Last vitals:  Vitals Value Taken Time   /74 07/29/25 16:30   Temp 36.3  C (97.3  F) 07/29/25 16:30   Pulse 66 07/29/25 16:30   Resp 15 07/29/25 16:30   SpO2 97 % 07/29/25 16:31   Vitals shown include unfiled device data.    Electronically Signed By: Lorrie Navarro MD  July 29, 2025  5:59 PM

## 2025-07-29 NOTE — ANESTHESIA PREPROCEDURE EVALUATION
Anesthesia Pre-Procedure Evaluation    Patient: Monty Fleming   MRN: [id number] : 1975          Procedure : Procedure(s):  Colonoscopy         Past Medical History:   Diagnosis Date    Crohn's disease (H) 2018    Dysplastic nevus     ED (erectile dysfunction)     Fatty liver     Gastroesophageal reflux disease with esophagitis     Migraine     Plantar fasciitis     PONV (postoperative nausea and vomiting)     PVC's (premature ventricular contractions)     Seasonal allergic rhinitis     Sleep apnea       Past Surgical History:   Procedure Laterality Date    COLONOSCOPY N/A 2022    Procedure: COLONOSCOPY, WITH POLYPECTOMY AND BIOPSY;  Surgeon: Ariel Drew MD;  Location:  GI    ESOPHAGOSCOPY, GASTROSCOPY, DUODENOSCOPY (EGD), COMBINED N/A 2022    Procedure: ESOPHAGOGASTRODUODENOSCOPY, WITH BIOPSY;  Surgeon: Ariel Drew MD;  Location: [facility]    IR LUMBAR PUNCTURE  2024    NISSEN FUNDOPLICATION N/A 2016    x 2      No Known Allergies   Social History     Tobacco Use    Smoking status: Never    Smokeless tobacco: Never   Substance Use Topics    Alcohol use: Never      Wt Readings from Last 1 Encounters:   25 99.8 kg (220 lb)        Anesthesia Evaluation   Pt has had prior anesthetic. Type: General and MAC.    History of anesthetic complications  - PONV.      ROS/MED HX  ENT/Pulmonary:     (+) sleep apnea, uses CPAP,                                   (-) tobacco use, asthma, COPD and recent URI   Neurologic: Comment: Gliosis    (+)    peripheral neuropathy, - Left anterior/lateral thigh has area of decreased sensation.                           Cardiovascular:    (-) AGUIAR, orthopnea/PND and syncope   METS/Exercise Tolerance: 4 - Raking leaves, gardening    Hematologic:       Musculoskeletal:       GI/Hepatic:     (+)       Inflammatory bowel disease,          (-) GERD   Renal/Genitourinary:       Endo:       Psychiatric/Substance Use:       Infectious Disease:      "  Malignancy:       Other:              Physical Exam  Airway  Mallampati: II  TM distance: >3 FB  Neck ROM: full  Mouth opening: >= 4 cm    Cardiovascular   Rhythm: regular  Rate: normal rate     Dental       Pulmonary Breath sounds clear to auscultation        Neurological   He appears awake, alert and oriented x3.    Other Findings       OUTSIDE LABS:  CBC:   Lab Results   Component Value Date    WBC 7.9 07/15/2025    WBC 7.2 04/29/2025    HGB 15.6 07/15/2025    HGB 16.1 04/29/2025    HCT 44.9 07/15/2025    HCT 47.5 04/29/2025     07/15/2025     04/29/2025     BMP:   Lab Results   Component Value Date     07/15/2025     04/29/2025    POTASSIUM 4.5 07/15/2025    POTASSIUM 4.2 04/29/2025    CHLORIDE 103 07/15/2025    CHLORIDE 103 04/29/2025    CO2 24 07/15/2025    CO2 27 04/29/2025    BUN 9.5 07/15/2025    BUN 10.2 04/29/2025    CR 0.89 07/15/2025    CR 0.78 04/29/2025     (H) 07/15/2025    GLC 83 04/29/2025     COAGS:   Lab Results   Component Value Date    INR 0.9 02/21/2020     POC: No results found for: \"BGM\", \"HCG\", \"HCGS\"  HEPATIC:   Lab Results   Component Value Date    ALBUMIN 4.5 07/15/2025    PROTTOTAL 7.5 07/15/2025    ALT 39 07/15/2025    AST 37 07/15/2025    ALKPHOS 83 07/15/2025    BILITOTAL 0.6 07/15/2025     OTHER:   Lab Results   Component Value Date    A1C 5.6 05/04/2023    MISTY 8.9 07/15/2025    TSH 2.38 12/27/2023    CRP <2.9 04/22/2021    SED 2 12/27/2023       Anesthesia Plan    ASA Status:  2      NPO Status: NPO Appropriate   Anesthesia Type: MAC.   Techniques and Equipment:       - Monitoring Plan: standard ASA monitoring     Consents    Anesthesia Plan(s) and associated risks, benefits, and realistic alternatives discussed. Questions answered and patient/representative(s) expressed understanding.     - Discussed: anesthesiologist     - Discussed with:  Patient               Postoperative Care         Comments:    Other Comments: Discussed plan for MAC, " "including possibility of awareness, risk of aspiration pneumonia, risk of hypoxia/low oxygen/airway obstruction requiring additional airway support/devices. Discussed alternatives. Discussed backup plan of general anesthesia and intubation. Ensured understanding, invited questions and all questions were answered.               Lorrie Navarro MD    I have reviewed the pertinent notes and labs in the chart from the past 30 days and (re)examined the patient.  Any updates or changes from those notes are reflected in this note.    Clinically Significant Risk Factors Present on Admission                             # Overweight: Estimated body mass index is 29.03 kg/m  as calculated from the following:    Height as of this encounter: 1.854 m (6' 1\").    Weight as of this encounter: 99.8 kg (220 lb).                    "

## 2025-07-29 NOTE — ANESTHESIA CARE TRANSFER NOTE
Patient: Monty Fleming    Procedure: Procedure(s):  COLONOSCOPY, WITH BIOPSIES       Diagnosis: Crohn's disease of both small and large intestine without complication (H) [K50.80]  Diagnosis Additional Information: No value filed.    Anesthesia Type:   MAC     Note:    Oropharynx: oropharynx clear of all foreign objects and spontaneously breathing  Level of Consciousness: awake  Oxygen Supplementation: room air  Level of Supplemental Oxygen (L/min / FiO2): 0  Independent Airway: airway patency satisfactory and stable  Dentition: dentition unchanged  Vital Signs Stable: post-procedure vital signs reviewed and stable  Report to RN Given: handoff report given  Patient transferred to: Phase II  Comments: Uneventful transport   Report to RN Odette Yeager  Exchanging well; color natl  Pt responds appropriately to command  IV patent  Lips/teeth/dentition as preop status  Questions answered      Handoff Report: Identifed the Patient, Identified the Reponsible Provider, Reviewed the pertinent medical history, Discussed the surgical course, Reviewed Intra-OP anesthesia mangement and issues during anesthesia, Set expectations for post-procedure period and Allowed opportunity for questions and acknowledgement of understanding      Vitals:  Vitals Value Taken Time   /83 07/29/25 15:46   Temp 36  C (96.8  F) 07/29/25 15:46   Pulse 76 07/29/25 15:46   Resp 14 07/29/25 15:46   SpO2 96 % 07/29/25 15:48   Vitals shown include unfiled device data.    Electronically Signed By: MELITA GARCIA CRNA  July 29, 2025  3:49 PM

## 2025-08-09 ENCOUNTER — HEALTH MAINTENANCE LETTER (OUTPATIENT)
Age: 50
End: 2025-08-09

## 2025-08-12 ENCOUNTER — ANCILLARY PROCEDURE (OUTPATIENT)
Dept: GENERAL RADIOLOGY | Facility: CLINIC | Age: 50
End: 2025-08-12
Attending: STUDENT IN AN ORGANIZED HEALTH CARE EDUCATION/TRAINING PROGRAM
Payer: COMMERCIAL

## 2025-08-12 ENCOUNTER — OFFICE VISIT (OUTPATIENT)
Dept: GASTROENTEROLOGY | Facility: CLINIC | Age: 50
End: 2025-08-12
Payer: COMMERCIAL

## 2025-08-12 ENCOUNTER — OFFICE VISIT (OUTPATIENT)
Dept: RHEUMATOLOGY | Facility: CLINIC | Age: 50
End: 2025-08-12
Attending: INTERNAL MEDICINE
Payer: COMMERCIAL

## 2025-08-12 VITALS
DIASTOLIC BLOOD PRESSURE: 81 MMHG | WEIGHT: 232 LBS | OXYGEN SATURATION: 97 % | SYSTOLIC BLOOD PRESSURE: 123 MMHG | BODY MASS INDEX: 30.61 KG/M2 | HEART RATE: 94 BPM

## 2025-08-12 VITALS
WEIGHT: 233.6 LBS | HEART RATE: 98 BPM | SYSTOLIC BLOOD PRESSURE: 136 MMHG | OXYGEN SATURATION: 96 % | DIASTOLIC BLOOD PRESSURE: 84 MMHG | BODY MASS INDEX: 30.96 KG/M2 | HEIGHT: 73 IN

## 2025-08-12 DIAGNOSIS — M54.89 INFLAMMATORY BACK PAIN: ICD-10-CM

## 2025-08-12 DIAGNOSIS — M54.89 INFLAMMATORY BACK PAIN: Primary | ICD-10-CM

## 2025-08-12 DIAGNOSIS — K50.80 CROHN'S DISEASE OF BOTH SMALL AND LARGE INTESTINE WITHOUT COMPLICATION (H): Primary | ICD-10-CM

## 2025-08-12 DIAGNOSIS — M25.551 BILATERAL HIP PAIN: ICD-10-CM

## 2025-08-12 DIAGNOSIS — M25.552 BILATERAL HIP PAIN: ICD-10-CM

## 2025-08-12 DIAGNOSIS — R10.84 ABDOMINAL PAIN, GENERALIZED: ICD-10-CM

## 2025-08-12 DIAGNOSIS — K21.9 GASTROESOPHAGEAL REFLUX DISEASE, UNSPECIFIED WHETHER ESOPHAGITIS PRESENT: ICD-10-CM

## 2025-08-12 DIAGNOSIS — K59.00 CONSTIPATION, UNSPECIFIED CONSTIPATION TYPE: ICD-10-CM

## 2025-08-12 PROBLEM — D12.6 TUBULAR ADENOMA OF COLON: Status: ACTIVE | Noted: 2025-08-12

## 2025-08-12 PROCEDURE — 73522 X-RAY EXAM HIPS BI 3-4 VIEWS: CPT | Performed by: RADIOLOGY

## 2025-08-12 PROCEDURE — 72100 X-RAY EXAM L-S SPINE 2/3 VWS: CPT | Performed by: STUDENT IN AN ORGANIZED HEALTH CARE EDUCATION/TRAINING PROGRAM

## 2025-08-12 PROCEDURE — 99214 OFFICE O/P EST MOD 30 MIN: CPT | Performed by: PHYSICIAN ASSISTANT

## 2025-08-12 RX ORDER — LUBIPROSTONE 0.01 MG/1
8 CAPSULE ORAL 2 TIMES DAILY
Qty: 180 CAPSULE | Refills: 0 | Status: SHIPPED | OUTPATIENT
Start: 2025-08-12

## 2025-08-12 ASSESSMENT — PAIN SCALES - GENERAL
PAINLEVEL_OUTOF10: MILD PAIN (3)
PAINLEVEL_OUTOF10: MILD PAIN (3)

## 2025-08-13 ENCOUNTER — PATIENT OUTREACH (OUTPATIENT)
Dept: CARE COORDINATION | Facility: CLINIC | Age: 50
End: 2025-08-13
Payer: COMMERCIAL

## 2025-08-13 ENCOUNTER — RESULTS FOLLOW-UP (OUTPATIENT)
Dept: RHEUMATOLOGY | Facility: CLINIC | Age: 50
End: 2025-08-13
Payer: COMMERCIAL

## 2025-08-13 DIAGNOSIS — M54.89 INFLAMMATORY BACK PAIN: Primary | ICD-10-CM

## (undated) DEVICE — SPECIMEN CONTAINER 3OZ W/FORMALIN 59901

## (undated) DEVICE — SOL WATER IRRIG 500ML BOTTLE 2F7113

## (undated) DEVICE — GOWN IMPERVIOUS 2XL BLUE

## (undated) DEVICE — ENDO FORCEP BX CAPTURA PRO SPIKE G50696

## (undated) DEVICE — TUBING SUCTION 10'X3/16" N510

## (undated) DEVICE — GOWN ISOLATION YELLOW UNIV NOT STERILE 3110PG

## (undated) DEVICE — PAD CHUX UNDERPAD 30X36" P3036C

## (undated) DEVICE — KIT ENDO TURNOVER/PROCEDURE CARRY-ON 101822

## (undated) DEVICE — SUCTION MANIFOLD NEPTUNE 2 SYS 1 PORT 702-025-000